# Patient Record
Sex: MALE | Race: WHITE | Employment: FULL TIME | URBAN - NONMETROPOLITAN AREA
[De-identification: names, ages, dates, MRNs, and addresses within clinical notes are randomized per-mention and may not be internally consistent; named-entity substitution may affect disease eponyms.]

---

## 2017-03-08 ENCOUNTER — COMMUNICATION - GICH (OUTPATIENT)
Dept: INTERNAL MEDICINE | Facility: OTHER | Age: 57
End: 2017-03-08

## 2017-03-08 DIAGNOSIS — I10 ESSENTIAL (PRIMARY) HYPERTENSION: ICD-10-CM

## 2017-03-10 ENCOUNTER — HISTORY (OUTPATIENT)
Dept: INTERNAL MEDICINE | Facility: OTHER | Age: 57
End: 2017-03-10

## 2017-03-10 ENCOUNTER — OFFICE VISIT - GICH (OUTPATIENT)
Dept: INTERNAL MEDICINE | Facility: OTHER | Age: 57
End: 2017-03-10

## 2017-03-10 DIAGNOSIS — N52.9 MALE ERECTILE DYSFUNCTION: ICD-10-CM

## 2017-03-10 DIAGNOSIS — I10 ESSENTIAL (PRIMARY) HYPERTENSION: ICD-10-CM

## 2017-03-10 DIAGNOSIS — M06.9 RHEUMATOID ARTHRITIS (H): ICD-10-CM

## 2017-03-10 LAB
A/G RATIO - HISTORICAL: 1.1 (ref 1–2)
ABSOLUTE BASOPHILS - HISTORICAL: 0.1 THOU/CU MM
ABSOLUTE EOSINOPHILS - HISTORICAL: 0.1 THOU/CU MM
ABSOLUTE LYMPHOCYTES - HISTORICAL: 1.5 THOU/CU MM (ref 0.9–2.9)
ABSOLUTE MONOCYTES - HISTORICAL: 0.7 THOU/CU MM
ABSOLUTE NEUTROPHILS - HISTORICAL: 5.1 THOU/CU MM (ref 1.7–7)
ALBUMIN SERPL-MCNC: 4 G/DL (ref 3.5–5.7)
ALP SERPL-CCNC: 70 IU/L (ref 34–104)
ALT (SGPT) - HISTORICAL: 14 IU/L (ref 7–52)
ANION GAP - HISTORICAL: 8 (ref 5–18)
AST SERPL-CCNC: 17 IU/L (ref 13–39)
BASOPHILS # BLD AUTO: 0.7 %
BILIRUB SERPL-MCNC: 0.8 MG/DL (ref 0.3–1)
BUN SERPL-MCNC: 11 MG/DL (ref 7–25)
BUN/CREAT RATIO - HISTORICAL: 10
CALCIUM SERPL-MCNC: 9.4 MG/DL (ref 8.6–10.3)
CHLORIDE SERPLBLD-SCNC: 98 MMOL/L (ref 98–107)
CHOL/HDL RATIO - HISTORICAL: 3.95
CHOLESTEROL TOTAL: 158 MG/DL
CO2 SERPL-SCNC: 23 MMOL/L (ref 21–31)
CREAT SERPL-MCNC: 1.15 MG/DL (ref 0.7–1.3)
EOSINOPHIL NFR BLD AUTO: 1.7 %
ERYTHROCYTE [DISTWIDTH] IN BLOOD BY AUTOMATED COUNT: 11.6 % (ref 11.5–15.5)
GFR IF NOT AFRICAN AMERICAN - HISTORICAL: >60 ML/MIN/1.73M2
GLOBULIN - HISTORICAL: 3.6 G/DL (ref 2–3.7)
GLUCOSE SERPL-MCNC: 150 MG/DL (ref 70–105)
HCT VFR BLD AUTO: 43.7 % (ref 37–53)
HDLC SERPL-MCNC: 40 MG/DL (ref 23–92)
HEMOGLOBIN: 15.2 G/DL (ref 13.5–17.5)
LDLC SERPL CALC-MCNC: 87 MG/DL
LYMPHOCYTES NFR BLD AUTO: 20.5 % (ref 20–44)
MCH RBC QN AUTO: 32.6 PG (ref 26–34)
MCHC RBC AUTO-ENTMCNC: 34.8 G/DL (ref 32–36)
MCV RBC AUTO: 94 FL (ref 80–100)
MONOCYTES NFR BLD AUTO: 8.8 %
NEUTROPHILS NFR BLD AUTO: 68.4 % (ref 42–72)
NON-HDL CHOLESTEROL - HISTORICAL: 118 MG/DL
PATIENT STATUS - HISTORICAL: ABNORMAL
PLATELET # BLD AUTO: 284 THOU/CU MM (ref 140–440)
PMV BLD: 6.8 FL (ref 6.5–11)
POTASSIUM SERPL-SCNC: 3.7 MMOL/L (ref 3.5–5.1)
PROT SERPL-MCNC: 7.6 G/DL (ref 6.4–8.9)
PSA TOTAL (DIAGNOSTIC) - HISTORICAL: 3.9 NG/ML
RED BLOOD COUNT - HISTORICAL: 4.67 MIL/CU MM (ref 4.3–5.9)
SODIUM SERPL-SCNC: 129 MMOL/L (ref 133–143)
TRIGL SERPL-MCNC: 155 MG/DL
WHITE BLOOD COUNT - HISTORICAL: 7.5 THOU/CU MM (ref 4.5–11)

## 2017-05-24 ENCOUNTER — AMBULATORY - GICH (OUTPATIENT)
Dept: SCHEDULING | Facility: OTHER | Age: 57
End: 2017-05-24

## 2017-06-09 ENCOUNTER — COMMUNICATION - GICH (OUTPATIENT)
Dept: INTERNAL MEDICINE | Facility: OTHER | Age: 57
End: 2017-06-09

## 2017-06-09 DIAGNOSIS — M06.9 RHEUMATOID ARTHRITIS (H): ICD-10-CM

## 2017-09-14 ENCOUNTER — COMMUNICATION - GICH (OUTPATIENT)
Dept: INTERNAL MEDICINE | Facility: OTHER | Age: 57
End: 2017-09-14

## 2017-09-14 DIAGNOSIS — M06.9 RHEUMATOID ARTHRITIS (H): ICD-10-CM

## 2017-09-14 DIAGNOSIS — K21.9 GASTRO-ESOPHAGEAL REFLUX DISEASE WITHOUT ESOPHAGITIS: ICD-10-CM

## 2017-12-13 ENCOUNTER — COMMUNICATION - GICH (OUTPATIENT)
Dept: INTERNAL MEDICINE | Facility: OTHER | Age: 57
End: 2017-12-13

## 2017-12-13 DIAGNOSIS — M06.9 RHEUMATOID ARTHRITIS (H): ICD-10-CM

## 2017-12-28 NOTE — TELEPHONE ENCOUNTER
Patient Information     Patient Name MRN Wicho Briones [id number] Male 1960      Telephone Encounter by Cheyenne Verdin RN at 2017  8:18 AM     Author:  Cheyenne Verdin RN Service:  (none) Author Type:  NURS- Registered Nurse     Filed:  2017  8:19 AM Encounter Date:  2017 Status:  Signed     :  Cheyenne Verdin RN (NURS- Registered Nurse)            Refilled 17.  Unable to complete prescription refill per RN Medication Refill Policy.................... CHEYENNE VERDIN RN ....................  2017   8:19 AM

## 2017-12-28 NOTE — TELEPHONE ENCOUNTER
Patient Information     Patient Name MRN Sex Wicho Gomez [id number] Male 1960      Telephone Encounter by Tamia Luong RN at 2017  1:25 PM     Author:  Tamia Luong RN Service:  (none) Author Type:  NURS- Registered Nurse     Filed:  2017  1:33 PM Encounter Date:  2017 Status:  Signed     :  Tamia Luong RN (NURS- Registered Nurse)            [facility] and pt request refill Rxs for methotrexate (Rheumatrex) and omeprazole (Prilsosec).      Unable to complete prescription refill for methotrexate per RN Medication Refill Policy. This RN will forward Rx refill request to PCP.  Tamia Luong RN  2017   1:30 PM.    Proton Pump Inhibitors    Office visit in the past 12 months or per provider note.    Last visit with LINDA IGLESIAS was on: 03/10/2017 in [facility]  Next visit with LINDA IGLESIAS is on: No future appointment listed with this provider  Next visit with Internal Medicine is on: No future appointment listed in this department    Max refill for 12 months from last office visit or per provider note.    Prescription refilled per RN Medication Refill Policy.................... Tamia Luong RN ....................  2017   1:31 PM

## 2018-01-03 NOTE — NURSING NOTE
Patient Information     Patient Name MRN Wicho Briones [id number] Male 1960      Nursing Note by Linda Norwood LPN at 3/10/2017 10:40 AM     Author:  Linda Norwood LPN Service:  (none) Author Type:  NURS- Licensed Practical Nurse     Filed:  3/10/2017 10:34 AM Encounter Date:  3/10/2017 Status:  Signed     :  Linda Norwood LPN (NURS- Licensed Practical Nurse)            The patient is here today to get his medications refilled.  Linda Norwood LPN......3/10/2017  10:30 AM

## 2018-01-03 NOTE — TELEPHONE ENCOUNTER
Patient Information     Patient Name MRN Sex Wicho Gomez [id number] Male 1960      Telephone Encounter by Tamia Bates RN at 3/9/2017  8:15 AM     Author:  Tamia Bates RN Service:  (none) Author Type:  NURS- Registered Nurse     Filed:  3/9/2017  8:23 AM Encounter Date:  3/8/2017 Status:  Signed     :  Tamia Bates RN (NURS- Registered Nurse)            Diuretic Combinations    Office visit in the past 12 months or per provider note.    Last visit with LINDA IGLESIAS was on: 2016 in [facility]  Next visit with LINDA IGLESIAS is on: 03/10/2017 in [facility]  Next visit with Internal Medicine is on: 03/10/2017 in [facility]    Lab test requirements:  Creatinine and Potassium annually, if ordering lab, order BMP.  CREATININE (mg/dL)    Date Value   2016 1.19     POTASSIUM (mmol/L)    Date Value   2016 3.9       Max refill for 12 months from last office visit or per provider note    Medication management appointment tomorrow 3/10/17 noted  Prescription refilled per RN Medication Refill Policy.................... Tamia Bates RN ....................  3/9/2017   8:15 AM

## 2018-01-03 NOTE — PROGRESS NOTES
Patient Information     Patient Name MRN Wicho Briones [id number] Male 1960      Progress Notes by Leonardo Gomes MD at 3/10/2017 10:40 AM     Author:  Leonardo Gomes MD Service:  (none) Author Type:  Physician     Filed:  3/10/2017 10:53 AM Encounter Date:  3/10/2017 Status:  Signed     :  Leonardo Gomes MD (Physician)            SUBJECTIVE:    Wicho Hawk is a 56 y.o. male who presents for comprehensive review of their multiple medical problems and review of medications, renewal of medications and update on necessary health maintenance issues.      HPI Comments: He comes in today for a checkup. He has rheumatoid arthritis. His methotrexate is at 7.5 mg weekly. He's having more problems of late with his arthritis including and especially in his right hand and fingers. We talked about various options for dealing with this. He takes some Aleve on occasion which helps as well.    The last time he had been in he was having significant troubles with dysphagia. He had an upper GI endoscopy that was negative and never did follow through with the presumed manometry that Dr. Garces was contemplating. I don't have any of those records. He is not having too much trouble with his swallowing at this time.    I spent time today reconciling his medications and reviewing his past medical history, past surgical history, family history and social history.      No Known Allergies,   Current Outpatient Prescriptions     Medication  Sig     folic acid 1 mg tablet TAKE ONE TABLET BY MOUTH EVERY DAY     lisinopril-hydrochlorothiazide, 20-25 mg, (PRINZIDE, ZESTORETIC) 20-25 mg per tablet TAKE ONE TABLET BY MOUTH EVERY DAY     methotrexate (RHEUMATREX) 2.5 mg tablet Take 3 tablets by mouth once weekly.     omeprazole (PRILOSEC) 20 mg Delayed-Release capsule Take 1 capsule by mouth once daily before a meal.     sildenafil citrate (VIAGRA) 100 mg tablet Take 1 tablet by mouth once daily if needed for Erectile  Dysfunction. Take 30min to 4 hours before sexual activity. Max 100mg/24hr.     No current facility-administered medications for this visit.      Medications have been reviewed by me and are current to the best of my knowledge and ability. ,   Past Medical History      Diagnosis   Date     ED (erectile dysfunction)  2014     GERD (gastroesophageal reflux disease)       H/O cardiovascular stress test       Cardiolite stress test with questionable septal anterior perfusion defect       Hyperlipidemia       Hypertension       RA (rheumatoid arthritis) (HC)     ,   Patient Active Problem List      Diagnosis Date Noted     ED (erectile dysfunction) 2014     Hyperlipidemia      Hypertension 2013     ARTHRITIS, RHEUMATOID 2011     GERD    ,   Past Surgical History       Procedure   Laterality Date     Vasectomy        Forearm/wrist surgery  Left      Wrist surgery with fusion.         Colonoscopy screening        Normal screening colonoscopy, follow up recommended in 10 years        Shoulder arthroscopy  Left 2011      Upper arm/elbow surgery        Right elbow surgery for bone spurs/chips       Knee arthroscopy  Left      Esophagogastroduodenoscopy   2015     [place]      and   Social History      Substance Use Topics        Smoking status:  Never Smoker     Smokeless tobacco:  Never Used     Alcohol use  3.0 oz/week     6 Standard drinks or equivalent per week      Family Status     Relation  Status     Father      Mother Alive     Brother Alive     Brother Alive     Sister Alive     Daughter     Leukemia      Daughter      Social History     Social History        Marital status:       Spouse name: N/A     Number of children:  N/A     Years of education:  N/A     Social History Main Topics        Smoking status:  Never Smoker     Smokeless tobacco:  Never Used     Alcohol use  3.0 oz/week     6 Standard drinks or equivalent per week      Drug use:  No     Sexual  activity:  Not Asked     Other Topics  Concern     None      Social History Narrative     He worked as a  for [employer].  , lives in [place].        Works at [employer].                                        REVIEW OF SYSTEMS:  Review of Systems   Constitutional: Negative for chills, diaphoresis, fever, malaise/fatigue and weight loss.   HENT: Negative for congestion, ear pain, nosebleeds, sore throat and tinnitus.    Eyes: Negative for blurred vision, double vision, photophobia, pain, discharge and redness.   Respiratory: Negative for cough, hemoptysis, sputum production, shortness of breath and wheezing.    Cardiovascular: Negative for chest pain, palpitations, orthopnea, claudication, leg swelling and PND.   Gastrointestinal: Negative for abdominal pain, blood in stool, constipation, diarrhea, heartburn, nausea and vomiting.   Genitourinary: Negative for dysuria, flank pain and hematuria.   Musculoskeletal: Negative for back pain, joint pain, myalgias and neck pain.   Skin: Negative for itching and rash.   Neurological: Negative for dizziness, tingling, tremors, speech change, loss of consciousness, weakness and headaches.   Psychiatric/Behavioral: Negative for depression, hallucinations, memory loss, substance abuse and suicidal ideas. The patient is not nervous/anxious.        OBJECTIVE:  /78  Pulse 96  Wt 96.1 kg (211 lb 12.8 oz)  BMI 29.13 kg/m2    EXAM:   Physical Exam   Constitutional: He is well-developed, well-nourished, and in no distress. No distress.   HENT:   Head: Normocephalic.   Neck: Normal range of motion. Neck supple.   Cardiovascular: Normal rate, regular rhythm and normal heart sounds.  Exam reveals no gallop and no friction rub.    No murmur heard.  Pulmonary/Chest: Effort normal and breath sounds normal. No respiratory distress. He has no wheezes. He has no rales.   Abdominal: Soft. Bowel sounds are normal. He exhibits no distension and no mass. There is  no tenderness. There is no rebound and no guarding.   Musculoskeletal:   Moderate synovitis right wrist, minimal synovitis left wrist. He has some significant synovitis in his second MCP joint.   Neurological: He is alert.   Skin: Skin is warm and dry. He is not diaphoretic.   Nursing note and vitals reviewed.      ASSESSMENT/PLAN:    ICD-10-CM    1. Rheumatoid arthritis, involving unspecified site, unspecified rheumatoid factor presence M06.9 methotrexate (RHEUMATREX) 2.5 mg tablet      folic acid 1 mg tablet      COMP METABOLIC PANEL      CBC AND DIFFERENTIAL      COMP METABOLIC PANEL      CBC AND DIFFERENTIAL      CBC WITH AUTO DIFFERENTIAL      DISCONTINUED: methotrexate (RHEUMATREX) 2.5 mg tablet   2. Hypertension I10 lisinopril-hydrochlorothiazide, 20-25 mg, (PRINZIDE, ZESTORETIC) 20-25 mg per tablet      LIPID PANEL      LIPID PANEL   3. Erectile dysfunction, unspecified erectile dysfunction type N52.9 PSA TOTAL (DIAGNOSTIC)      PSA TOTAL (DIAGNOSTIC)        Plan:  He will try increasing the methotrexate to 10 mg weekly. If not improved he may want to go to 12.5 mg weekly. Other medications will continue without change. If he has continued right hand and arm symptoms she will call and I will get him scheduled for an EMG. I did talk to him about other disease modifying agents for his rheumatoid arthritis, he is not necessarily interested in proceeding with that at the present time. Complete lab pending, I will send him a letter with the results.

## 2018-01-26 VITALS
DIASTOLIC BLOOD PRESSURE: 78 MMHG | SYSTOLIC BLOOD PRESSURE: 102 MMHG | WEIGHT: 211.8 LBS | BODY MASS INDEX: 29.13 KG/M2 | HEART RATE: 96 BPM

## 2018-01-30 ENCOUNTER — DOCUMENTATION ONLY (OUTPATIENT)
Dept: FAMILY MEDICINE | Facility: OTHER | Age: 58
End: 2018-01-30

## 2018-01-30 PROBLEM — K21.9 ESOPHAGEAL REFLUX: Status: ACTIVE | Noted: 2018-01-30

## 2018-01-30 PROBLEM — E78.5 HYPERLIPIDEMIA: Status: ACTIVE | Noted: 2018-01-30

## 2018-01-30 RX ORDER — LISINOPRIL AND HYDROCHLOROTHIAZIDE 20; 25 MG/1; MG/1
1 TABLET ORAL DAILY
COMMUNITY
Start: 2017-03-10 | End: 2018-03-28

## 2018-01-30 RX ORDER — SILDENAFIL 100 MG/1
1 TABLET, FILM COATED ORAL DAILY PRN
COMMUNITY
Start: 2014-07-02 | End: 2018-03-28

## 2018-01-30 RX ORDER — FOLIC ACID 1 MG/1
1 TABLET ORAL DAILY
COMMUNITY
Start: 2017-03-10 | End: 2018-04-30

## 2018-03-14 DIAGNOSIS — M06.9 ARTHRITIS, RHEUMATOID (H): ICD-10-CM

## 2018-03-14 DIAGNOSIS — M06.9 RHEUMATOID ARTHRITIS, INVOLVING UNSPECIFIED SITE, UNSPECIFIED RHEUMATOID FACTOR PRESENCE: Primary | ICD-10-CM

## 2018-03-14 NOTE — LETTER
March 19, 2018      [address]  [address]   [place] [address]        Dear Nils Carine,     Your pharmacy has requested a refill of Methotrexate.  This medication request has been forwarded to Dr. Gomes.     According to our records, you are overdue for annual medication management and labs. Dr. Gomes had also requested a 3 month follow up after your last lab results in March of 2017. Your health is very important to us. Please contact our scheduling line at [phone] to set up this appointment at your earliest convenience.     Thank you for choosing [facility] for your health care needs.     Sincerely,        The Refill Nurse  [facility]

## 2018-03-19 NOTE — TELEPHONE ENCOUNTER
"Routing refill request to provider for review/approval because:  Drug not on the [facility] refill protocol     Patient needs to be seen because:  Last labs in March, 2017 showed abnormal PSA and glucose elevated. Letter to patient states, \"You need to have a follow-up office visit in 3 months. At that visit we will recheck your blood work and we will also check your prostate.\"  Patient did not follow-up as requested. Attempted to call patient with no answer, message left on personal voicemail. Reminder Letter also sent.  Tamia Heller RN on 3/19/2018 at 9:58 AM              "

## 2018-03-28 ENCOUNTER — OFFICE VISIT (OUTPATIENT)
Dept: INTERNAL MEDICINE | Facility: OTHER | Age: 58
End: 2018-03-28
Attending: INTERNAL MEDICINE
Payer: COMMERCIAL

## 2018-03-28 VITALS
SYSTOLIC BLOOD PRESSURE: 118 MMHG | BODY MASS INDEX: 29.43 KG/M2 | DIASTOLIC BLOOD PRESSURE: 78 MMHG | HEART RATE: 88 BPM | WEIGHT: 214 LBS

## 2018-03-28 DIAGNOSIS — I10 ESSENTIAL HYPERTENSION: ICD-10-CM

## 2018-03-28 DIAGNOSIS — E78.5 HYPERLIPIDEMIA, UNSPECIFIED HYPERLIPIDEMIA TYPE: ICD-10-CM

## 2018-03-28 DIAGNOSIS — K21.9 GASTROESOPHAGEAL REFLUX DISEASE WITHOUT ESOPHAGITIS: ICD-10-CM

## 2018-03-28 DIAGNOSIS — M06.9 RHEUMATOID ARTHRITIS, INVOLVING UNSPECIFIED SITE, UNSPECIFIED RHEUMATOID FACTOR PRESENCE: Primary | ICD-10-CM

## 2018-03-28 DIAGNOSIS — N52.9 ERECTILE DYSFUNCTION, UNSPECIFIED ERECTILE DYSFUNCTION TYPE: ICD-10-CM

## 2018-03-28 LAB
ALBUMIN SERPL-MCNC: 4.3 G/DL (ref 3.5–5.7)
ALP SERPL-CCNC: 73 U/L (ref 34–104)
ALT SERPL W P-5'-P-CCNC: 18 U/L (ref 7–52)
ANION GAP SERPL CALCULATED.3IONS-SCNC: 6 MMOL/L (ref 3–14)
AST SERPL W P-5'-P-CCNC: 18 U/L (ref 13–39)
BILIRUB SERPL-MCNC: 0.9 MG/DL (ref 0.3–1)
BUN SERPL-MCNC: 11 MG/DL (ref 7–25)
CALCIUM SERPL-MCNC: 9.4 MG/DL (ref 8.6–10.3)
CHLORIDE SERPL-SCNC: 94 MMOL/L (ref 98–107)
CHOLEST SERPL-MCNC: 185 MG/DL
CO2 SERPL-SCNC: 29 MMOL/L (ref 21–31)
CREAT SERPL-MCNC: 1.23 MG/DL (ref 0.7–1.3)
ERYTHROCYTE [DISTWIDTH] IN BLOOD BY AUTOMATED COUNT: 13.2 % (ref 10–15)
GFR SERPL CREATININE-BSD FRML MDRD: 60 ML/MIN/1.7M2
GLUCOSE SERPL-MCNC: 106 MG/DL (ref 70–105)
HCT VFR BLD AUTO: 43.7 % (ref 40–53)
HDLC SERPL-MCNC: 45 MG/DL (ref 23–92)
HGB BLD-MCNC: 15.9 G/DL (ref 13.3–17.7)
LDLC SERPL CALC-MCNC: 112 MG/DL
MCH RBC QN AUTO: 33 PG (ref 26.5–33)
MCHC RBC AUTO-ENTMCNC: 36.4 G/DL (ref 31.5–36.5)
MCV RBC AUTO: 91 FL (ref 78–100)
NONHDLC SERPL-MCNC: 140 MG/DL
PLATELET # BLD AUTO: 234 10E9/L (ref 150–450)
POTASSIUM SERPL-SCNC: 4.3 MMOL/L (ref 3.5–5.1)
PROT SERPL-MCNC: 7.3 G/DL (ref 6.4–8.9)
RBC # BLD AUTO: 4.82 10E12/L (ref 4.4–5.9)
SODIUM SERPL-SCNC: 129 MMOL/L (ref 134–144)
TRIGL SERPL-MCNC: 142 MG/DL
WBC # BLD AUTO: 6.1 10E9/L (ref 4–11)

## 2018-03-28 PROCEDURE — 80061 LIPID PANEL: CPT | Performed by: INTERNAL MEDICINE

## 2018-03-28 PROCEDURE — 36415 COLL VENOUS BLD VENIPUNCTURE: CPT | Performed by: INTERNAL MEDICINE

## 2018-03-28 PROCEDURE — 80053 COMPREHEN METABOLIC PANEL: CPT | Performed by: INTERNAL MEDICINE

## 2018-03-28 PROCEDURE — 85027 COMPLETE CBC AUTOMATED: CPT | Performed by: INTERNAL MEDICINE

## 2018-03-28 PROCEDURE — 99215 OFFICE O/P EST HI 40 MIN: CPT | Performed by: INTERNAL MEDICINE

## 2018-03-28 RX ORDER — LISINOPRIL AND HYDROCHLOROTHIAZIDE 20; 25 MG/1; MG/1
1 TABLET ORAL DAILY
Qty: 90 TABLET | Refills: 3 | Status: SHIPPED | OUTPATIENT
Start: 2018-03-28 | End: 2019-04-30

## 2018-03-28 ASSESSMENT — ENCOUNTER SYMPTOMS
BRUISES/BLEEDS EASILY: 0
CONFUSION: 0
DYSURIA: 0
DIZZINESS: 0
AGITATION: 0
WHEEZING: 0
EYE PAIN: 0
SEIZURES: 0
NECK STIFFNESS: 0
SLEEP DISTURBANCE: 0
ABDOMINAL DISTENTION: 0
SORE THROAT: 0
EYE REDNESS: 0
NERVOUS/ANXIOUS: 0
CHILLS: 0
DIARRHEA: 0
VOICE CHANGE: 0
SINUS PAIN: 0
DIFFICULTY URINATING: 0
ABDOMINAL PAIN: 0
ARTHRALGIAS: 1
RHINORRHEA: 0
FEVER: 0
CONSTIPATION: 0
CHEST TIGHTNESS: 0
HEMATURIA: 0
FATIGUE: 0
WOUND: 0
WEAKNESS: 0
DIAPHORESIS: 0
FREQUENCY: 0
NUMBNESS: 0
TREMORS: 0
VOMITING: 0
JOINT SWELLING: 0
COUGH: 0
SPEECH DIFFICULTY: 0
PALPITATIONS: 0
SINUS PRESSURE: 0
ADENOPATHY: 0
NAUSEA: 0
HALLUCINATIONS: 0
APPETITE CHANGE: 0
FLANK PAIN: 0
BACK PAIN: 0
SHORTNESS OF BREATH: 0
NECK PAIN: 0
TROUBLE SWALLOWING: 0
BLOOD IN STOOL: 0
UNEXPECTED WEIGHT CHANGE: 0
HEADACHES: 0
LIGHT-HEADEDNESS: 0

## 2018-03-28 NOTE — LETTER
March 28, 2018      [address]  [address]   [place] [address]        Dear Wicho Hawk,    Below are the results of your recent labs:    Results for orders placed or performed in visit on 03/28/18   Comprehensive metabolic panel (BMP + Alb, Alk Phos, ALT, AST, Total. Bili, TP)   Result Value Ref Range    Sodium 129 (L) 134 - 144 mmol/L    Potassium 4.3 3.5 - 5.1 mmol/L    Chloride 94 (L) 98 - 107 mmol/L    Carbon Dioxide 29 21 - 31 mmol/L    Anion Gap 6 3 - 14 mmol/L    Glucose 106 (H) 70 - 105 mg/dL    Urea Nitrogen 11 7 - 25 mg/dL    Creatinine 1.23 0.70 - 1.30 mg/dL    GFR Estimate 60 (L) >60 mL/min/1.7m2    GFR Estimate If Black 73 >60 mL/min/1.7m2    Calcium 9.4 8.6 - 10.3 mg/dL    Bilirubin Total 0.9 0.3 - 1.0 mg/dL    Albumin 4.3 3.5 - 5.7 g/dL    Protein Total 7.3 6.4 - 8.9 g/dL    Alkaline Phosphatase 73 34 - 104 U/L    ALT 18 7 - 52 U/L    AST 18 13 - 39 U/L   Lipid Profile (Chol, Trig, HDL, LDL calc) - FASTING   Result Value Ref Range    Cholesterol 185 <200 mg/dL    Triglycerides 142 <150 mg/dL    HDL Cholesterol 45 23 - 92 mg/dL    LDL Cholesterol Calculated 112 (H) <100 mg/dL    Non HDL Cholesterol 140 (H) <130 mg/dL   CBC with platelets   Result Value Ref Range    WBC 6.1 4.0 - 11.0 10e9/L    RBC Count 4.82 4.4 - 5.9 10e12/L    Hemoglobin 15.9 13.3 - 17.7 g/dL    Hematocrit 43.7 40.0 - 53.0 %    MCV 91 78 - 100 fl    MCH 33.0 26.5 - 33.0 pg    MCHC 36.4 31.5 - 36.5 g/dL    RDW 13.2 10.0 - 15.0 %    Platelet Count 234 150 - 450 10e9/L        Overall, your blood tests look fine.  Your sodium level is low as it always is, this is likely from your blood pressure pill.  Everything else looks acceptable.  If you have any questions about your results, feel free to contact me.    Sincerely,        Leonardo Gomes MD  Internal Medicine  [facility]

## 2018-03-28 NOTE — MR AVS SNAPSHOT
"              After Visit Summary   3/28/2018    Wicho Hawk    MRN: [id number]           Patient Information     Date Of Birth          1960        Visit Information        Provider Department      3/28/2018 9:40 AM Leonardo Gomes MD [facility]        Today's Diagnoses     Rheumatoid arthritis, involving unspecified site, unspecified rheumatoid factor presence (H)    -  1    Essential hypertension        Erectile dysfunction, unspecified erectile dysfunction type        Hyperlipidemia, unspecified hyperlipidemia type        Gastroesophageal reflux disease without esophagitis           Follow-ups after your visit        Additional Services     RHEUMATOLOGY REFERRAL       [place]                  Who to contact     If you have questions or need follow up information about today's clinic visit or your schedule please contact [facility] directly at [phone].  Normal or non-critical lab and imaging results will be communicated to you by [employer], letter or phone within 4 business days after the clinic has received the results. If you do not hear from us within 7 days, please contact the clinic through [employer] or phone. If you have a critical or abnormal lab result, we will notify you by phone as soon as possible.  Submit refill requests through [employer] or call your pharmacy and they will forward the refill request to us. Please allow 3 business days for your refill to be completed.          Additional Information About Your Visit        [employer] Information     [employer] lets you send messages to your doctor, view your test results, renew your prescriptions, schedule appointments and more. To sign up, go to [email or web address] . Click on \"Log in\" on the left side of the screen, which will take you to the Welcome page. Then click on \"Sign up Now\" on the right side of the page.     You will be asked to enter the access code listed below, as well as some personal " information. Please follow the directions to create your username and password.     Your access code is: [email or web address]  Expires: 2018 10:12 AM     Your access code will  in 90 days. If you need help or a new code, please call your [facility] clinic or [phone].        Care EveryWhere ID     This is your Care EveryWhere ID. This could be used by other organizations to access your [facility] medical records  [id number]        Your Vitals Were     Pulse BMI (Body Mass Index)                88 29.43 kg/m2           Blood Pressure from Last 3 Encounters:   18 118/78   03/10/17 102/78   16 108/74    Weight from Last 3 Encounters:   18 214 lb (97.1 kg)   03/10/17 211 lb 12.8 oz (96.1 kg)   16 208 lb 6.4 oz (94.5 kg)              We Performed the Following     CBC with platelets     Comprehensive metabolic panel (BMP + Alb, Alk Phos, ALT, AST, Total. Bili, TP)     Lipid Profile (Chol, Trig, HDL, LDL calc) - FASTING     RHEUMATOLOGY REFERRAL          Today's Medication Changes          These changes are accurate as of 3/28/18 10:12 AM.  If you have any questions, ask your nurse or doctor.               These medicines have changed or have updated prescriptions.        Dose/Directions    nabumetone 750 MG tablet   Commonly known as:  RELAFEN   This may have changed:  Another medication with the same name was removed. Continue taking this medication, and follow the directions you see here.   Changed by:  Leonardo Gomes MD        Dose:  750 mg   Take 1 tablet (750 mg) by mouth 2 times daily as needed for moderate pain   Quantity:  90 tablet   Refills:  1         Stop taking these medicines if you haven't already. Please contact your care team if you have questions.     sildenafil 100 MG tablet   Commonly known as:  VIAGRA   Stopped by:  Leonardo Gomes MD                Where to get your medicines      These medications were sent to [facility] - [place], [place] - [address]  [address], [facility] [address]     Phone:  [phone]     lisinopril-hydrochlorothiazide 20-25 MG per tablet    omeprazole 20 MG CR capsule                Primary Care Provider Office Phone # Fax #    Leonardo Gomes [phone]       [address]  [facility] [address]        Equal Access to Services     [facility]OSSEIN : Hadii aad ku hadasho Soomaali, waaxda luqadaha, qaybta kaalmada adeegyada, waxay idiin hayaan adeeg kheliseo laabhishek . So [facility] [phone].    ATENCIÓN: Si ady ro, tiene a burt disposición servicios gratuitos de asistencia lingüística. Llame al [phone].    We comply with applicable federal civil rights laws and [place] laws. We do not discriminate on the basis of race, color, national origin, age, disability, sex, sexual orientation, or gender identity.            Thank you!     Thank you for choosing [facility]  for your care. Our goal is always to provide you with excellent care. Hearing back from our patients is one way we can continue to improve our services. Please take a few minutes to complete the written survey that you may receive in the mail after your visit with us. Thank you!             Your Updated Medication List - Protect others around you: Learn how to safely use, store and throw away your medicines at www.disposemymeds.org.          This list is accurate as of 3/28/18 10:12 AM.  Always use your most recent med list.                   Brand Name Dispense Instructions for use Diagnosis    folic acid 1 MG tablet    FOLVITE     Take 1 mg by mouth daily        lisinopril-hydrochlorothiazide 20-25 MG per tablet    PRINZIDE/ZESTORETIC    90 tablet    Take 1 tablet by mouth daily    Essential hypertension       methotrexate 2.5 MG tablet CHEMO     48 tablet    Take 4 tablets (10 mg) by mouth once a week    Rheumatoid arthritis, involving unspecified site, unspecified rheumatoid factor presence (H)        nabumetone 750 MG tablet    RELAFEN    90 tablet    Take 1 tablet (750 mg) by mouth 2 times daily as needed for moderate pain        omeprazole 20 MG CR capsule    priLOSEC    90 capsule    Take 1 capsule (20 mg) by mouth daily with food    Gastroesophageal reflux disease without esophagitis

## 2018-03-28 NOTE — PROGRESS NOTES
Chief Complaint:  This patient is here for a comprehensive review of their multiplemedical problems and review of medications, renewal of medications and update on necessary health maintenance issues.      HPI: This patient is here today for complete evaluation and a review of his chronic medical problems.  His main problem is his rheumatoid arthritis.  He is on methotrexate.  He does not get labs checked like he should.  This was discussed with him today.  I also again recommended rheumatology consultation for consideration of additional therapy for control of his rheumatoid arthritis.  He is currently seeing orthopedics and has been placed on Relafen twice daily to try to help with some of his arthritic complaints.  He does not have any difficulties taking the methotrexate and he does take folic acid with this.    He has a history of hypertension.  He is on single drug therapy for this and has good control of his blood pressure.  He has a history of reflux disease and is taking omeprazole on a daily basis without any difficulties and is asymptomatic when taking this.    He has a history of erectile dysfunction.  The Viagra was too expensive so he no longer uses this.  I told him the next year he would be due for a colonoscopy as well as immunizations including Prevnar, etc.    Medications are reconciled.  Past medical history, past surgical history, family history and social histories are all reviewed and updated today.    Past Medical History:   Diagnosis Date     Equivocal stress test     Cardiolyte     Essential (primary) hypertension     No Comments Provided     Gastro-esophageal reflux disease without esophagitis     No Comments Provided     Hyperlipidemia     No Comments Provided     Male erectile dysfunction     7/2/2014     Rheumatoid arthritis (H)     No Comments Provided       Past Surgical History:   Procedure Laterality Date     ARTHROSCOPY KNEE Left     No Comments Provided     ARTHROSCOPY SHOULDER  Left     2011     ARTHROTOMY WRIST Left     Wrist surgery with fusion.     COLONOSCOPY      ,Normal screening colonoscopy, follow up recommended in 10 years     ELBOW SURGERY Right     Right elbow surgery for bone spurs/chips     ESOPHAGOSCOPY, GASTROSCOPY, DUODENOSCOPY (EGD), COMBINED      2015,[place]     VASECTOMY      No Comments Provided       Current Outpatient Prescriptions   Medication Sig Dispense Refill     nabumetone (RELAFEN) 750 MG tablet Take 1 tablet (750 mg) by mouth 2 times daily as needed for moderate pain 90 tablet 1     lisinopril-hydrochlorothiazide (PRINZIDE/ZESTORETIC) 20-25 MG per tablet Take 1 tablet by mouth daily 90 tablet 3     omeprazole (PRILOSEC) 20 MG CR capsule Take 1 capsule (20 mg) by mouth daily with food 90 capsule 3     methotrexate 2.5 MG tablet CHEMO Take 4 tablets (10 mg) by mouth once a week 48 tablet 1     folic acid (FOLVITE) 1 MG tablet Take 1 mg by mouth daily       [DISCONTINUED] nabumetone (RELAFEN) 750 MG tablet TAKE ONE TABLET BY MOUTH TWICE DAILY (START AFTER FINISHING 500MG)  2     [DISCONTINUED] lisinopril-hydrochlorothiazide (PRINZIDE/ZESTORETIC) 20-25 MG per tablet Take 1 tablet by mouth daily         No Known Allergies    Family History   Problem Relation Age of Onset     Other - See Comments Father       of some type of aneurysm.     Hypertension Mother      CANCER Daughter      Cancer,Leukemia       Social History     Social History     Marital status:      Spouse name: N/A     Number of children: N/A     Years of education: N/A     Occupational History     Not on file.     Social History Main Topics     Smoking status: Never Smoker     Smokeless tobacco: Never Used     Alcohol use 3.0 oz/week      Comment: Occasional     Drug use: No      Comment: Drug use: No     Sexual activity: Not on file     Other Topics Concern     Not on file     Social History Narrative    He worked as a  for [employer].  , lives in [place].   Works at [employer].       Review of Systems   Constitutional: Negative for appetite change, chills, diaphoresis, fatigue, fever and unexpected weight change.   HENT: Negative for ear pain, rhinorrhea, sinus pain, sinus pressure, sore throat, trouble swallowing and voice change.    Eyes: Negative for pain, redness and visual disturbance.   Respiratory: Negative for cough, chest tightness, shortness of breath and wheezing.    Cardiovascular: Negative for chest pain, palpitations and leg swelling.   Gastrointestinal: Negative for abdominal distention, abdominal pain, blood in stool, constipation, diarrhea, nausea and vomiting.   Endocrine: Negative for cold intolerance and heat intolerance.   Genitourinary: Negative for difficulty urinating, dysuria, flank pain, frequency and hematuria.   Musculoskeletal: Positive for arthralgias. Negative for back pain, joint swelling, neck pain and neck stiffness.   Skin: Negative for rash and wound.   Allergic/Immunologic: Negative for immunocompromised state.   Neurological: Negative for dizziness, tremors, seizures, syncope, speech difficulty, weakness, light-headedness, numbness and headaches.   Hematological: Negative for adenopathy. Does not bruise/bleed easily.   Psychiatric/Behavioral: Negative for agitation, behavioral problems, confusion, hallucinations and sleep disturbance. The patient is not nervous/anxious.        Physical Exam   Constitutional: He is oriented to person, place, and time. He appears well-developed and well-nourished. No distress.   HENT:   Head: Normocephalic.   Right Ear: External ear normal.   Left Ear: External ear normal.   Nose: Nose normal.   Mouth/Throat: Oropharynx is clear and moist.   Eyes: Conjunctivae are normal. Pupils are equal, round, and reactive to light.   Neck: Normal range of motion. Neck supple. Normal carotid pulses and no JVD present. Carotid bruit is not present. No tracheal deviation present. No thyromegaly present.    Cardiovascular: Normal rate and regular rhythm.  Exam reveals no gallop and no friction rub.    No murmur heard.  Pulmonary/Chest: Effort normal and breath sounds normal. No respiratory distress. He has no wheezes. He has no rales.   Abdominal: Soft. Bowel sounds are normal. He exhibits no distension and no mass. There is no tenderness. There is no rebound and no guarding. No hernia.   Genitourinary: Rectum normal, prostate normal and penis normal.   Musculoskeletal: Normal range of motion. He exhibits no edema.   He has synovitis most prominent in his second MCP joints bilaterally but it is also present in his wrists to moderate degree.   Lymphadenopathy:     He has no cervical adenopathy.   Neurological: He is alert and oriented to person, place, and time. He has normal reflexes. No cranial nerve deficit. He exhibits normal muscle tone. Coordination normal.   Skin: Skin is warm and dry. No rash noted. He is not diaphoretic.   Psychiatric: He has a normal mood and affect. His behavior is normal.   Nursing note and vitals reviewed.      Assessment:      ICD-10-CM    1. Rheumatoid arthritis, involving unspecified site, unspecified rheumatoid factor presence (H) M06.9 RHEUMATOLOGY REFERRAL     Comprehensive metabolic panel (BMP + Alb, Alk Phos, ALT, AST, Total. Bili, TP)     CBC with platelets   2. Essential hypertension I10 lisinopril-hydrochlorothiazide (PRINZIDE/ZESTORETIC) 20-25 MG per tablet   3. Erectile dysfunction, unspecified erectile dysfunction type N52.9    4. Hyperlipidemia, unspecified hyperlipidemia type E78.5 Lipid Profile (Chol, Trig, HDL, LDL calc) - FASTING   5. Gastroesophageal reflux disease without esophagitis K21.9 omeprazole (PRILOSEC) 20 MG CR capsule        Plan: Overall he appears to be doing well but he does not have acceptable control of his rheumatoid arthritis.  I did encourage him to seek rheumatology consultation and he is in agreement.  Consultation request is replaced.  No  medication changes in the meantime, his blood pressure and lipids are presumably well controlled.  Complete lab drawn and pending and I will send him a letter with the results with any recommendations.  Follow-up will be depending on how things progress as well as dependent on what occurs with his rheumatology consultation and recommendation for further therapy.

## 2018-03-29 ASSESSMENT — PATIENT HEALTH QUESTIONNAIRE - PHQ9: SUM OF ALL RESPONSES TO PHQ QUESTIONS 1-9: 0

## 2018-04-20 ENCOUNTER — TRANSFERRED RECORDS (OUTPATIENT)
Dept: HEALTH INFORMATION MANAGEMENT | Facility: OTHER | Age: 58
End: 2018-04-20

## 2018-04-30 DIAGNOSIS — M05.9 RHEUMATOID ARTHRITIS WITH POSITIVE RHEUMATOID FACTOR, INVOLVING UNSPECIFIED SITE (H): Primary | ICD-10-CM

## 2018-04-30 DIAGNOSIS — M06.9 ARTHRITIS, RHEUMATOID (H): ICD-10-CM

## 2018-04-30 RX ORDER — FOLIC ACID 1 MG/1
1 TABLET ORAL DAILY
Qty: 90 TABLET | Refills: 3 | Status: SHIPPED | OUTPATIENT
Start: 2018-04-30 | End: 2019-05-29

## 2018-04-30 NOTE — TELEPHONE ENCOUNTER
Routing refill request to provider for review/approval because:  High drug interaction warning with Methotrexate. Originally ordered by Laron Kennedy.     Folic acid Rx approved per [facility] Refill Protocol.  Tamia Heller RN on 4/30/2018 at 3:45 PM

## 2018-05-24 DIAGNOSIS — M06.9 RHEUMATOID ARTHRITIS, INVOLVING UNSPECIFIED SITE, UNSPECIFIED RHEUMATOID FACTOR PRESENCE: ICD-10-CM

## 2018-05-30 NOTE — TELEPHONE ENCOUNTER
Methotrexate refilled on 3/19/18 #48 x 1 refill. [facility]    Lexi Nunez RN on 5/30/2018 at 9:07 AM

## 2018-06-01 DIAGNOSIS — M06.9 RHEUMATOID ARTHRITIS, INVOLVING UNSPECIFIED SITE, UNSPECIFIED RHEUMATOID FACTOR PRESENCE: Primary | ICD-10-CM

## 2018-06-01 LAB
ALBUMIN SERPL-MCNC: 4.2 G/DL (ref 3.5–5.7)
ALT SERPL W P-5'-P-CCNC: 14 U/L (ref 7–52)
AST SERPL W P-5'-P-CCNC: 16 U/L (ref 13–39)
CREAT SERPL-MCNC: 1.23 MG/DL (ref 0.7–1.3)
ERYTHROCYTE [DISTWIDTH] IN BLOOD BY AUTOMATED COUNT: 13.2 % (ref 10–15)
ERYTHROCYTE [SEDIMENTATION RATE] IN BLOOD BY WESTERGREN METHOD: 6 MM/H (ref 1–10)
GFR SERPL CREATININE-BSD FRML MDRD: 60 ML/MIN/1.7M2
HCT VFR BLD AUTO: 41.1 % (ref 40–53)
HGB BLD-MCNC: 14.6 G/DL (ref 13.3–17.7)
MCH RBC QN AUTO: 33.2 PG (ref 26.5–33)
MCHC RBC AUTO-ENTMCNC: 35.5 G/DL (ref 31.5–36.5)
MCV RBC AUTO: 93 FL (ref 78–100)
PLATELET # BLD AUTO: 219 10E9/L (ref 150–450)
RBC # BLD AUTO: 4.4 10E12/L (ref 4.4–5.9)
RHEUMATOID FACT SER NEPH-ACNC: <14 IU/ML (ref 0–20)
WBC # BLD AUTO: 6 10E9/L (ref 4–11)

## 2018-06-01 PROCEDURE — 82040 ASSAY OF SERUM ALBUMIN: CPT

## 2018-06-01 PROCEDURE — 86706 HEP B SURFACE ANTIBODY: CPT

## 2018-06-01 PROCEDURE — 85027 COMPLETE CBC AUTOMATED: CPT

## 2018-06-01 PROCEDURE — 86803 HEPATITIS C AB TEST: CPT

## 2018-06-01 PROCEDURE — 82565 ASSAY OF CREATININE: CPT

## 2018-06-01 PROCEDURE — 36415 COLL VENOUS BLD VENIPUNCTURE: CPT

## 2018-06-01 PROCEDURE — 85652 RBC SED RATE AUTOMATED: CPT

## 2018-06-01 PROCEDURE — 84450 TRANSFERASE (AST) (SGOT): CPT

## 2018-06-01 PROCEDURE — 86431 RHEUMATOID FACTOR QUANT: CPT

## 2018-06-01 PROCEDURE — 87340 HEPATITIS B SURFACE AG IA: CPT

## 2018-06-01 PROCEDURE — 86705 HEP B CORE ANTIBODY IGM: CPT

## 2018-06-01 PROCEDURE — 84460 ALANINE AMINO (ALT) (SGPT): CPT

## 2018-06-04 LAB
HBV CORE IGM SERPL QL IA: NONREACTIVE
HBV SURFACE AB SERPL IA-ACNC: 0.15 M[IU]/ML
HBV SURFACE AG SERPL QL IA: NONREACTIVE
HCV AB SERPL QL IA: NONREACTIVE

## 2018-06-06 DIAGNOSIS — M06.9 RHEUMATOID ARTHRITIS, INVOLVING UNSPECIFIED SITE, UNSPECIFIED RHEUMATOID FACTOR PRESENCE: ICD-10-CM

## 2018-06-06 NOTE — TELEPHONE ENCOUNTER
methotrexate 2.5 MG tablet CHEMO  Take 4 tablets (10 mg) by mouth once a week      Last Written Prescription Date:  3/19/18  Last Fill Quantity: 48,   # refills: 1  Last Office Visit: 3/28/18  Future Office visit:       Routing refill request to provider for review/approval because:  Drug not on the [facility], [facility] or [facility] refill protocol or controlled substance

## 2018-07-24 NOTE — PROGRESS NOTES
Patient Information     Patient Name  Wicho Hawk MRN  [id number] Sex  Male   1960      Letter by Leonardo Gomes MD at      Author:  Leonardo Gomes MD Service:  (none) Author Type:  (none)    Filed:   Encounter Date:  3/10/2017 Status:  (Other)           Wicho Hawk  [address]  [facility] [address]          March 10, 2017    Dear Wicho,    Following are the tests completed during your last clinic visit:    Results for orders placed or performed in visit on 03/10/17      COMP METABOLIC PANEL      Result  Value Ref Range    SODIUM 129 (L) 133 - 143 mmol/L    POTASSIUM 3.7 3.5 - 5.1 mmol/L    CHLORIDE 98 98 - 107 mmol/L    CO2,TOTAL 23 21 - 31 mmol/L    ANION GAP 8 5 - 18                    GLUCOSE 150 (H) 70 - 105 mg/dL    CALCIUM 9.4 8.6 - 10.3 mg/dL    BUN 11 7 - 25 mg/dL    CREATININE 1.15 0.70 - 1.30 mg/dL    BUN/CREAT RATIO           10                    GFR if African American >60 >60 ml/min/1.73m2    GFR if not African American >60 >60 ml/min/1.73m2    ALBUMIN 4.0 3.5 - 5.7 g/dL    PROTEIN,TOTAL 7.6 6.4 - 8.9 g/dL    GLOBULIN                  3.6 2.0 - 3.7 g/dL    A/G RATIO 1.1 1.0 - 2.0                    BILIRUBIN,TOTAL 0.8 0.3 - 1.0 mg/dL    ALK PHOSPHATASE 70 34 - 104 IU/L    ALT (SGPT) 14 7 - 52 IU/L    AST (SGOT) 17 13 - 39 IU/L   LIPID PANEL      Result  Value Ref Range    CHOLESTEROL,TOTAL 158 <200 mg/dL    TRIGLYCERIDES 155 (H) <150 mg/dL    HDL CHOLESTEROL 40 23 - 92 mg/dL    NON-HDL CHOLESTEROL 118 <145 mg/dl    CHOL/HDL RATIO            3.95 <4.50                    LDL CHOLESTEROL 87 <100 mg/dL    PATIENT STATUS            NON-FASTING                   PSA TOTAL (DIAGNOSTIC)      Result  Value Ref Range    PSA TOTAL (DIAGNOSTIC) 3.898 (H) <=3.100 ng/mL   CBC WITH AUTO DIFFERENTIAL      Result  Value Ref Range    WHITE BLOOD COUNT         7.5 4.5 - 11.0 thou/cu mm    RED BLOOD COUNT           4.67 4.30 - 5.90 mil/cu mm    HEMOGLOBIN                15.2 13.5 - 17.5 g/dL     HEMATOCRIT                43.7 37.0 - 53.0 %    MCV                       94 80 - 100 fL    MCH                       32.6 26.0 - 34.0 pg    MCHC                      34.8 32.0 - 36.0 g/dL    RDW                       11.6 11.5 - 15.5 %    PLATELET COUNT            284 140 - 440 thou/cu mm    MPV                       6.8 6.5 - 11.0 fL    NEUTROPHILS               68.4 42.0 - 72.0 %    LYMPHOCYTES               20.5 20.0 - 44.0 %    MONOCYTES                 8.8 <12.0 %    EOSINOPHILS               1.7 <8.0 %    BASOPHILS                 0.7 <3.0 %    ABSOLUTE NEUTROPHILS      5.1 1.7 - 7.0 thou/cu mm    ABSOLUTE LYMPHOCYTES      1.5 0.9 - 2.9 thou/cu mm    ABSOLUTE MONOCYTES        0.7 <0.9 thou/cu mm    ABSOLUTE EOSINOPHILS      0.1 <0.5 thou/cu mm    ABSOLUTE BASOPHILS        0.1 <0.3 thou/cu mm         Your blood tests reveal that your PSA, which is a prostate test, is somewhat elevated. In addition, your glucose, which is a diabetes test, is somewhat elevated. Everything else looks fine. You need to have a follow-up office visit in 3 months. At that visit we will recheck your blood work and we will also check your prostate. We will proceed from there depending on the results. If you have any questions prior to then, feel free to contact me.    Sincerely,      Leonardo Gomes MD  Internal Medicine  [facility]

## 2018-09-11 DIAGNOSIS — M06.9 RHEUMATOID ARTHRITIS, INVOLVING UNSPECIFIED SITE, UNSPECIFIED RHEUMATOID FACTOR PRESENCE: ICD-10-CM

## 2018-09-11 LAB
ALBUMIN SERPL-MCNC: 4.3 G/DL (ref 3.5–5.7)
ALT SERPL W P-5'-P-CCNC: 18 U/L (ref 7–52)
AST SERPL W P-5'-P-CCNC: 21 U/L (ref 13–39)
CREAT SERPL-MCNC: 1.18 MG/DL (ref 0.7–1.3)
ERYTHROCYTE [DISTWIDTH] IN BLOOD BY AUTOMATED COUNT: 13 % (ref 10–15)
ERYTHROCYTE [SEDIMENTATION RATE] IN BLOOD BY WESTERGREN METHOD: 7 MM/H (ref 1–10)
GFR SERPL CREATININE-BSD FRML MDRD: 63 ML/MIN/1.7M2
HCT VFR BLD AUTO: 42.8 % (ref 40–53)
HGB BLD-MCNC: 15.1 G/DL (ref 13.3–17.7)
MCH RBC QN AUTO: 32.7 PG (ref 26.5–33)
MCHC RBC AUTO-ENTMCNC: 35.3 G/DL (ref 31.5–36.5)
MCV RBC AUTO: 93 FL (ref 78–100)
PLATELET # BLD AUTO: 236 10E9/L (ref 150–450)
RBC # BLD AUTO: 4.62 10E12/L (ref 4.4–5.9)
RHEUMATOID FACT SER NEPH-ACNC: <14 IU/ML (ref 0–20)
WBC # BLD AUTO: 6.1 10E9/L (ref 4–11)

## 2018-09-11 PROCEDURE — 84460 ALANINE AMINO (ALT) (SGPT): CPT

## 2018-09-11 PROCEDURE — 82565 ASSAY OF CREATININE: CPT

## 2018-09-11 PROCEDURE — 86803 HEPATITIS C AB TEST: CPT

## 2018-09-11 PROCEDURE — 87340 HEPATITIS B SURFACE AG IA: CPT

## 2018-09-11 PROCEDURE — 86706 HEP B SURFACE ANTIBODY: CPT

## 2018-09-11 PROCEDURE — 86705 HEP B CORE ANTIBODY IGM: CPT

## 2018-09-11 PROCEDURE — 82040 ASSAY OF SERUM ALBUMIN: CPT

## 2018-09-11 PROCEDURE — 36415 COLL VENOUS BLD VENIPUNCTURE: CPT

## 2018-09-11 PROCEDURE — 86431 RHEUMATOID FACTOR QUANT: CPT

## 2018-09-11 PROCEDURE — 85652 RBC SED RATE AUTOMATED: CPT

## 2018-09-11 PROCEDURE — 84450 TRANSFERASE (AST) (SGOT): CPT

## 2018-09-11 PROCEDURE — 85027 COMPLETE CBC AUTOMATED: CPT

## 2018-09-12 LAB
HBV CORE IGM SERPL QL IA: NONREACTIVE
HBV SURFACE AB SERPL IA-ACNC: 0.89 M[IU]/ML
HBV SURFACE AG SERPL QL IA: NONREACTIVE
HCV AB SERPL QL IA: NONREACTIVE

## 2018-11-05 DIAGNOSIS — M06.9 RHEUMATOID ARTHRITIS, INVOLVING UNSPECIFIED SITE, UNSPECIFIED RHEUMATOID FACTOR PRESENCE: ICD-10-CM

## 2018-11-05 NOTE — TELEPHONE ENCOUNTER
Called patient and patient states that his Methotrexate was increased from four tablets by mouth once weekly to six tablets by mouth weekly and is out needs next dose for Friday and believes it is the 2.5 mg tablet    Methotrexate updated as per patient.  Please review and sign if appropriate.    LOV: 3/28/18    Lexi Nunez RN on 11/5/2018 at 10:47 AM

## 2018-12-10 DIAGNOSIS — M06.9 RHEUMATOID ARTHRITIS, INVOLVING UNSPECIFIED SITE, UNSPECIFIED RHEUMATOID FACTOR PRESENCE: ICD-10-CM

## 2018-12-10 LAB
ALBUMIN SERPL-MCNC: 4.2 G/DL (ref 3.5–5.7)
ALT SERPL W P-5'-P-CCNC: 18 U/L (ref 7–52)
AST SERPL W P-5'-P-CCNC: 19 U/L (ref 13–39)
CREAT SERPL-MCNC: 1.17 MG/DL (ref 0.7–1.3)
ERYTHROCYTE [DISTWIDTH] IN BLOOD BY AUTOMATED COUNT: 13.2 % (ref 10–15)
ERYTHROCYTE [SEDIMENTATION RATE] IN BLOOD BY WESTERGREN METHOD: 7 MM/H (ref 1–10)
GFR SERPL CREATININE-BSD FRML MDRD: 64 ML/MIN/1.7M2
HCT VFR BLD AUTO: 42.8 % (ref 40–53)
HGB BLD-MCNC: 15 G/DL (ref 13.3–17.7)
MCH RBC QN AUTO: 32.3 PG (ref 26.5–33)
MCHC RBC AUTO-ENTMCNC: 35 G/DL (ref 31.5–36.5)
MCV RBC AUTO: 92 FL (ref 78–100)
PLATELET # BLD AUTO: 203 10E9/L (ref 150–450)
RBC # BLD AUTO: 4.64 10E12/L (ref 4.4–5.9)
RHEUMATOID FACT SER NEPH-ACNC: <14 IU/ML (ref 0–20)
WBC # BLD AUTO: 4.9 10E9/L (ref 4–11)

## 2018-12-10 PROCEDURE — 87340 HEPATITIS B SURFACE AG IA: CPT

## 2018-12-10 PROCEDURE — 82040 ASSAY OF SERUM ALBUMIN: CPT

## 2018-12-10 PROCEDURE — 84450 TRANSFERASE (AST) (SGOT): CPT

## 2018-12-10 PROCEDURE — 85027 COMPLETE CBC AUTOMATED: CPT

## 2018-12-10 PROCEDURE — 85652 RBC SED RATE AUTOMATED: CPT

## 2018-12-10 PROCEDURE — 84460 ALANINE AMINO (ALT) (SGPT): CPT

## 2018-12-10 PROCEDURE — 36415 COLL VENOUS BLD VENIPUNCTURE: CPT

## 2018-12-10 PROCEDURE — 86706 HEP B SURFACE ANTIBODY: CPT

## 2018-12-10 PROCEDURE — 86431 RHEUMATOID FACTOR QUANT: CPT

## 2018-12-10 PROCEDURE — 86803 HEPATITIS C AB TEST: CPT

## 2018-12-10 PROCEDURE — 82565 ASSAY OF CREATININE: CPT

## 2018-12-10 PROCEDURE — 86705 HEP B CORE ANTIBODY IGM: CPT

## 2018-12-12 LAB
HBV CORE IGM SERPL QL IA: NONREACTIVE
HBV SURFACE AB SERPL IA-ACNC: 0.4 M[IU]/ML
HBV SURFACE AG SERPL QL IA: NONREACTIVE
HCV AB SERPL QL IA: NONREACTIVE

## 2019-03-14 DIAGNOSIS — M06.9 RHEUMATOID ARTHRITIS, INVOLVING UNSPECIFIED SITE, UNSPECIFIED RHEUMATOID FACTOR PRESENCE: ICD-10-CM

## 2019-03-14 LAB
ALBUMIN SERPL-MCNC: 4.1 G/DL (ref 3.5–5.7)
ALT SERPL W P-5'-P-CCNC: 17 U/L (ref 7–52)
AST SERPL W P-5'-P-CCNC: 16 U/L (ref 13–39)
CREAT SERPL-MCNC: 1.19 MG/DL (ref 0.7–1.3)
ERYTHROCYTE [DISTWIDTH] IN BLOOD BY AUTOMATED COUNT: 13 % (ref 10–15)
ERYTHROCYTE [SEDIMENTATION RATE] IN BLOOD BY WESTERGREN METHOD: 3 MM/H (ref 1–10)
GFR SERPL CREATININE-BSD FRML MDRD: 62 ML/MIN/{1.73_M2}
HCT VFR BLD AUTO: 41.3 % (ref 40–53)
HGB BLD-MCNC: 15.4 G/DL (ref 13.3–17.7)
MCH RBC QN AUTO: 32.6 PG (ref 26.5–33)
MCHC RBC AUTO-ENTMCNC: 37.3 G/DL (ref 31.5–36.5)
MCV RBC AUTO: 88 FL (ref 78–100)
PLATELET # BLD AUTO: 219 10E9/L (ref 150–450)
RBC # BLD AUTO: 4.72 10E12/L (ref 4.4–5.9)
RHEUMATOID FACT SER NEPH-ACNC: <14 IU/ML (ref 0–20)
WBC # BLD AUTO: 4.1 10E9/L (ref 4–11)

## 2019-03-14 PROCEDURE — 86706 HEP B SURFACE ANTIBODY: CPT

## 2019-03-14 PROCEDURE — 36415 COLL VENOUS BLD VENIPUNCTURE: CPT

## 2019-03-14 PROCEDURE — 84450 TRANSFERASE (AST) (SGOT): CPT

## 2019-03-14 PROCEDURE — 85652 RBC SED RATE AUTOMATED: CPT

## 2019-03-14 PROCEDURE — 86705 HEP B CORE ANTIBODY IGM: CPT

## 2019-03-14 PROCEDURE — 82565 ASSAY OF CREATININE: CPT

## 2019-03-14 PROCEDURE — 85027 COMPLETE CBC AUTOMATED: CPT

## 2019-03-14 PROCEDURE — 84460 ALANINE AMINO (ALT) (SGPT): CPT

## 2019-03-14 PROCEDURE — 82040 ASSAY OF SERUM ALBUMIN: CPT

## 2019-03-14 PROCEDURE — 86431 RHEUMATOID FACTOR QUANT: CPT

## 2019-03-14 PROCEDURE — 86803 HEPATITIS C AB TEST: CPT

## 2019-03-14 PROCEDURE — 87340 HEPATITIS B SURFACE AG IA: CPT

## 2019-03-15 LAB
HBV CORE IGM SERPL QL IA: NONREACTIVE
HBV SURFACE AB SERPL IA-ACNC: 0.92 M[IU]/ML
HBV SURFACE AG SERPL QL IA: NONREACTIVE
HCV AB SERPL QL IA: NONREACTIVE

## 2019-04-30 DIAGNOSIS — K21.9 GASTROESOPHAGEAL REFLUX DISEASE WITHOUT ESOPHAGITIS: ICD-10-CM

## 2019-04-30 DIAGNOSIS — M06.9 RHEUMATOID ARTHRITIS, INVOLVING UNSPECIFIED SITE, UNSPECIFIED RHEUMATOID FACTOR PRESENCE: ICD-10-CM

## 2019-04-30 DIAGNOSIS — I10 ESSENTIAL HYPERTENSION: ICD-10-CM

## 2019-04-30 NOTE — LETTER
May 6, 2019      Wicho Hawk  [address]   [facility] [address]        A refill request was received from your pharmacy for Lisinopril and Methotrexate.    Additional refills require an office visit with  for annual review and labs.    Please call [phone] to schedule appointment.      Sincerely,        Refill Nurse

## 2019-05-02 NOTE — TELEPHONE ENCOUNTER
Routing refill request to provider for review/approval because:  Drug not on the [facility] refill protocol   Labs out of range:  sodium  Labs not current:  Potassium    LOV and labs 3/28/18    Letter sent  Left message to return call to inform he is due for labs and appointment and if patient has enough meds to get through until Monday with Dr. Gomes return.  Lexi Nunez RN on 5/2/2019 at 4:20 PM

## 2019-05-06 RX ORDER — LISINOPRIL AND HYDROCHLOROTHIAZIDE 20; 25 MG/1; MG/1
TABLET ORAL
Qty: 90 TABLET | Refills: 0 | Status: SHIPPED | OUTPATIENT
Start: 2019-05-06 | End: 2019-06-04

## 2019-05-06 NOTE — TELEPHONE ENCOUNTER
The patient was contacted and given the information below. He stated he will call and set up a appointment for a physical.  Linda Norwood LPN on 5/6/2019 at 8:25 AM

## 2019-05-29 DIAGNOSIS — M06.9 ARTHRITIS, RHEUMATOID (H): ICD-10-CM

## 2019-05-29 NOTE — LETTER
[facility]  [address]  [place] [address]  Phone: [phone]       Dear Wicho,     A request from Steve for folic acid

## 2019-05-31 RX ORDER — FOLIC ACID 1 MG/1
TABLET ORAL
Qty: 90 TABLET | Refills: 0 | Status: SHIPPED | OUTPATIENT
Start: 2019-05-31 | End: 2019-06-04

## 2019-05-31 NOTE — TELEPHONE ENCOUNTER
Steve in [place] is Requesting a refill for the following medication:      FOLIC ACID 1MG TABLETS    Will file in chart as: folic acid (FOLVITE) 1 MG tablet   Sig: TAKE 1 TABLET BY MOUTH EVERY DAY   Disp:  90 tablet    Refills:  0   Start: 5/29/2019   Class: E-Prescribe   For: Arthritis, rheumatoid (H)   Vitamin Supplements (Adult) Protocol Passed5/31 3:12 PM   High dose Vitamin D not ordered    Recent (12 mo) or future (30 days) visit within the authorizing provider's specialty    Medication is active on med list        Last Written Prescription Date:4/30/18    Last Fill Quantity: 90,  # refills: 3  Last office visit: 3/28/2018 with prescribing provider:  Dr. Gomes    Future Office Visit:   Next 5 appointments (look out 90 days)    Jun 04, 2019 10:40 AM CDT  SHORT with Leonardo Gomes MD  [facility] [address]  [place] [address]  [phone]         Noted patient will be seen 6/4/19. Limited fill provided at this time.    Laurel [place], RN on 5/31/2019 at 3:41 PM

## 2019-05-31 NOTE — TELEPHONE ENCOUNTER
"Requested Prescriptions   Pending Prescriptions Disp Refills     folic acid (FOLVITE) 1 MG tablet [Pharmacy Med Name: FOLIC ACID 1MG TABLETS] 90 tablet 0     Sig: TAKE 1 TABLET BY MOUTH EVERY DAY       Vitamin Supplements (Adult) Protocol Passed - 5/30/2019  3:39 PM        Passed - High dose Vitamin D not ordered        Passed - Recent (12 mo) or future (30 days) visit within the authorizing provider's specialty     Patient had office visit in the last 12 months or has a visit in the next 30 days with authorizing provider or within the authorizing provider's specialty.  See \"Patient Info\" tab in inbasket, or \"Choose Columns\" in Meds & Orders section of the refill encounter.              Passed - Medication is active on med list        LOV 3/28/18    Prescription was not approved per [facility] Refill Protocol.    Letter sent to patient reminding him to set up appointment with Dr. Gomes.    "

## 2019-06-04 ENCOUNTER — OFFICE VISIT (OUTPATIENT)
Dept: INTERNAL MEDICINE | Facility: OTHER | Age: 59
End: 2019-06-04
Attending: INTERNAL MEDICINE
Payer: COMMERCIAL

## 2019-06-04 VITALS
RESPIRATION RATE: 18 BRPM | TEMPERATURE: 97.3 F | WEIGHT: 213.3 LBS | DIASTOLIC BLOOD PRESSURE: 78 MMHG | HEART RATE: 72 BPM | BODY MASS INDEX: 29.33 KG/M2 | SYSTOLIC BLOOD PRESSURE: 122 MMHG

## 2019-06-04 DIAGNOSIS — Z12.11 SPECIAL SCREENING FOR MALIGNANT NEOPLASMS, COLON: ICD-10-CM

## 2019-06-04 DIAGNOSIS — I10 ESSENTIAL HYPERTENSION: ICD-10-CM

## 2019-06-04 DIAGNOSIS — K21.9 GASTROESOPHAGEAL REFLUX DISEASE WITHOUT ESOPHAGITIS: ICD-10-CM

## 2019-06-04 DIAGNOSIS — K62.1 RECTAL POLYP: ICD-10-CM

## 2019-06-04 DIAGNOSIS — M06.9 RHEUMATOID ARTHRITIS, INVOLVING UNSPECIFIED SITE, UNSPECIFIED RHEUMATOID FACTOR PRESENCE: ICD-10-CM

## 2019-06-04 DIAGNOSIS — N52.9 ERECTILE DYSFUNCTION, UNSPECIFIED ERECTILE DYSFUNCTION TYPE: ICD-10-CM

## 2019-06-04 DIAGNOSIS — M05.9 RHEUMATOID ARTHRITIS WITH POSITIVE RHEUMATOID FACTOR, INVOLVING UNSPECIFIED SITE (H): ICD-10-CM

## 2019-06-04 DIAGNOSIS — E78.5 HYPERLIPIDEMIA, UNSPECIFIED HYPERLIPIDEMIA TYPE: Primary | ICD-10-CM

## 2019-06-04 LAB
ALBUMIN SERPL-MCNC: 4.2 G/DL (ref 3.5–5.7)
ALP SERPL-CCNC: 67 U/L (ref 34–104)
ALT SERPL W P-5'-P-CCNC: 26 U/L (ref 7–52)
ANION GAP SERPL CALCULATED.3IONS-SCNC: 6 MMOL/L (ref 3–14)
AST SERPL W P-5'-P-CCNC: 23 U/L (ref 13–39)
BILIRUB SERPL-MCNC: 0.8 MG/DL (ref 0.3–1)
BUN SERPL-MCNC: 10 MG/DL (ref 7–25)
CALCIUM SERPL-MCNC: 8.9 MG/DL (ref 8.6–10.3)
CHLORIDE SERPL-SCNC: 96 MMOL/L (ref 98–107)
CHOLEST SERPL-MCNC: 155 MG/DL
CO2 SERPL-SCNC: 29 MMOL/L (ref 21–31)
CREAT SERPL-MCNC: 1.2 MG/DL (ref 0.7–1.3)
ERYTHROCYTE [DISTWIDTH] IN BLOOD BY AUTOMATED COUNT: 13.2 % (ref 10–15)
GFR SERPL CREATININE-BSD FRML MDRD: 62 ML/MIN/{1.73_M2}
GLUCOSE SERPL-MCNC: 108 MG/DL (ref 70–105)
HCT VFR BLD AUTO: 41.7 % (ref 40–53)
HDLC SERPL-MCNC: 40 MG/DL (ref 23–92)
HGB BLD-MCNC: 14.6 G/DL (ref 13.3–17.7)
LDLC SERPL CALC-MCNC: 87 MG/DL
MCH RBC QN AUTO: 32.7 PG (ref 26.5–33)
MCHC RBC AUTO-ENTMCNC: 35 G/DL (ref 31.5–36.5)
MCV RBC AUTO: 93 FL (ref 78–100)
NONHDLC SERPL-MCNC: 115 MG/DL
PLATELET # BLD AUTO: 232 10E9/L (ref 150–450)
POTASSIUM SERPL-SCNC: 4.1 MMOL/L (ref 3.5–5.1)
PROT SERPL-MCNC: 6.8 G/DL (ref 6.4–8.9)
PSA SERPL-MCNC: 3.77 NG/ML
RBC # BLD AUTO: 4.47 10E12/L (ref 4.4–5.9)
SODIUM SERPL-SCNC: 131 MMOL/L (ref 134–144)
TRIGL SERPL-MCNC: 142 MG/DL
WBC # BLD AUTO: 6.2 10E9/L (ref 4–11)

## 2019-06-04 PROCEDURE — 80053 COMPREHEN METABOLIC PANEL: CPT | Mod: ZL | Performed by: INTERNAL MEDICINE

## 2019-06-04 PROCEDURE — G0463 HOSPITAL OUTPT CLINIC VISIT: HCPCS | Mod: 25

## 2019-06-04 PROCEDURE — G0463 HOSPITAL OUTPT CLINIC VISIT: HCPCS

## 2019-06-04 PROCEDURE — 99215 OFFICE O/P EST HI 40 MIN: CPT | Performed by: INTERNAL MEDICINE

## 2019-06-04 PROCEDURE — 80061 LIPID PANEL: CPT | Mod: ZL | Performed by: INTERNAL MEDICINE

## 2019-06-04 PROCEDURE — 84153 ASSAY OF PSA TOTAL: CPT | Mod: ZL | Performed by: INTERNAL MEDICINE

## 2019-06-04 PROCEDURE — G0009 ADMIN PNEUMOCOCCAL VACCINE: HCPCS

## 2019-06-04 PROCEDURE — 36415 COLL VENOUS BLD VENIPUNCTURE: CPT | Mod: ZL | Performed by: INTERNAL MEDICINE

## 2019-06-04 PROCEDURE — 85027 COMPLETE CBC AUTOMATED: CPT | Mod: ZL | Performed by: INTERNAL MEDICINE

## 2019-06-04 PROCEDURE — 90670 PCV13 VACCINE IM: CPT

## 2019-06-04 RX ORDER — SILDENAFIL 100 MG/1
100 TABLET, FILM COATED ORAL DAILY PRN
COMMUNITY
Start: 2019-06-04 | End: 2019-06-04

## 2019-06-04 RX ORDER — FOLIC ACID 1 MG/1
1000 TABLET ORAL DAILY
Qty: 90 TABLET | Refills: 3 | Status: SHIPPED | OUTPATIENT
Start: 2019-06-04 | End: 2020-08-14

## 2019-06-04 RX ORDER — LISINOPRIL AND HYDROCHLOROTHIAZIDE 20; 25 MG/1; MG/1
1 TABLET ORAL DAILY
Qty: 90 TABLET | Refills: 3 | Status: SHIPPED | OUTPATIENT
Start: 2019-06-04 | End: 2020-08-14

## 2019-06-04 RX ORDER — SILDENAFIL 100 MG/1
100 TABLET, FILM COATED ORAL DAILY PRN
Qty: 10 TABLET | Refills: 5 | Status: SHIPPED | OUTPATIENT
Start: 2019-06-04 | End: 2020-08-14

## 2019-06-04 SDOH — HEALTH STABILITY: MENTAL HEALTH: HOW OFTEN DO YOU HAVE A DRINK CONTAINING ALCOHOL?: 2-3 TIMES A WEEK

## 2019-06-04 SDOH — HEALTH STABILITY: MENTAL HEALTH: HOW MANY STANDARD DRINKS CONTAINING ALCOHOL DO YOU HAVE ON A TYPICAL DAY?: 1 OR 2

## 2019-06-04 ASSESSMENT — ENCOUNTER SYMPTOMS
PALPITATIONS: 0
TREMORS: 0
ADENOPATHY: 0
NAUSEA: 0
HALLUCINATIONS: 0
EYE PAIN: 0
DIARRHEA: 0
ABDOMINAL PAIN: 0
CONFUSION: 0
CHEST TIGHTNESS: 0
BACK PAIN: 0
ABDOMINAL DISTENTION: 0
CHILLS: 0
SINUS PRESSURE: 0
DIFFICULTY URINATING: 0
LIGHT-HEADEDNESS: 0
NERVOUS/ANXIOUS: 0
NUMBNESS: 0
FREQUENCY: 0
SINUS PAIN: 0
VOMITING: 0
FEVER: 0
HEMATURIA: 0
DYSURIA: 0
WEAKNESS: 0
EYE REDNESS: 0
SHORTNESS OF BREATH: 0
SORE THROAT: 0
BLOOD IN STOOL: 0
WOUND: 0
JOINT SWELLING: 0
WHEEZING: 0
ARTHRALGIAS: 1
SLEEP DISTURBANCE: 0
DIAPHORESIS: 0
DIZZINESS: 0
NECK PAIN: 0
TROUBLE SWALLOWING: 0
RHINORRHEA: 0
BRUISES/BLEEDS EASILY: 0
FLANK PAIN: 0
AGITATION: 0
SPEECH DIFFICULTY: 0
VOICE CHANGE: 0
CONSTIPATION: 0
FATIGUE: 0
HEADACHES: 0
UNEXPECTED WEIGHT CHANGE: 0
APPETITE CHANGE: 0
NECK STIFFNESS: 0
SEIZURES: 0
COUGH: 0

## 2019-06-04 ASSESSMENT — PATIENT HEALTH QUESTIONNAIRE - PHQ9: SUM OF ALL RESPONSES TO PHQ QUESTIONS 1-9: 0

## 2019-06-04 NOTE — LETTER
June 4, 2019      Wicho Hawk  [address]  [place] [address]        Dear Wicho,    Below are the results of your recent labs:    Results for orders placed or performed in visit on 06/04/19   Prostate Specific Antigen   Result Value Ref Range    Prostate Specific Antigen 3.766 (H) <3.100 ng/mL   CBC W PLT No Diff   Result Value Ref Range    WBC 6.2 4.0 - 11.0 10e9/L    RBC Count 4.47 4.4 - 5.9 10e12/L    Hemoglobin 14.6 13.3 - 17.7 g/dL    Hematocrit 41.7 40.0 - 53.0 %    MCV 93 78 - 100 fl    MCH 32.7 26.5 - 33.0 pg    MCHC 35.0 31.5 - 36.5 g/dL    RDW 13.2 10.0 - 15.0 %    Platelet Count 232 150 - 450 10e9/L   Lipid Panel   Result Value Ref Range    Cholesterol 155 <200 mg/dL    Triglycerides 142 <150 mg/dL    HDL Cholesterol 40 23 - 92 mg/dL    LDL Cholesterol Calculated 87 <100 mg/dL    Non HDL Cholesterol 115 <130 mg/dL   Comprehensive Metabolic Panel   Result Value Ref Range    Sodium 131 (L) 134 - 144 mmol/L    Potassium 4.1 3.5 - 5.1 mmol/L    Chloride 96 (L) 98 - 107 mmol/L    Carbon Dioxide 29 21 - 31 mmol/L    Anion Gap 6 3 - 14 mmol/L    Glucose 108 (H) 70 - 105 mg/dL    Urea Nitrogen 10 7 - 25 mg/dL    Creatinine 1.20 0.70 - 1.30 mg/dL    GFR Estimate 62 >60 mL/min/[1.73_m2]    GFR Estimate If Black 75 >60 mL/min/[1.73_m2]    Calcium 8.9 8.6 - 10.3 mg/dL    Bilirubin Total 0.8 0.3 - 1.0 mg/dL    Albumin 4.2 3.5 - 5.7 g/dL    Protein Total 6.8 6.4 - 8.9 g/dL    Alkaline Phosphatase 67 34 - 104 U/L    ALT 26 7 - 52 U/L    AST 23 13 - 39 U/L        In general, your blood tests look fine aside from the fact that your PSA or prostate test is slightly elevated.  This is not terribly concerning as this can be slightly elevated as you get older and your prostate enlarges.  I will want you to have this rechecked again in 6 months so that we can monitor for change.  All the rest of your blood tests look fine.  If you have any questions about your results or other concerns, feel free to contact  me.    Sincerely,        Leonardo Goems MD  Internal Medicine  [facility]

## 2019-06-04 NOTE — NURSING NOTE
"The patient is here today to be seen for a refill on his medications.  Linda Norwood LPN on 6/4/2019 at 10:37 AM  Chief Complaint   Patient presents with     Recheck Medication       Initial /78 (BP Location: Right arm, Patient Position: Sitting, Cuff Size: Adult Regular)   Pulse 72   Temp 97.3  F (36.3  C) (Tympanic)   Resp 18   Wt 96.8 kg (213 lb 4.8 oz)   BMI 29.33 kg/m   Estimated body mass index is 29.33 kg/m  as calculated from the following:    Height as of 3/10/15: 1.816 m (5' 11.5\").    Weight as of this encounter: 96.8 kg (213 lb 4.8 oz).  Medication Reconciliation: complete    Linda Norwood LPN    "

## 2019-06-07 DIAGNOSIS — Z12.11 SPECIAL SCREENING FOR MALIGNANT NEOPLASMS, COLON: Primary | ICD-10-CM

## 2019-06-07 NOTE — TELEPHONE ENCOUNTER
Screening Questions for the Scheduling of Screening Colonoscopies   (If Colonoscopy is diagnostic, Provider should review the chart before scheduling.)  Are you younger than 50 or older than 80?  NO   Do you take aspirin or fish oil?  YES - ASPIRIN    (if yes, tell patient to stop 1 week prior to Colonoscopy)  Do you take warfarin (Coumadin), clopidogrel (Plavix), apixaban (Eliquis), dabigatram (Pradaxa), rivaroxaban (Xarelto) or any blood thinner? NO   Do you use oxygen at home?  NO   Do you have kidney disease? NO   Are you on dialysis? NO   Have you had a stroke or heart attack in the last year? NO   Have you had a stent in your heart or any blood vessel in the last year? NO   Have you had a transplant of any organ? NO   Have you had a colonoscopy or upper endoscopy (EGD) before? YES          When?  2009  Date of scheduled Colonoscopy.  07/26/2019  Provider  [facility]   Pharmacy [facility]

## 2019-06-10 RX ORDER — POLYETHYLENE GLYCOL 3350, SODIUM CHLORIDE, SODIUM BICARBONATE, POTASSIUM CHLORIDE 420; 11.2; 5.72; 1.48 G/4L; G/4L; G/4L; G/4L
4000 POWDER, FOR SOLUTION ORAL ONCE
Qty: 4000 ML | Refills: 0 | Status: ON HOLD | OUTPATIENT
Start: 2019-06-10 | End: 2019-07-26

## 2019-06-10 RX ORDER — BISACODYL 5 MG
TABLET, DELAYED RELEASE (ENTERIC COATED) ORAL
Qty: 2 TABLET | Refills: 0 | Status: ON HOLD | OUTPATIENT
Start: 2019-06-10 | End: 2019-07-26

## 2019-07-26 ENCOUNTER — ANESTHESIA EVENT (OUTPATIENT)
Dept: SURGERY | Facility: OTHER | Age: 59
End: 2019-07-26
Payer: COMMERCIAL

## 2019-07-26 ENCOUNTER — ANESTHESIA (OUTPATIENT)
Dept: SURGERY | Facility: OTHER | Age: 59
End: 2019-07-26
Payer: COMMERCIAL

## 2019-07-26 ENCOUNTER — HOSPITAL ENCOUNTER (OUTPATIENT)
Facility: OTHER | Age: 59
Discharge: HOME | End: 2019-07-26
Attending: SURGERY | Admitting: SURGERY
Payer: COMMERCIAL

## 2019-07-26 VITALS
WEIGHT: 210 LBS | SYSTOLIC BLOOD PRESSURE: 127 MMHG | TEMPERATURE: 97.2 F | RESPIRATION RATE: 16 BRPM | OXYGEN SATURATION: 99 % | BODY MASS INDEX: 28.88 KG/M2 | HEART RATE: 59 BPM | DIASTOLIC BLOOD PRESSURE: 82 MMHG

## 2019-07-26 DIAGNOSIS — K63.5 POLYP OF SIGMOID COLON, UNSPECIFIED TYPE: Primary | ICD-10-CM

## 2019-07-26 DIAGNOSIS — K57.30 DIVERTICULOSIS OF COLON WITHOUT DIVERTICULITIS: ICD-10-CM

## 2019-07-26 PROCEDURE — 25800030 ZZH RX IP 258 OP 636: Performed by: SURGERY

## 2019-07-26 PROCEDURE — 45384 COLONOSCOPY W/LESION REMOVAL: CPT | Performed by: NURSE ANESTHETIST, CERTIFIED REGISTERED

## 2019-07-26 PROCEDURE — 25000128 H RX IP 250 OP 636: Performed by: NURSE ANESTHETIST, CERTIFIED REGISTERED

## 2019-07-26 PROCEDURE — 45380 COLONOSCOPY AND BIOPSY: CPT | Performed by: SURGERY

## 2019-07-26 PROCEDURE — 25000125 ZZHC RX 250: Performed by: NURSE ANESTHETIST, CERTIFIED REGISTERED

## 2019-07-26 PROCEDURE — 45384 COLONOSCOPY W/LESION REMOVAL: CPT | Mod: PT | Performed by: SURGERY

## 2019-07-26 PROCEDURE — 88305 TISSUE EXAM BY PATHOLOGIST: CPT

## 2019-07-26 PROCEDURE — 40000010 ZZH STATISTIC ANES STAT CODE-CRNA PER MINUTE: Performed by: SURGERY

## 2019-07-26 RX ORDER — PROPOFOL 10 MG/ML
INJECTION, EMULSION INTRAVENOUS PRN
Status: DISCONTINUED | OUTPATIENT
Start: 2019-07-26 | End: 2019-07-26

## 2019-07-26 RX ORDER — SODIUM CHLORIDE, SODIUM LACTATE, POTASSIUM CHLORIDE, CALCIUM CHLORIDE 600; 310; 30; 20 MG/100ML; MG/100ML; MG/100ML; MG/100ML
INJECTION, SOLUTION INTRAVENOUS CONTINUOUS
Status: DISCONTINUED | OUTPATIENT
Start: 2019-07-26 | End: 2019-07-26 | Stop reason: HOSPADM

## 2019-07-26 RX ORDER — ONDANSETRON 2 MG/ML
4 INJECTION INTRAMUSCULAR; INTRAVENOUS EVERY 6 HOURS PRN
Status: DISCONTINUED | OUTPATIENT
Start: 2019-07-26 | End: 2019-07-26 | Stop reason: HOSPADM

## 2019-07-26 RX ORDER — NALOXONE HYDROCHLORIDE 0.4 MG/ML
.1-.4 INJECTION, SOLUTION INTRAMUSCULAR; INTRAVENOUS; SUBCUTANEOUS
Status: DISCONTINUED | OUTPATIENT
Start: 2019-07-26 | End: 2019-07-26 | Stop reason: HOSPADM

## 2019-07-26 RX ORDER — FLUMAZENIL 0.1 MG/ML
0.2 INJECTION, SOLUTION INTRAVENOUS
Status: DISCONTINUED | OUTPATIENT
Start: 2019-07-26 | End: 2019-07-26 | Stop reason: HOSPADM

## 2019-07-26 RX ORDER — ONDANSETRON 2 MG/ML
4 INJECTION INTRAMUSCULAR; INTRAVENOUS
Status: DISCONTINUED | OUTPATIENT
Start: 2019-07-26 | End: 2019-07-26 | Stop reason: HOSPADM

## 2019-07-26 RX ORDER — LIDOCAINE HYDROCHLORIDE 20 MG/ML
INJECTION, SOLUTION INFILTRATION; PERINEURAL PRN
Status: DISCONTINUED | OUTPATIENT
Start: 2019-07-26 | End: 2019-07-26

## 2019-07-26 RX ORDER — PROPOFOL 10 MG/ML
INJECTION, EMULSION INTRAVENOUS CONTINUOUS PRN
Status: DISCONTINUED | OUTPATIENT
Start: 2019-07-26 | End: 2019-07-26

## 2019-07-26 RX ORDER — ONDANSETRON 4 MG/1
4 TABLET, ORALLY DISINTEGRATING ORAL EVERY 6 HOURS PRN
Status: DISCONTINUED | OUTPATIENT
Start: 2019-07-26 | End: 2019-07-26 | Stop reason: HOSPADM

## 2019-07-26 RX ORDER — LIDOCAINE 40 MG/G
CREAM TOPICAL
Status: DISCONTINUED | OUTPATIENT
Start: 2019-07-26 | End: 2019-07-26 | Stop reason: HOSPADM

## 2019-07-26 RX ADMIN — PROPOFOL 100 MG: 10 INJECTION, EMULSION INTRAVENOUS at 09:09

## 2019-07-26 RX ADMIN — PROPOFOL 140 MCG/KG/MIN: 10 INJECTION, EMULSION INTRAVENOUS at 09:09

## 2019-07-26 RX ADMIN — SODIUM CHLORIDE, POTASSIUM CHLORIDE, SODIUM LACTATE AND CALCIUM CHLORIDE: 600; 310; 30; 20 INJECTION, SOLUTION INTRAVENOUS at 08:50

## 2019-07-26 RX ADMIN — LIDOCAINE HYDROCHLORIDE 60 MG: 20 INJECTION, SOLUTION INFILTRATION; PERINEURAL at 09:09

## 2019-07-26 NOTE — ANESTHESIA PREPROCEDURE EVALUATION
Anesthesia Pre-Procedure Evaluation    Patient: Wicho Hawk   MRN: [id number] : 1960          Preoperative Diagnosis: screening    Procedure(s):  COLONOSCOPY    Past Medical History:   Diagnosis Date     Equivocal stress test     Cardiolyte     Essential (primary) hypertension     No Comments Provided     Gastro-esophageal reflux disease without esophagitis     No Comments Provided     Hyperlipidemia     No Comments Provided     Male erectile dysfunction     2014     Rheumatoid arthritis (H)     No Comments Provided     Past Surgical History:   Procedure Laterality Date     ARTHROSCOPY KNEE Left     No Comments Provided     ARTHROSCOPY SHOULDER Left     2011     ARTHROTOMY WRIST Left     Wrist surgery with fusion.     COLONOSCOPY  2009,Normal screening colonoscopy, follow up recommended in 10 years     ELBOW SURGERY Right     Right elbow surgery for bone spurs/chips     ESOPHAGOSCOPY, GASTROSCOPY, DUODENOSCOPY (EGD), COMBINED      2015,[place]     VASECTOMY      No Comments Provided       Anesthesia Evaluation     . Pt has had prior anesthetic. Type: MAC and General           ROS/MED HX    ENT/Pulmonary:     (+)JUSTYNA risk factors snores loudly, hypertension, , . .    Neurologic:  - neg neurologic ROS     Cardiovascular:     (+) hypertension----. : . . . :. .       METS/Exercise Tolerance:  4 - Raking leaves, gardening   Hematologic:  - neg hematologic  ROS       Musculoskeletal: Comment: RA  (+) arthritis,  -       GI/Hepatic:  - neg GI/hepatic ROS       Renal/Genitourinary:  - ROS Renal section negative       Endo:  - neg endo ROS       Psychiatric:  - neg psychiatric ROS       Infectious Disease:  - neg infectious disease ROS       Malignancy:      - no malignancy   Other:                          Physical Exam  Normal systems: pulmonary and dental    Airway   Mallampati: II  TM distance: >3 FB  Neck ROM: full    Dental     Cardiovascular   Rhythm and rate: regular and  "normal      Pulmonary             Lab Results   Component Value Date    WBC 6.2 06/04/2019    HGB 14.6 06/04/2019    HCT 41.7 06/04/2019     06/04/2019    SED 3 03/14/2019     (L) 06/04/2019    POTASSIUM 4.1 06/04/2019    CHLORIDE 96 (L) 06/04/2019    CO2 29 06/04/2019    BUN 10 06/04/2019    CR 1.20 06/04/2019     (H) 06/04/2019    ARIEL 8.9 06/04/2019    ALBUMIN 4.2 06/04/2019    PROTTOTAL 6.8 06/04/2019    ALT 26 06/04/2019    AST 23 06/04/2019    ALKPHOS 67 06/04/2019    BILITOTAL 0.8 06/04/2019       Preop Vitals  BP Readings from Last 3 Encounters:   07/26/19 (!) 120/91   06/04/19 122/78   03/28/18 118/78    Pulse Readings from Last 3 Encounters:   07/26/19 71   06/04/19 72   03/28/18 88      Resp Readings from Last 3 Encounters:   07/26/19 16   06/04/19 18    SpO2 Readings from Last 3 Encounters:   07/26/19 97%      Temp Readings from Last 1 Encounters:   07/26/19 96.7  F (35.9  C) (Tympanic)    Ht Readings from Last 1 Encounters:   03/10/15 1.816 m (5' 11.5\")      Wt Readings from Last 1 Encounters:   07/26/19 95.3 kg (210 lb)    Estimated body mass index is 28.88 kg/m  as calculated from the following:    Height as of 3/10/15: 1.816 m (5' 11.5\").    Weight as of this encounter: 95.3 kg (210 lb).       Anesthesia Plan      History & Physical Review      ASA Status:  2 .    NPO Status:  > 6 hours    Plan for MAC with Propofol induction.          Postoperative Care      Consents  Anesthetic plan, risks, benefits and alternatives discussed with:  Patient and Spouse..                 NIELS YOUNG CRNA  "

## 2019-07-26 NOTE — H&P
PRE-PROCEDURE NOTE    CHIEF COMPLAINT / REASON FORPROCEDURE:  Need for screening colonoscopy.    PERTINENT HISTORY   Patient with no complaints. Previous colonoscopy 2009-no polyps. No diarrhea, constipation, abdominal pain or rectal bleeding. No family history of colon polyps or colon cancer.  Past Medical History:   Diagnosis Date     Equivocal stress test     Cardiolyte     Essential (primary) hypertension     No Comments Provided     Gastro-esophageal reflux disease without esophagitis     No Comments Provided     Hyperlipidemia     No Comments Provided     Male erectile dysfunction     7/2/2014     Rheumatoid arthritis (H)     No Comments Provided     Past Surgical History:   Procedure Laterality Date     ARTHROSCOPY KNEE Left     No Comments Provided     ARTHROSCOPY SHOULDER Left     08/2011     ARTHROTOMY WRIST Left     Wrist surgery with fusion.     COLONOSCOPY  05/01/2009 5/01/2009,Normal screening colonoscopy, follow up recommended in 10 years     ELBOW SURGERY Right     Right elbow surgery for bone spurs/chips     ESOPHAGOSCOPY, GASTROSCOPY, DUODENOSCOPY (EGD), COMBINED      12/2015,[place]     VASECTOMY      No Comments Provided     Other:  None  Bleeding tendencies:  No    Relevant Family History:  none    Relevant Social History:  none    A relevant review of systems was performed and was Negative.    ALLERGIES/SENSITIVITIES: No Known Allergies     CURRENTMEDICATIONS:      No current facility-administered medications on file prior to encounter.   Current Outpatient Medications on File Prior to Encounter:  aspirin (ASA) 81 MG tablet Take 1 tablet (81 mg) by mouth daily   folic acid (FOLVITE) 1 MG tablet Take 1 tablet (1,000 mcg) by mouth daily   lisinopril-hydrochlorothiazide (PRINZIDE/ZESTORETIC) 20-25 MG tablet Take 1 tablet by mouth daily   methotrexate 2.5 MG tablet TAKE 6 TABLETS BY MOUTH 1 TIME WEEKLY   nabumetone (RELAFEN) 750 MG tablet Take 1 tablet (750 mg) by mouth 2 times daily as needed  for moderate pain   omeprazole (PRILOSEC) 20 MG DR capsule Take 1 capsule (20 mg) by mouth daily   vitamin D3 (CHOLECALCIFEROL) 1000 units (25 mcg) tablet Take by mouth daily   sildenafil (VIAGRA) 100 MG tablet Take 1 tablet (100 mg) by mouth daily as needed     Current Facility-Administered Medications   Medication     lactated ringers infusion     lidocaine (LMX4) cream     lidocaine 1 % 0.1-1 mL     ondansetron (ZOFRAN) injection 4 mg     sodium chloride (PF) 0.9% PF flush 3 mL     sodium chloride (PF) 0.9% PF flush 3 mL       PRE-SEDATION ASSESSMENT:    BP (!) 120/91   Pulse 71   Temp 96.7  F (35.9  C) (Tympanic)   Resp 16   Wt 95.3 kg (210 lb)   SpO2 97%   BMI 28.88 kg/m    Lung Exam:  Normal  Heart Exam:  Normal    Comment(s):      IMPRESSION:  Need for screening colonoscopy.    PLAN:  I discussed screening colonoscopy with the patient.

## 2019-07-26 NOTE — ANESTHESIA POSTPROCEDURE EVALUATION
Patient: Wicho Hawk    Procedure(s):  COLONOSCOPY, WITH POLYPECTOMY AND BIOPSY    Diagnosis:screening  Diagnosis Additional Information: No value filed.    Anesthesia Type:  MAC    Note:  Anesthesia Post Evaluation    Patient location during evaluation: Phase 2  Patient participation: Able to fully participate in evaluation  Level of consciousness: awake and alert  Pain management: adequate  Airway patency: patent  Cardiovascular status: acceptable  Respiratory status: acceptable  Hydration status: acceptable  PONV: none             Last vitals:  Vitals:    07/26/19 0835   BP: (!) 120/91   Pulse: 71   Resp: 16   Temp: 96.7  F (35.9  C)   SpO2: 97%         Electronically Signed By: NIELS YOUNG CRNA  July 26, 2019  9:59 AM

## 2019-07-26 NOTE — DISCHARGE INSTRUCTIONS
Procedure you had done: colonoscopy with removal of polyp  Your health care provider is:  Leonardo Gomes  Your surgeon is Dr. Courtney Huang.   Please call your health care provider or surgeon at [phone] if:    - you feel you are getting worse or having an increase in problems    - fever greater than 101 degrees  - increasing shortness of breath or chest pain  - any signs of infection (increasing redness, swelling, tenderness, warmth, change in appearance, or  increased drainage)  - blood in your urine or stool  - coughing or vomiting blood  - nausea (upset stomach) and vomiting and/or diarrhea that will not stop  - severe pain that is not relieved by medicine, rest or ice  You have had medications for sedation. Please be aware that this can cause drowsiness and impaired judgment for up to 24 hours after your procedure. Do not drive, operate power tools or drink alcohol for 24 hours.  If samples were taken-you will get a phone call and a letter with your results in the next 7-10 days. If you don't get results, please call and let us know!       [facility]  Adult Discharge Orders & Instructions          For 12 hours after surgery  1. Get plenty of rest.  A responsible adult must stay with you for at least 12 hours after you leave the hospital.   2. You may feel lightheaded.  IF so, sit for a few minutes before standing.  Have someone help you get up.   3. You may have a slight fever. Call the doctor if your fever is over 101 F (38.3 C) (taken under the tongue) or lasts longer than 24 hours.  4. You may have a dry mouth, a sore throat, muscle aches or trouble sleeping.  These should go away after 24 hours.  5. Do not make important or legal decisions.  6.   Do not drive or use heavy equipment.  If you have weakness or tingling, don't drive or use heavy equipment until this feeling goes away.    To contact a doctor, call   [phone]

## 2019-07-26 NOTE — ANESTHESIA CARE TRANSFER NOTE
Patient: Wicho Hawk    Procedure(s):  COLONOSCOPY, WITH POLYPECTOMY AND BIOPSY    Diagnosis: screening  Diagnosis Additional Information: No value filed.    Anesthesia Type:   MAC     Note:  Airway :Room Air  Patient transferred to:Phase II  Handoff Report: Identifed the Patient, Identified the Reponsible Provider, Reviewed the pertinent medical history, Discussed the surgical course, Reviewed Intra-OP anesthesia mangement and issues during anesthesia, Set expectations for post-procedure period and Allowed opportunity for questions and acknowledgement of understanding      Vitals: (Last set prior to Anesthesia Care Transfer)    CRNA VITALS  7/26/2019 0856 - 7/26/2019 0926      7/26/2019             Pulse:  72    Ht Rate:  72    SpO2:  97 %    Resp Rate (set):  10                Electronically Signed By: NIELS Luna CRNA  July 26, 2019  9:26 AM

## 2019-07-26 NOTE — OP NOTE
PROCEDURE NOTE    SURGEON: Courtney Duff MD.    PRE-OP DIAGNOSIS:  Screening Colonoscopy      POST-OP DIAGNOSIS: colon polyp diverticula sigmoid colon     Location: sigmoid Size: 0.2 cm  Removed:  y    PROCEDURE:  Colonoscopy with polypectomy hot forceps    ESTIMATEDBLOOD LOSS: none    COMPLICATIONS:  None    SPECIMEN:  Sigmoid polyp    ANESTHESIA:  See anesthesia note    INDICATION FOR THE PROCEDURE: The patient is a 58 year old male. The patient has no complaints. I explained to the patient the risks, benefits and alternatives to screening colonoscopy for evaluating the colon for colon polyps and colon cancer. We specifically discussed the risks of bleeding, infection, perforation, potential inability to reach the cecum and the risks of sedation. The patient's questions were answered and the patient wished to proceed. Informed consent paperwork was completed.    PROCEDURE: The patient was taken to the endoscopy suite. Appropriate monitors were attached. The patient was placed in the left lateral decubitus position.Timeout was performed confirming the patient's identity and procedure to be performed. After appropriate sedation was confirmed, digital rectal exam was performed. There was normal tone and no gross abnormality was noted. The lubricated colonoscope was introduced into the anus the colon was insufflated with air. The prep quality was adequate. Under direct visualization the scope was advanced to the cecum. The ileocecal valve was intubated and the terminal ileum inspected. No gross abnormality was noted. The scope was withdrawn back into the cecum. The mucosa of colon was inspected while withdrawing the scope. A tiny polyp was noted in the sigmoid colon and removed with hot forceps. The scope was retroflexed in the rectum and the anorectal junction was inspected. No abnormalities were noted. The scope was returned to a neutral position and the colon was decompressed. The scope was removed. The patient  tolerated the procedure with no immediately apparent complication. The patient was taken to recovery in stable condition.  FOLLOW UP:  RECOMMEND high fiber diet, follow up: will call with pathology results.

## 2019-07-30 DIAGNOSIS — K21.9 GASTROESOPHAGEAL REFLUX DISEASE WITHOUT ESOPHAGITIS: ICD-10-CM

## 2019-07-30 DIAGNOSIS — I10 ESSENTIAL HYPERTENSION: ICD-10-CM

## 2019-08-01 NOTE — RESULT ENCOUNTER NOTE
Shanda/Erica/Shaye-Please call patient and let them know the colon polyp that was removed was a tubular adenoma. This type of polyp has a low risk of being associated with a colon cancer. Follow up colonoscopy is recommended in 5 years to check for new polyps. High fiber diet is recommended for colon health. Patient should call with questions or concerns. Thanks!

## 2019-08-02 RX ORDER — LISINOPRIL AND HYDROCHLOROTHIAZIDE 20; 25 MG/1; MG/1
TABLET ORAL
Qty: 90 TABLET | Refills: 0 | OUTPATIENT
Start: 2019-08-02

## 2019-09-05 DIAGNOSIS — M06.9 RHEUMATOID ARTHRITIS, INVOLVING UNSPECIFIED SITE, UNSPECIFIED RHEUMATOID FACTOR PRESENCE: ICD-10-CM

## 2019-09-05 PROCEDURE — 87340 HEPATITIS B SURFACE AG IA: CPT | Mod: ZL

## 2019-09-05 PROCEDURE — 85652 RBC SED RATE AUTOMATED: CPT | Mod: ZL

## 2019-09-05 PROCEDURE — 86803 HEPATITIS C AB TEST: CPT | Mod: ZL

## 2019-09-05 PROCEDURE — 84460 ALANINE AMINO (ALT) (SGPT): CPT | Mod: ZL

## 2019-09-05 PROCEDURE — 86431 RHEUMATOID FACTOR QUANT: CPT | Mod: ZL

## 2019-09-05 PROCEDURE — 86706 HEP B SURFACE ANTIBODY: CPT | Mod: ZL

## 2019-09-05 PROCEDURE — 84450 TRANSFERASE (AST) (SGOT): CPT | Mod: ZL

## 2019-09-05 PROCEDURE — 86705 HEP B CORE ANTIBODY IGM: CPT | Mod: ZL

## 2019-09-05 PROCEDURE — 36415 COLL VENOUS BLD VENIPUNCTURE: CPT | Mod: ZL

## 2019-09-05 PROCEDURE — 82565 ASSAY OF CREATININE: CPT | Mod: ZL

## 2019-09-05 PROCEDURE — 85027 COMPLETE CBC AUTOMATED: CPT | Mod: ZL

## 2019-09-05 PROCEDURE — 82040 ASSAY OF SERUM ALBUMIN: CPT | Mod: ZL

## 2019-09-06 LAB
ALBUMIN SERPL-MCNC: NORMAL G/DL (ref 3.4–5)
ALT SERPL W P-5'-P-CCNC: NORMAL U/L (ref 0–70)
AST SERPL W P-5'-P-CCNC: NORMAL U/L (ref 0–45)
CREAT SERPL-MCNC: NORMAL MG/DL (ref 0.66–1.25)
ERYTHROCYTE [DISTWIDTH] IN BLOOD BY AUTOMATED COUNT: NORMAL % (ref 10–15)
ERYTHROCYTE [SEDIMENTATION RATE] IN BLOOD BY WESTERGREN METHOD: NORMAL MM/H (ref 0–20)
GFR SERPL CREATININE-BSD FRML MDRD: NORMAL ML/MIN/{1.73_M2}
HBV CORE IGM SERPL QL IA: NORMAL
HBV SURFACE AB SERPL IA-ACNC: NORMAL M[IU]/ML
HBV SURFACE AG SERPL QL IA: NORMAL
HCT VFR BLD AUTO: NORMAL % (ref 40–53)
HCV AB SERPL QL IA: NORMAL
HGB BLD-MCNC: NORMAL G/DL (ref 13.3–17.7)
MCH RBC QN AUTO: NORMAL PG (ref 26.5–33)
MCHC RBC AUTO-ENTMCNC: NORMAL G/DL (ref 31.5–36.5)
MCV RBC AUTO: NORMAL FL (ref 78–100)
PLATELET # BLD AUTO: NORMAL 10E9/L (ref 150–450)
RBC # BLD AUTO: NORMAL 10E12/L (ref 4.4–5.9)
RHEUMATOID FACT SER NEPH-ACNC: NORMAL IU/ML (ref 0–20)
WBC # BLD AUTO: NORMAL 10E9/L (ref 4–11)

## 2019-10-28 DIAGNOSIS — M06.9 RHEUMATOID ARTHRITIS, INVOLVING UNSPECIFIED SITE, UNSPECIFIED RHEUMATOID FACTOR PRESENCE: ICD-10-CM

## 2019-10-28 DIAGNOSIS — M06.9 ARTHRITIS, RHEUMATOID (H): ICD-10-CM

## 2019-10-28 RX ORDER — FOLIC ACID 1 MG/1
TABLET ORAL
Qty: 90 TABLET | Refills: 2 | Status: SHIPPED | OUTPATIENT
Start: 2019-10-28 | End: 2020-06-24

## 2019-10-28 NOTE — TELEPHONE ENCOUNTER
"Requested Prescriptions   Pending Prescriptions Disp Refills     folic acid (FOLVITE) 1 MG tablet [Pharmacy Med Name: FOLIC ACID 1MG TABLETS] 90 tablet 0     Sig: TAKE 1 TABLET BY MOUTH EVERY DAY       Vitamin Supplements (Adult) Protocol Passed - 10/28/2019  5:06 AM        Passed - High dose Vitamin D not ordered        Passed - Recent (12 mo) or future (30 days) visit within the authorizing provider's specialty     Patient has had an office visit with the authorizing provider or a provider within the authorizing providers department within the previous 12 mos or has a future within next 30 days. See \"Patient Info\" tab in inbasket, or \"Choose Columns\" in Meds & Orders section of the refill encounter.              Passed - Medication is active on med list        lov 06/04/19  Prescription approved per [facility] Refill Protocol.    "

## 2019-11-01 RX ORDER — METHOTREXATE 2.5 MG/1
TABLET ORAL
Qty: 72 TABLET | Refills: 0 | Status: SHIPPED | OUTPATIENT
Start: 2019-11-01 | End: 2020-01-24

## 2019-11-01 NOTE — TELEPHONE ENCOUNTER
Routing refill request to provider for review/approval because:  Drug not on the [facility] refill protocol     LOV: 6/4/19  Dr. Gomes out of office  Called and left a message for patient to return to call to see if he has enough to get through until monday  Dr. Gomes out of office unable to contact patient will route to covering team let for review and consideration  Lexi Nunze RN on 11/1/2019 at 1:01 PM

## 2020-01-24 DIAGNOSIS — M06.9 RHEUMATOID ARTHRITIS, INVOLVING UNSPECIFIED SITE, UNSPECIFIED RHEUMATOID FACTOR PRESENCE: ICD-10-CM

## 2020-01-24 RX ORDER — METHOTREXATE 2.5 MG/1
TABLET ORAL
Qty: 72 TABLET | Refills: 0 | Status: SHIPPED | OUTPATIENT
Start: 2020-01-24 | End: 2020-04-15

## 2020-01-24 NOTE — TELEPHONE ENCOUNTER
Requested Prescriptions   Pending Prescriptions Disp Refills     methotrexate sodium 2.5 MG TABS [Pharmacy Med Name: Methotrexate Sodium 2.5 MG Oral Tablet]  0     Sig: TAKE 6 TABLETS BY MOUTH ONCE A WEEK       There is no refill protocol information for this order        Last Written Prescription Date:  11/01/2019  Last Fill Quantity: 72,   # refills: 0  Last Office Visit: 06/04/2019 with Leonardo Gomes MD  Future Office visit:   None noted Unable to complete prescription refill per RN medication refill policy. Will route to provider for review and consideration.  Kira Ramos RN on 1/24/2020 at 11:59 AM

## 2020-03-11 ENCOUNTER — HEALTH MAINTENANCE LETTER (OUTPATIENT)
Age: 60
End: 2020-03-11

## 2020-04-03 ENCOUNTER — TELEPHONE (OUTPATIENT)
Dept: INTERNAL MEDICINE | Facility: OTHER | Age: 60
End: 2020-04-03

## 2020-04-03 NOTE — TELEPHONE ENCOUNTER
Called and verified patient full name and . Notified patient that PCP is out until Monday. He would like providers opinion about whether he should wear a mask or not or not work due to him having vulnerable [employer] System. He is currently working as a  for [employer]. He has a mask but has not been wearing it. Does not work again until Tuesday.     Ivy Berg LPN on 4/3/2020 at 12:57 PM

## 2020-04-15 DIAGNOSIS — M06.9 RHEUMATOID ARTHRITIS, INVOLVING UNSPECIFIED SITE, UNSPECIFIED RHEUMATOID FACTOR PRESENCE: ICD-10-CM

## 2020-04-15 RX ORDER — METHOTREXATE 2.5 MG/1
TABLET ORAL
Qty: 72 TABLET | Refills: 0 | Status: SHIPPED | OUTPATIENT
Start: 2020-04-15 | End: 2020-07-14

## 2020-04-15 NOTE — TELEPHONE ENCOUNTER
Routing refill request to provider for review/approval because:  Drug not on the [facility] refill protocol   LOV: 6/4/19  Lexi Nunez RN on 4/15/2020 at 4:29 PM

## 2020-06-24 DIAGNOSIS — M06.9 ARTHRITIS, RHEUMATOID (H): ICD-10-CM

## 2020-06-24 RX ORDER — FOLIC ACID 1 MG/1
TABLET ORAL
Qty: 90 TABLET | Refills: 0 | Status: SHIPPED | OUTPATIENT
Start: 2020-06-24 | End: 2020-08-14

## 2020-06-24 NOTE — TELEPHONE ENCOUNTER
A LIMITED 90 day refill will be provided to patient; patient contacted via letter to set up appointment with PCP. Prescription refilled per RN Medication Refill Policy.................... Jyoti Machado RN ....................  6/24/2020   4:32 PM    Last refill  Folic acid 1mg tablet  10/28/2019  90# 2 Refills    LOV:6/4/2019  No future appointments Jyoti Machado RN on 6/24/2020 at 4:31 PM

## 2020-06-24 NOTE — LETTER
June 24, 2020      [place] [place]     [facility] [id number]        Dear Wicho,     A refill of Folic acid has been requested by your pharmacy.  We noticed that it has been greater than 12 months since your last comprehensive visit and labs with Dr. Gomes.  A limited 90 day supply has been sent to your pharmacy at this time.    Additional refills require a medication management appointment.  Your health is very important to us.  Please call the clinic at [phone] to schedule your appointment.    Thank you,    The Refill Nurse  [facility]

## 2020-07-09 DIAGNOSIS — M06.9 RHEUMATOID ARTHRITIS, INVOLVING UNSPECIFIED SITE, UNSPECIFIED RHEUMATOID FACTOR PRESENCE: ICD-10-CM

## 2020-07-14 RX ORDER — METHOTREXATE 2.5 MG/1
TABLET ORAL
Qty: 72 TABLET | Refills: 0 | Status: SHIPPED | OUTPATIENT
Start: 2020-07-14 | End: 2020-08-14

## 2020-07-14 NOTE — TELEPHONE ENCOUNTER
After the patient's full name and date of birth was verified, the patient was notified of the below information.    Patient wanted to schedule on his own.  Marizol Win LPN on 7/14/2020 at 1:51 PM

## 2020-07-14 NOTE — TELEPHONE ENCOUNTER
[facility] sent Rx request for the following:   methotrexate sodium 2.5 MG TABS   Sig: TAKE 6 TABLETS BY MOUTH ONCE A WEEK     Last Prescription Date:   04/15/2020  Last Fill Qty/Refills:         72, R-0    Last Office Visit:              06/04/2019   Future Office visit:           none    Routing refill request to provider for review/approval because:    -Drug not on the [facility], [facility] or [facility] refill protocol or controlled substance      Brandee Machado RN  ....................  7/14/2020   12:50 PM

## 2020-08-14 ENCOUNTER — OFFICE VISIT (OUTPATIENT)
Dept: INTERNAL MEDICINE | Facility: OTHER | Age: 60
End: 2020-08-14
Attending: INTERNAL MEDICINE
Payer: COMMERCIAL

## 2020-08-14 ENCOUNTER — HOSPITAL ENCOUNTER (OUTPATIENT)
Dept: GENERAL RADIOLOGY | Facility: OTHER | Age: 60
End: 2020-08-14
Attending: INTERNAL MEDICINE
Payer: COMMERCIAL

## 2020-08-14 VITALS
HEART RATE: 80 BPM | BODY MASS INDEX: 29.53 KG/M2 | SYSTOLIC BLOOD PRESSURE: 120 MMHG | HEIGHT: 72 IN | WEIGHT: 218 LBS | TEMPERATURE: 98.5 F | DIASTOLIC BLOOD PRESSURE: 82 MMHG | RESPIRATION RATE: 16 BRPM

## 2020-08-14 DIAGNOSIS — R05.9 COUGH: ICD-10-CM

## 2020-08-14 DIAGNOSIS — R97.20 ELEVATED PROSTATE SPECIFIC ANTIGEN (PSA): ICD-10-CM

## 2020-08-14 DIAGNOSIS — N52.9 ERECTILE DYSFUNCTION, UNSPECIFIED ERECTILE DYSFUNCTION TYPE: ICD-10-CM

## 2020-08-14 DIAGNOSIS — E78.5 HYPERLIPIDEMIA, UNSPECIFIED HYPERLIPIDEMIA TYPE: Primary | ICD-10-CM

## 2020-08-14 DIAGNOSIS — I10 ESSENTIAL HYPERTENSION: ICD-10-CM

## 2020-08-14 DIAGNOSIS — K21.9 GASTROESOPHAGEAL REFLUX DISEASE WITHOUT ESOPHAGITIS: ICD-10-CM

## 2020-08-14 DIAGNOSIS — M06.9 RHEUMATOID ARTHRITIS, INVOLVING UNSPECIFIED SITE, UNSPECIFIED RHEUMATOID FACTOR PRESENCE: ICD-10-CM

## 2020-08-14 LAB
ALBUMIN SERPL-MCNC: 4.2 G/DL (ref 3.5–5.7)
ALP SERPL-CCNC: 68 U/L (ref 34–104)
ALT SERPL W P-5'-P-CCNC: 21 U/L (ref 7–52)
ANION GAP SERPL CALCULATED.3IONS-SCNC: 4 MMOL/L (ref 3–14)
AST SERPL W P-5'-P-CCNC: 21 U/L (ref 13–39)
BILIRUB SERPL-MCNC: 0.6 MG/DL (ref 0.3–1)
BUN SERPL-MCNC: 10 MG/DL (ref 7–25)
CALCIUM SERPL-MCNC: 9.1 MG/DL (ref 8.6–10.3)
CHLORIDE SERPL-SCNC: 92 MMOL/L (ref 98–107)
CHOLEST SERPL-MCNC: 149 MG/DL
CO2 SERPL-SCNC: 29 MMOL/L (ref 21–31)
CREAT SERPL-MCNC: 1.24 MG/DL (ref 0.7–1.3)
ERYTHROCYTE [DISTWIDTH] IN BLOOD BY AUTOMATED COUNT: 13.3 % (ref 10–15)
GFR SERPL CREATININE-BSD FRML MDRD: 59 ML/MIN/{1.73_M2}
GLUCOSE SERPL-MCNC: 116 MG/DL (ref 70–105)
HCT VFR BLD AUTO: 41.7 % (ref 40–53)
HDLC SERPL-MCNC: 39 MG/DL (ref 23–92)
HGB BLD-MCNC: 14.5 G/DL (ref 13.3–17.7)
LDLC SERPL CALC-MCNC: 73 MG/DL
MCH RBC QN AUTO: 33 PG (ref 26.5–33)
MCHC RBC AUTO-ENTMCNC: 34.8 G/DL (ref 31.5–36.5)
MCV RBC AUTO: 95 FL (ref 78–100)
NONHDLC SERPL-MCNC: 110 MG/DL
PLATELET # BLD AUTO: 210 10E9/L (ref 150–450)
POTASSIUM SERPL-SCNC: 4.3 MMOL/L (ref 3.5–5.1)
PROT SERPL-MCNC: 7.2 G/DL (ref 6.4–8.9)
PSA SERPL-MCNC: 5.43 NG/ML
RBC # BLD AUTO: 4.4 10E12/L (ref 4.4–5.9)
SODIUM SERPL-SCNC: 125 MMOL/L (ref 134–144)
TRIGL SERPL-MCNC: 184 MG/DL
WBC # BLD AUTO: 7.8 10E9/L (ref 4–11)

## 2020-08-14 PROCEDURE — 99215 OFFICE O/P EST HI 40 MIN: CPT | Performed by: INTERNAL MEDICINE

## 2020-08-14 PROCEDURE — G0463 HOSPITAL OUTPT CLINIC VISIT: HCPCS

## 2020-08-14 PROCEDURE — 84153 ASSAY OF PSA TOTAL: CPT | Mod: ZL | Performed by: INTERNAL MEDICINE

## 2020-08-14 PROCEDURE — 36415 COLL VENOUS BLD VENIPUNCTURE: CPT | Mod: ZL | Performed by: INTERNAL MEDICINE

## 2020-08-14 PROCEDURE — 80053 COMPREHEN METABOLIC PANEL: CPT | Mod: ZL | Performed by: INTERNAL MEDICINE

## 2020-08-14 PROCEDURE — G0463 HOSPITAL OUTPT CLINIC VISIT: HCPCS | Mod: 25

## 2020-08-14 PROCEDURE — 90732 PPSV23 VACC 2 YRS+ SUBQ/IM: CPT

## 2020-08-14 PROCEDURE — 71046 X-RAY EXAM CHEST 2 VIEWS: CPT

## 2020-08-14 PROCEDURE — G0009 ADMIN PNEUMOCOCCAL VACCINE: HCPCS

## 2020-08-14 PROCEDURE — 80061 LIPID PANEL: CPT | Mod: ZL | Performed by: INTERNAL MEDICINE

## 2020-08-14 PROCEDURE — 85027 COMPLETE CBC AUTOMATED: CPT | Mod: ZL | Performed by: INTERNAL MEDICINE

## 2020-08-14 RX ORDER — FOLIC ACID 1 MG/1
1000 TABLET ORAL DAILY
Qty: 90 TABLET | Refills: 3 | Status: SHIPPED | OUTPATIENT
Start: 2020-08-14 | End: 2021-09-27

## 2020-08-14 RX ORDER — METHOTREXATE 2.5 MG/1
TABLET ORAL
Qty: 72 TABLET | Refills: 3 | Status: SHIPPED | OUTPATIENT
Start: 2020-08-14 | End: 2021-09-07

## 2020-08-14 RX ORDER — LISINOPRIL AND HYDROCHLOROTHIAZIDE 20; 25 MG/1; MG/1
1 TABLET ORAL DAILY
Qty: 90 TABLET | Refills: 3 | Status: SHIPPED | OUTPATIENT
Start: 2020-08-14 | End: 2021-09-22

## 2020-08-14 RX ORDER — SILDENAFIL 100 MG/1
100 TABLET, FILM COATED ORAL DAILY PRN
Qty: 10 TABLET | Refills: 5 | Status: SHIPPED | OUTPATIENT
Start: 2020-08-14 | End: 2021-09-22

## 2020-08-14 ASSESSMENT — ENCOUNTER SYMPTOMS
AGITATION: 0
BRUISES/BLEEDS EASILY: 0
FREQUENCY: 0
CHEST TIGHTNESS: 0
ADENOPATHY: 0
HEADACHES: 0
HALLUCINATIONS: 0
UNEXPECTED WEIGHT CHANGE: 0
PALPITATIONS: 0
SORE THROAT: 0
EYE REDNESS: 0
FLANK PAIN: 0
APPETITE CHANGE: 0
DIZZINESS: 0
SHORTNESS OF BREATH: 0
DIFFICULTY URINATING: 0
SINUS PAIN: 0
WHEEZING: 0
DYSURIA: 0
FATIGUE: 0
TREMORS: 0
NAUSEA: 0
ABDOMINAL PAIN: 0
COUGH: 1
DIAPHORESIS: 0
BACK PAIN: 0
ABDOMINAL DISTENTION: 0
CHILLS: 0
NECK PAIN: 0
VOMITING: 0
FEVER: 0
DIARRHEA: 0
BLOOD IN STOOL: 0
SPEECH DIFFICULTY: 0
ARTHRALGIAS: 1
NERVOUS/ANXIOUS: 0
WEAKNESS: 0
NUMBNESS: 0
HEMATURIA: 0
LIGHT-HEADEDNESS: 0
WOUND: 0
VOICE CHANGE: 0
TROUBLE SWALLOWING: 0
SINUS PRESSURE: 0
CONFUSION: 0
NECK STIFFNESS: 0
SLEEP DISTURBANCE: 0
CONSTIPATION: 0
SEIZURES: 0
EYE PAIN: 0
RHINORRHEA: 0
JOINT SWELLING: 0

## 2020-08-14 ASSESSMENT — PAIN SCALES - GENERAL: PAINLEVEL: SEVERE PAIN (7)

## 2020-08-14 ASSESSMENT — MIFFLIN-ST. JEOR: SCORE: 1831.84

## 2020-08-14 NOTE — PROGRESS NOTES
Chief Complaint:  This patient is here for a comprehensive review of their multiple medical problems, renewal of medications and update on necessary health maintenance issues.      HPI: This patient is here today for complete evaluation and review of his chronic medical problems.  He has a history of rheumatoid arthritis.  He is on therapy with 15 mg of methotrexate weekly as well as folic acid daily.  This seems to be doing quite well for his rheumatoid arthritis and is quite happy with where he is at.  He does not feel that he needs anything further as far as treatment.    He has a history of hypertension.  He is on single drug therapy for control of his blood pressure.  He has a history of hyperlipidemia currently not treated.  He does not have any known vascular disease.  He has a history of some reflux disease and takes omeprazole on a daily basis.  He also has a history of erectile dysfunction, we started him on Viagra last year which is effective for him.  He does have a history of an elevated PSA and needs a recheck on this.    Just over the last week or so he has had somewhat of a sharp right upper chest pain.  He has been battling a little bit of a cough over the last month or so.  No fever with this.  No shortness of breath.    Medications are reconciled.  Past medical history, past surgical history, family history and social histories are reviewed and updated.  He is due for a Pneumovax which we will give him today.  I encouraged him to look into getting the Shingrix vaccine.  Colonoscopy was done last year, he had a tubular adenoma.  He will need a follow-up in 4 years.    Past Medical History:   Diagnosis Date     Equivocal stress test     Cardiolyte     Essential (primary) hypertension     No Comments Provided     Gastro-esophageal reflux disease without esophagitis     No Comments Provided     Hyperlipidemia     No Comments Provided     Male erectile dysfunction     7/2/2014     Rheumatoid arthritis  (H)     No Comments Provided     Tubular adenoma        Past Surgical History:   Procedure Laterality Date     ARTHROSCOPY KNEE Left     No Comments Provided     ARTHROSCOPY SHOULDER Left     2011     ARTHROTOMY WRIST Left     Wrist surgery with fusion.     COLONOSCOPY  2009,Normal screening colonoscopy, follow up recommended in 10 years     COLONOSCOPY N/A 2019    tubular adenoma, 5 year follow up     ELBOW SURGERY Right     Right elbow surgery for bone spurs/chips     ESOPHAGOSCOPY, GASTROSCOPY, DUODENOSCOPY (EGD), COMBINED      2015,[place]     VASECTOMY      No Comments Provided       Current Outpatient Medications   Medication Sig Dispense Refill     aspirin (ASA) 81 MG tablet Take 1 tablet (81 mg) by mouth daily       folic acid (FOLVITE) 1 MG tablet Take 1 tablet by mouth once daily 90 tablet 0     lisinopril-hydrochlorothiazide (PRINZIDE/ZESTORETIC) 20-25 MG tablet Take 1 tablet by mouth daily 90 tablet 3     methotrexate sodium 2.5 MG TABS TAKE 6 TABLETS BY MOUTH ONCE A WEEK 72 tablet 0     nabumetone (RELAFEN) 750 MG tablet Take 1 tablet (750 mg) by mouth 2 times daily as needed for moderate pain 120 tablet 3     omeprazole (PRILOSEC) 20 MG DR capsule Take 1 capsule (20 mg) by mouth daily 90 capsule 3     sildenafil (VIAGRA) 100 MG tablet Take 1 tablet (100 mg) by mouth daily as needed 10 tablet 5       No Known Allergies    Family History   Problem Relation Age of Onset     Other - See Comments Father          of some type of aneurysm.     Hypertension Mother          from old age     Cancer Daughter         Cancer,Leukemia       Social History     Socioeconomic History     Marital status:      Spouse name: Not on file     Number of children: Not on file     Years of education: Not on file     Highest education level: Not on file   Occupational History     Not on file   Social Needs     Financial resource strain: Not on file     Food insecurity     Worry: Not on  file     Inability: Not on file     Transportation needs     Medical: Not on file     Non-medical: Not on file   Tobacco Use     Smoking status: Never Smoker     Smokeless tobacco: Never Used   Substance and Sexual Activity     Alcohol use: Yes     Alcohol/week: 5.0 standard drinks     Frequency: 2-3 times a week     Drinks per session: 1 or 2     Comment: Occasional     Drug use: No     Comment: Drug use: No     Sexual activity: Not Currently   Lifestyle     Physical activity     Days per week: Not on file     Minutes per session: Not on file     Stress: Not on file   Relationships     Social connections     Talks on phone: Not on file     Gets together: Not on file     Attends Anglican service: Not on file     Active member of club or organization: Not on file     Attends meetings of clubs or organizations: Not on file     Relationship status: Not on file     Intimate partner violence     Fear of current or ex partner: Not on file     Emotionally abused: Not on file     Physically abused: Not on file     Forced sexual activity: Not on file   Other Topics Concern     Parent/sibling w/ CABG, MI or angioplasty before 65F 55M? Not Asked   Social History Narrative    He worked as a  for [employer].  , lives in [place].  Works at [employer].       Review of Systems   Constitutional: Negative for appetite change, chills, diaphoresis, fatigue, fever and unexpected weight change.   HENT: Negative for ear pain, rhinorrhea, sinus pressure, sinus pain, sore throat, trouble swallowing and voice change.    Eyes: Negative for pain, redness and visual disturbance.   Respiratory: Positive for cough. Negative for chest tightness, shortness of breath and wheezing.    Cardiovascular: Positive for chest pain. Negative for palpitations and leg swelling.   Gastrointestinal: Negative for abdominal distention, abdominal pain, blood in stool, constipation, diarrhea, nausea and vomiting.   Endocrine: Negative  for cold intolerance and heat intolerance.   Genitourinary: Negative for difficulty urinating, dysuria, flank pain, frequency and hematuria.   Musculoskeletal: Positive for arthralgias. Negative for back pain, joint swelling, neck pain and neck stiffness.   Skin: Negative for rash and wound.   Allergic/Immunologic: Negative for immunocompromised state.   Neurological: Negative for dizziness, tremors, seizures, syncope, speech difficulty, weakness, light-headedness, numbness and headaches.   Hematological: Negative for adenopathy. Does not bruise/bleed easily.   Psychiatric/Behavioral: Negative for agitation, behavioral problems, confusion, hallucinations and sleep disturbance. The patient is not nervous/anxious.        Physical Exam  Vitals signs and nursing note reviewed.   Constitutional:       General: He is not in acute distress.     Appearance: He is well-developed. He is not diaphoretic.   HENT:      Head: Normocephalic.      Right Ear: Tympanic membrane, ear canal and external ear normal.      Left Ear: Tympanic membrane, ear canal and external ear normal.      Nose: Nose normal.      Mouth/Throat:      Pharynx: No oropharyngeal exudate.   Eyes:      Conjunctiva/sclera: Conjunctivae normal.      Pupils: Pupils are equal, round, and reactive to light.   Neck:      Musculoskeletal: Normal range of motion and neck supple.      Thyroid: No thyroid mass or thyromegaly.      Vascular: Normal carotid pulses. No carotid bruit or JVD.      Trachea: No tracheal deviation.   Cardiovascular:      Rate and Rhythm: Normal rate and regular rhythm.      Heart sounds: Normal heart sounds. No murmur. No friction rub. No gallop.    Pulmonary:      Effort: Pulmonary effort is normal. No respiratory distress.      Breath sounds: Normal breath sounds. No stridor. No decreased breath sounds, wheezing, rhonchi or rales.   Abdominal:      General: Bowel sounds are normal. There is no distension.      Palpations: Abdomen is soft.  There is no mass.      Tenderness: There is no abdominal tenderness. There is no guarding or rebound.      Hernia: No hernia is present.   Genitourinary:     Penis: Normal.       Scrotum/Testes: Normal.      Rectum: Normal.      Comments: Prostate was somewhat high riding and not completely palpated  Musculoskeletal: Normal range of motion.      Right lower leg: No edema.      Left lower leg: No edema.   Lymphadenopathy:      Cervical: No cervical adenopathy.   Skin:     General: Skin is warm and dry.      Findings: No rash.   Neurological:      Mental Status: He is alert and oriented to person, place, and time.      Cranial Nerves: No cranial nerve deficit.      Sensory: No sensory deficit.      Motor: No abnormal muscle tone.      Coordination: Coordination normal.      Deep Tendon Reflexes: Reflexes normal.         Assessment:      ICD-10-CM    1. Hyperlipidemia, unspecified hyperlipidemia type  E78.5 Comprehensive metabolic panel     Lipid Profile   2. Essential hypertension  I10    3. Rheumatoid arthritis, involving unspecified site, unspecified rheumatoid factor presence (H)  M06.9 CBC with platelets   4. Erectile dysfunction, unspecified erectile dysfunction type  N52.9    5. Cough  R05 XR Chest 2 Views   6. Elevated prostate specific antigen (PSA)  R97.20 Prostate Specific Antigen GH        Plan: Overall the patient appears to be doing well.  His exam today is unremarkable.  His chest x-ray is normal, he is reassured regarding the cough it should resolve without incident.  Notify if that is not the case.  Medications are all continued without change.  Complete lab drawn and pending, I will send him a letter with the results.  Pneumovax given today and I encouraged him to get the Shingrix vaccine.  Follow-up with me will be on an annual basis.

## 2020-08-14 NOTE — NURSING NOTE
Chief Complaint   Patient presents with     Physical       Initial /82 (BP Location: Right arm, Patient Position: Sitting, Cuff Size: Adult Large)   Pulse 80   Temp 98.5  F (36.9  C) (Tympanic)   Resp 16   Ht 1.829 m (6')   Wt 98.9 kg (218 lb)   BMI 29.57 kg/m   Estimated body mass index is 29.57 kg/m  as calculated from the following:    Height as of this encounter: 1.829 m (6').    Weight as of this encounter: 98.9 kg (218 lb).  Medication Reconciliation: complete    Kerri Moulton LPN

## 2020-08-14 NOTE — LETTER
August 17, 2020      Wicho Hawk   [address]  [facility] [address]        Dear Wicho,    Below are the results of your recent labs:    Results for orders placed or performed during the hospital encounter of 08/14/20   XR Chest 2 Views     Status: None    Narrative    PROCEDURE:  XR CHEST 2 VW    HISTORY:  Cough.     COMPARISON:  None.    FINDINGS:   The cardiac silhouette is normal in size. The pulmonary vasculature is  normal.  The lungs are clear. No pleural effusion or pneumothorax.      Impression    IMPRESSION:  No acute cardiopulmonary disease.      SANA SHI MD   Results for orders placed or performed in visit on 08/14/20   Prostate Specific Antigen GH     Status: Abnormal   Result Value Ref Range    Prostate Specific Antigen 5.431 (H) <3.100 ng/mL   CBC with platelets     Status: None   Result Value Ref Range    WBC 7.8 4.0 - 11.0 10e9/L    RBC Count 4.40 4.4 - 5.9 10e12/L    Hemoglobin 14.5 13.3 - 17.7 g/dL    Hematocrit 41.7 40.0 - 53.0 %    MCV 95 78 - 100 fl    MCH 33.0 26.5 - 33.0 pg    MCHC 34.8 31.5 - 36.5 g/dL    RDW 13.3 10.0 - 15.0 %    Platelet Count 210 150 - 450 10e9/L   Lipid Profile     Status: Abnormal   Result Value Ref Range    Cholesterol 149 <200 mg/dL    Triglycerides 184 (H) <150 mg/dL    HDL Cholesterol 39 23 - 92 mg/dL    LDL Cholesterol Calculated 73 <100 mg/dL    Non HDL Cholesterol 110 <130 mg/dL   Comprehensive metabolic panel     Status: Abnormal   Result Value Ref Range    Sodium 125 (L) 134 - 144 mmol/L    Potassium 4.3 3.5 - 5.1 mmol/L    Chloride 92 (L) 98 - 107 mmol/L    Carbon Dioxide 29 21 - 31 mmol/L    Anion Gap 4 3 - 14 mmol/L    Glucose 116 (H) 70 - 105 mg/dL    Urea Nitrogen 10 7 - 25 mg/dL    Creatinine 1.24 0.70 - 1.30 mg/dL    GFR Estimate 59 (L) >60 mL/min/[1.73_m2]    GFR Estimate If Black 72 >60 mL/min/[1.73_m2]    Calcium 9.1 8.6 - 10.3 mg/dL    Bilirubin Total 0.6 0.3 - 1.0 mg/dL    Albumin 4.2 3.5 - 5.7 g/dL    Protein Total 7.2 6.4 - 8.9 g/dL     Alkaline Phosphatase 68 34 - 104 U/L    ALT 21 7 - 52 U/L    AST 21 13 - 39 U/L        Your blood tests show a few abnormalities.  First of all, your sodium level is low.  This is probably in part due to your blood pressure medication.  In addition, your glucose or diabetes test is high.  Finally, your PSA or prostate cancer screening test is higher than it was last year.  I want you to plan to return in 3 months so that we can recheck all of these abnormalities.  If you have any questions in the interim, feel free to contact me.    Sincerely,        Leonardo Gomes MD  Internal Medicine  [facility]

## 2020-11-27 ENCOUNTER — ALLIED HEALTH/NURSE VISIT (OUTPATIENT)
Dept: FAMILY MEDICINE | Facility: OTHER | Age: 60
End: 2020-11-27
Attending: FAMILY MEDICINE
Payer: COMMERCIAL

## 2020-11-27 DIAGNOSIS — G44.209 TENSION-TYPE HEADACHE, NOT INTRACTABLE, UNSPECIFIED CHRONICITY PATTERN: ICD-10-CM

## 2020-11-27 DIAGNOSIS — R05.9 COUGH: Primary | ICD-10-CM

## 2020-11-27 DIAGNOSIS — Z20.822 EXPOSURE TO COVID-19 VIRUS: ICD-10-CM

## 2020-11-27 PROCEDURE — U0003 INFECTIOUS AGENT DETECTION BY NUCLEIC ACID (DNA OR RNA); SEVERE ACUTE RESPIRATORY SYNDROME CORONAVIRUS 2 (SARS-COV-2) (CORONAVIRUS DISEASE [COVID-19]), AMPLIFIED PROBE TECHNIQUE, MAKING USE OF HIGH THROUGHPUT TECHNOLOGIES AS DESCRIBED BY CMS-2020-01-R: HCPCS | Mod: ZL | Performed by: FAMILY MEDICINE

## 2020-11-27 PROCEDURE — C9803 HOPD COVID-19 SPEC COLLECT: HCPCS

## 2020-11-27 PROCEDURE — 99207 PR NO CHARGE NURSE ONLY: CPT

## 2020-11-27 NOTE — NURSING NOTE
Patient swabbed for COVID-19 testing cough, headache, exposure.  Cheli Melendez LPN on 11/27/2020 at 4:47 PM

## 2020-11-30 ENCOUNTER — TELEPHONE (OUTPATIENT)
Dept: INTERNAL MEDICINE | Facility: OTHER | Age: 60
End: 2020-11-30

## 2020-11-30 LAB
SARS-COV-2 RNA SPEC QL NAA+PROBE: NOT DETECTED
SPECIMEN SOURCE: NORMAL

## 2020-11-30 NOTE — TELEPHONE ENCOUNTER
Reason for call: Request for results.    Name of test or procedure: covid-19    Date of test or procedure: 11/27    Location of test or procedure: [facility]    Preferred method for responding to this message: Telephone Call    Phone number patient can be reached at: Home number on file [phone] (home)    If we can't reach you directly, may we leave a detailed response at the number you provided?Yes

## 2021-01-03 ENCOUNTER — HEALTH MAINTENANCE LETTER (OUTPATIENT)
Age: 61
End: 2021-01-03

## 2021-08-12 DIAGNOSIS — K21.9 GASTROESOPHAGEAL REFLUX DISEASE WITHOUT ESOPHAGITIS: ICD-10-CM

## 2021-08-16 NOTE — TELEPHONE ENCOUNTER
Prescription approved per [facility] Refill Protocol.  LOV: 8/14/2020  Patient is due for annual review  my chart message sent.    Lexi Nunez RN on 8/16/2021 at 2:49 PM

## 2021-08-31 DIAGNOSIS — M06.9 RHEUMATOID ARTHRITIS INVOLVING MULTIPLE SITES, UNSPECIFIED WHETHER RHEUMATOID FACTOR PRESENT (H): Primary | ICD-10-CM

## 2021-08-31 DIAGNOSIS — M06.9 RHEUMATOID ARTHRITIS, INVOLVING UNSPECIFIED SITE, UNSPECIFIED WHETHER RHEUMATOID FACTOR PRESENT (H): ICD-10-CM

## 2021-08-31 DIAGNOSIS — M06.9 RHEUMATOID ARTHRITIS (H): ICD-10-CM

## 2021-08-31 DIAGNOSIS — M06.9 RHEUMATOID ARTHRITIS, RHEUMATOID FACTOR STATUS UNKNOWN (H): ICD-10-CM

## 2021-08-31 NOTE — LETTER
[facility]  [address]  [place] [address] [phone]      September 3, 2021    Wicho Hawk  [address]   [place] [address]    Dear Wicho Hawk,     We recently received a refill request for methotrexate sodium 2.5 mg tabs prescribed by Leonardo Gomes.      We have refilled your medication for one time only.    In order to get any additional refills, call our scheduling center at [phone] to request a visit with your primary care provider for an annual physical.        Thank you,     Refill Nurse

## 2021-09-03 RX ORDER — METHOTREXATE 2.5 MG/1
TABLET ORAL
OUTPATIENT
Start: 2021-09-03

## 2021-09-03 NOTE — TELEPHONE ENCOUNTER
Requested Prescriptions   Pending Prescriptions Disp Refills     methotrexate sodium 2.5 MG TABS [Pharmacy Med Name: Methotrexate Sodium 2.5 MG Oral Tablet] 72 tablet 0     Sig: TAKE 6 TABLETS BY MOUTH ONCE A WEEK     Last Written Prescription Date:  8/14/21  Last Fill Quantity: 72,   # refills: 3  Last Office Visit: 8/14/20 Mireya  Future Office visit: none      Routing refill request to provider for review/approval because:  Unable to fill prescription refills per RN Med Refill Policy.  Letter sent to patient re: need to schedule annual exam.  Brenda J. Goodell, RN on 9/3/2021 at 9:35 AM

## 2021-09-07 ENCOUNTER — TELEPHONE (OUTPATIENT)
Dept: INTERNAL MEDICINE | Facility: OTHER | Age: 61
End: 2021-09-07

## 2021-09-07 DIAGNOSIS — M06.9 RHEUMATOID ARTHRITIS, INVOLVING UNSPECIFIED SITE, UNSPECIFIED WHETHER RHEUMATOID FACTOR PRESENT (H): Primary | ICD-10-CM

## 2021-09-07 RX ORDER — METHOTREXATE 2.5 MG/1
TABLET ORAL
Qty: 72 TABLET | Refills: 3 | Status: SHIPPED | OUTPATIENT
Start: 2021-09-07 | End: 2022-08-01

## 2021-09-07 RX ORDER — METHOTREXATE 2.5 MG/1
TABLET ORAL
Qty: 72 TABLET | Refills: 0 | Status: CANCELLED | OUTPATIENT
Start: 2021-09-07

## 2021-09-07 NOTE — TELEPHONE ENCOUNTER
Pt needs to be seen to get his medication refilled, he will be out by Friday. Pt needs to be worked in.

## 2021-09-07 NOTE — TELEPHONE ENCOUNTER
Called and spoke with patient, patient has an appointment on 9/27 but needs a refill sooner (by Friday) of methotrexate. He is wondering if you will either see him sooner so he doesn't run out or if you would be willing to send a refill in.    Gonzalo Reese, LPN on 9/7/2021 at 4:07 PM

## 2021-09-07 NOTE — TELEPHONE ENCOUNTER
Patient is working today,  Wondering if someone can call him after 4pm.   He did schedule an appointment for 09/27/2021 but he will be out of med before then.    Please call patient back thank you   Halle Sanchez on 9/7/2021 at 3:02 PM

## 2021-09-20 DIAGNOSIS — I10 ESSENTIAL HYPERTENSION: ICD-10-CM

## 2021-09-20 DIAGNOSIS — N52.9 ERECTILE DYSFUNCTION, UNSPECIFIED ERECTILE DYSFUNCTION TYPE: ICD-10-CM

## 2021-09-22 RX ORDER — SILDENAFIL 100 MG/1
TABLET, FILM COATED ORAL
Qty: 6 TABLET | Refills: 11 | Status: SHIPPED | OUTPATIENT
Start: 2021-09-22 | End: 2023-06-09

## 2021-09-22 RX ORDER — LISINOPRIL AND HYDROCHLOROTHIAZIDE 20; 25 MG/1; MG/1
TABLET ORAL
Qty: 90 TABLET | Refills: 3 | Status: SHIPPED | OUTPATIENT
Start: 2021-09-22 | End: 2022-09-19

## 2021-09-22 NOTE — TELEPHONE ENCOUNTER
lisinopril-hydrochlorothiazide (ZESTORETIC) 20-25 MG tablet     Sig: Take 1 tablet by mouth once daily           Last Written Prescription Date:  8/14/20  Last Fill Quantity: 90,   # refills: 3  Last Office Visit: 8/14/20  Future Office visit:    Next 5 appointments (look out 90 days)    Sep 27, 2021  4:00 PM  PHYSICAL with Leonardo Gomes MD  [facility] [address] [address]  [phone]           Routing refill request to provider for review/approval because:  Drug not on the [facility], [facility] or [facility] refill protocol or controlled substance      sildenafil (VIAGRA) 100 MG tablet     Sig: TAKE 1 TABLET BY MOUTH ONCE DAILY AS NEEDED      Last Written Prescription Date:  8/14/20  Last Fill Quantity: 10,   # refills: 5  Last Office Visit: 8/14/20  Future Office visit:    Next 5 appointments (look out 90 days)    Sep 27, 2021  4:00 PM  PHYSICAL with Leonardo Gomes MD  [facility] ([facility] ) [address] [facility] [address] [place] [address]  [phone]         Routing refill request to provider for review/approval because:  Drug not on the [facility], [facility] or  Health refill protocol or controlled substance  Georgina Chavira RN on 9/22/2021 at 8:08 AM

## 2021-09-27 ENCOUNTER — OFFICE VISIT (OUTPATIENT)
Dept: INTERNAL MEDICINE | Facility: OTHER | Age: 61
End: 2021-09-27
Attending: INTERNAL MEDICINE
Payer: COMMERCIAL

## 2021-09-27 VITALS
WEIGHT: 211 LBS | SYSTOLIC BLOOD PRESSURE: 98 MMHG | TEMPERATURE: 98.4 F | OXYGEN SATURATION: 97 % | DIASTOLIC BLOOD PRESSURE: 74 MMHG | HEART RATE: 86 BPM | HEIGHT: 72 IN | RESPIRATION RATE: 18 BRPM | BODY MASS INDEX: 28.58 KG/M2

## 2021-09-27 DIAGNOSIS — R97.20 ELEVATED PROSTATE SPECIFIC ANTIGEN (PSA): ICD-10-CM

## 2021-09-27 DIAGNOSIS — N52.9 ERECTILE DYSFUNCTION, UNSPECIFIED ERECTILE DYSFUNCTION TYPE: ICD-10-CM

## 2021-09-27 DIAGNOSIS — K21.9 GASTROESOPHAGEAL REFLUX DISEASE WITHOUT ESOPHAGITIS: ICD-10-CM

## 2021-09-27 DIAGNOSIS — R73.9 HYPERGLYCEMIA: ICD-10-CM

## 2021-09-27 DIAGNOSIS — L29.0 RECTAL ITCHING: ICD-10-CM

## 2021-09-27 DIAGNOSIS — I10 ESSENTIAL HYPERTENSION: ICD-10-CM

## 2021-09-27 DIAGNOSIS — E78.5 HYPERLIPIDEMIA, UNSPECIFIED HYPERLIPIDEMIA TYPE: ICD-10-CM

## 2021-09-27 DIAGNOSIS — M06.9 RHEUMATOID ARTHRITIS, INVOLVING UNSPECIFIED SITE, UNSPECIFIED WHETHER RHEUMATOID FACTOR PRESENT (H): Primary | ICD-10-CM

## 2021-09-27 LAB
ALBUMIN SERPL-MCNC: 4.2 G/DL (ref 3.5–5.7)
ALP SERPL-CCNC: 63 U/L (ref 34–104)
ALT SERPL W P-5'-P-CCNC: 27 U/L (ref 7–52)
ANION GAP SERPL CALCULATED.3IONS-SCNC: 4 MMOL/L (ref 3–14)
AST SERPL W P-5'-P-CCNC: 26 U/L (ref 13–39)
BILIRUB SERPL-MCNC: 1.1 MG/DL (ref 0.3–1)
BUN SERPL-MCNC: 16 MG/DL (ref 7–25)
CALCIUM SERPL-MCNC: 9.2 MG/DL (ref 8.6–10.3)
CHLORIDE BLD-SCNC: 95 MMOL/L (ref 98–107)
CHOLEST SERPL-MCNC: 152 MG/DL
CO2 SERPL-SCNC: 30 MMOL/L (ref 21–31)
CREAT SERPL-MCNC: 1.33 MG/DL (ref 0.7–1.3)
ERYTHROCYTE [DISTWIDTH] IN BLOOD BY AUTOMATED COUNT: 13.1 % (ref 10–15)
FASTING STATUS PATIENT QL REPORTED: YES
GFR SERPL CREATININE-BSD FRML MDRD: 56 ML/MIN/1.73M2
GLUCOSE BLD-MCNC: 101 MG/DL (ref 70–105)
HBA1C MFR BLD: 5.4 % (ref 4–6.2)
HCT VFR BLD AUTO: 42 % (ref 40–53)
HDLC SERPL-MCNC: 45 MG/DL (ref 23–92)
HGB BLD-MCNC: 15.1 G/DL (ref 13.3–17.7)
LDLC SERPL CALC-MCNC: 75 MG/DL
MCH RBC QN AUTO: 34 PG (ref 26.5–33)
MCHC RBC AUTO-ENTMCNC: 36 G/DL (ref 31.5–36.5)
MCV RBC AUTO: 95 FL (ref 78–100)
NONHDLC SERPL-MCNC: 107 MG/DL
PLATELET # BLD AUTO: 197 10E3/UL (ref 150–450)
POTASSIUM BLD-SCNC: 4 MMOL/L (ref 3.5–5.1)
PROT SERPL-MCNC: 6.8 G/DL (ref 6.4–8.9)
PSA SERPL-MCNC: 4.84 UG/L (ref 0–4)
RBC # BLD AUTO: 4.44 10E6/UL (ref 4.4–5.9)
SODIUM SERPL-SCNC: 129 MMOL/L (ref 134–144)
TRIGL SERPL-MCNC: 158 MG/DL
WBC # BLD AUTO: 4.9 10E3/UL (ref 4–11)

## 2021-09-27 PROCEDURE — G0463 HOSPITAL OUTPT CLINIC VISIT: HCPCS

## 2021-09-27 PROCEDURE — 83036 HEMOGLOBIN GLYCOSYLATED A1C: CPT | Mod: ZL | Performed by: INTERNAL MEDICINE

## 2021-09-27 PROCEDURE — 82040 ASSAY OF SERUM ALBUMIN: CPT | Mod: ZL | Performed by: INTERNAL MEDICINE

## 2021-09-27 PROCEDURE — 85027 COMPLETE CBC AUTOMATED: CPT | Mod: ZL | Performed by: INTERNAL MEDICINE

## 2021-09-27 PROCEDURE — 99396 PREV VISIT EST AGE 40-64: CPT | Performed by: INTERNAL MEDICINE

## 2021-09-27 PROCEDURE — 36415 COLL VENOUS BLD VENIPUNCTURE: CPT | Mod: ZL | Performed by: INTERNAL MEDICINE

## 2021-09-27 PROCEDURE — 84153 ASSAY OF PSA TOTAL: CPT | Mod: ZL | Performed by: INTERNAL MEDICINE

## 2021-09-27 PROCEDURE — 80061 LIPID PANEL: CPT | Mod: ZL | Performed by: INTERNAL MEDICINE

## 2021-09-27 RX ORDER — FOLIC ACID 1 MG/1
1000 TABLET ORAL DAILY
Qty: 90 TABLET | Refills: 3 | Status: SHIPPED | OUTPATIENT
Start: 2021-09-27 | End: 2022-10-06

## 2021-09-27 RX ORDER — HYDROCORTISONE 25 MG/G
CREAM TOPICAL 2 TIMES DAILY PRN
Qty: 30 G | Refills: 3 | Status: SHIPPED | OUTPATIENT
Start: 2021-09-27 | End: 2022-10-06

## 2021-09-27 ASSESSMENT — ENCOUNTER SYMPTOMS
DIFFICULTY URINATING: 0
FEVER: 0
ADENOPATHY: 0
BRUISES/BLEEDS EASILY: 0
WHEEZING: 0
FLANK PAIN: 0
EYE PAIN: 0
NECK STIFFNESS: 0
RHINORRHEA: 0
VOICE CHANGE: 0
ARTHRALGIAS: 1
CHILLS: 0
SLEEP DISTURBANCE: 0
CHEST TIGHTNESS: 0
NUMBNESS: 0
EYE REDNESS: 0
RECTAL PAIN: 1
FREQUENCY: 0
DIAPHORESIS: 0
WOUND: 0
CONFUSION: 0
TREMORS: 0
HEMATURIA: 0
SINUS PAIN: 0
SINUS PRESSURE: 0
ABDOMINAL DISTENTION: 0
WEAKNESS: 0
BACK PAIN: 0
SORE THROAT: 0
TROUBLE SWALLOWING: 0
CONSTIPATION: 0
LIGHT-HEADEDNESS: 0
ABDOMINAL PAIN: 0
DIZZINESS: 0
UNEXPECTED WEIGHT CHANGE: 0
HALLUCINATIONS: 0
SPEECH DIFFICULTY: 0
HEADACHES: 0
NERVOUS/ANXIOUS: 0
AGITATION: 0
DYSURIA: 0
JOINT SWELLING: 0
SHORTNESS OF BREATH: 0
APPETITE CHANGE: 0
DIARRHEA: 0
NAUSEA: 0
VOMITING: 0
BLOOD IN STOOL: 0
PALPITATIONS: 0
SEIZURES: 0
NECK PAIN: 0
COUGH: 0
FATIGUE: 0

## 2021-09-27 ASSESSMENT — PAIN SCALES - GENERAL: PAINLEVEL: NO PAIN (0)

## 2021-09-27 ASSESSMENT — MIFFLIN-ST. JEOR: SCORE: 1790.09

## 2021-09-27 NOTE — PROGRESS NOTES
Chief Complaint:  This patient is here for a comprehensive review of their multiple medical problems, renewal of medications and update on necessary health maintenance issues.      HPI: He is in today for a physical.  He has rheumatoid arthritis and is maintained on methotrexate with folic acid.  He is doing very well with that.  He has Relafen to take as needed but he does not even think that he needs to take that.  When he does take it regularly, it does cause a fair amount of reflux symptoms.  He does take omeprazole on a regular basis for his reflux.    A year ago the patient was in for a physical and was found to have hyponatremia, hyperglycemia and an elevated PSA.  I had asked him to come back in 3 months that he did not do that.  We obviously need to follow-up on those things today.  His hyponatremia was likely related to his antihypertensive therapy.    His only complaint today is that he has some hemorrhoids and they are quite itchy.    Medications are reconciled.  Past medical history, past surgical history, family history and social histories are reviewed and updated.  He will be getting his third Covid booster as he is immunosuppressed.  I also encouraged him to look into getting the Shingrix vaccine.  Colonoscopy will be due in 3 years.        Past Medical History:   Diagnosis Date     Equivocal stress test     Cardiolyte     Essential (primary) hypertension     No Comments Provided     Gastro-esophageal reflux disease without esophagitis     No Comments Provided     Hyperlipidemia     No Comments Provided     Male erectile dysfunction     7/2/2014     Rheumatoid arthritis (H)     No Comments Provided     Tubular adenoma        Past Surgical History:   Procedure Laterality Date     ARTHROSCOPY KNEE Left     No Comments Provided     ARTHROSCOPY SHOULDER Left     08/2011     ARTHROTOMY WRIST Left     Wrist surgery with fusion.     COLONOSCOPY  05/01/2009 5/01/2009,Normal screening colonoscopy, follow  up recommended in 10 years     COLONOSCOPY N/A 2019    tubular adenoma, 5 year follow up     ELBOW SURGERY Right     Right elbow surgery for bone spurs/chips     ESOPHAGOSCOPY, GASTROSCOPY, DUODENOSCOPY (EGD), COMBINED      2015,[facility]     VASECTOMY      No Comments Provided       Current Outpatient Medications   Medication Sig Dispense Refill     aspirin (ASA) 81 MG tablet Take 1 tablet (81 mg) by mouth daily       folic acid (FOLVITE) 1 MG tablet Take 1 tablet (1,000 mcg) by mouth daily 90 tablet 3     lisinopril-hydrochlorothiazide (ZESTORETIC) 20-25 MG tablet Take 1 tablet by mouth once daily 90 tablet 3     methotrexate sodium 2.5 MG TABS TAKE 6 TABLETS BY MOUTH ONCE A WEEK 72 tablet 3     nabumetone (RELAFEN) 750 MG tablet Take 1 tablet (750 mg) by mouth 2 times daily as needed for moderate pain 120 tablet 3     omeprazole (PRILOSEC) 20 MG DR capsule Take 1 capsule by mouth once daily 90 capsule 0     sildenafil (VIAGRA) 100 MG tablet TAKE 1 TABLET BY MOUTH ONCE DAILY AS NEEDED 6 tablet 11       No Known Allergies    Family History   Problem Relation Age of Onset     Other - See Comments Father          of some type of aneurysm.     Hypertension Mother          from old age     Cancer Daughter         Cancer,Leukemia       Social History     Socioeconomic History     Marital status:      Spouse name: Not on file     Number of children: Not on file     Years of education: Not on file     Highest education level: Not on file   Occupational History     Not on file   Tobacco Use     Smoking status: Never Smoker     Smokeless tobacco: Never Used   Substance and Sexual Activity     Alcohol use: Yes     Alcohol/week: 5.0 standard drinks     Comment: 1-2 times a week     Drug use: No     Comment: Drug use: No     Sexual activity: Not Currently   Other Topics Concern     Parent/sibling w/ CABG, MI or angioplasty before 65F 55M? Not Asked   Social History Narrative    He worked as a  for  [employer].  , lives in [place].  Works at [employer].     Social Determinants of Health     Financial Resource Strain:      Difficulty of Paying Living Expenses:    Food Insecurity:      Worried About Running Out of Food in the Last Year:      Ran Out of Food in the Last Year:    Transportation Needs:      Lack of Transportation (Medical):      Lack of Transportation (Non-Medical):    Physical Activity:      Days of Exercise per Week:      Minutes of Exercise per Session:    Stress:      Feeling of Stress :    Social Connections:      Frequency of Communication with Friends and Family:      Frequency of Social Gatherings with Friends and Family:      Attends Mandaen Services:      Active Member of Clubs or Organizations:      Attends Club or Organization Meetings:      Marital Status:    Intimate Partner Violence:      Fear of Current or Ex-Partner:      Emotionally Abused:      Physically Abused:      Sexually Abused:        Review of Systems   Constitutional: Negative for appetite change, chills, diaphoresis, fatigue, fever and unexpected weight change.   HENT: Negative for ear pain, rhinorrhea, sinus pressure, sinus pain, sore throat, trouble swallowing and voice change.    Eyes: Negative for pain, redness and visual disturbance.   Respiratory: Negative for cough, chest tightness, shortness of breath and wheezing.    Cardiovascular: Negative for chest pain, palpitations and leg swelling.   Gastrointestinal: Positive for rectal pain. Negative for abdominal distention, abdominal pain, blood in stool, constipation, diarrhea, nausea and vomiting.   Endocrine: Negative for cold intolerance and heat intolerance.   Genitourinary: Negative for difficulty urinating, dysuria, flank pain, frequency and hematuria.   Musculoskeletal: Positive for arthralgias. Negative for back pain, joint swelling, neck pain and neck stiffness.   Skin: Negative for rash and wound.   Allergic/Immunologic: Negative for  immunocompromised state.   Neurological: Negative for dizziness, tremors, seizures, syncope, speech difficulty, weakness, light-headedness, numbness and headaches.   Hematological: Negative for adenopathy. Does not bruise/bleed easily.   Psychiatric/Behavioral: Negative for agitation, behavioral problems, confusion, hallucinations and sleep disturbance. The patient is not nervous/anxious.        Physical Exam  Vitals and nursing note reviewed.   Constitutional:       General: He is not in acute distress.     Appearance: He is well-developed. He is not diaphoretic.   HENT:      Head: Normocephalic.      Right Ear: Tympanic membrane, ear canal and external ear normal.      Left Ear: Tympanic membrane, ear canal and external ear normal.      Nose: Nose normal.      Mouth/Throat:      Pharynx: No oropharyngeal exudate.   Eyes:      Conjunctiva/sclera: Conjunctivae normal.      Pupils: Pupils are equal, round, and reactive to light.   Neck:      Thyroid: No thyroid mass or thyromegaly.      Vascular: Normal carotid pulses. No carotid bruit or JVD.      Trachea: No tracheal deviation.   Cardiovascular:      Rate and Rhythm: Normal rate and regular rhythm.      Heart sounds: Normal heart sounds. No murmur heard.   No friction rub. No gallop.    Pulmonary:      Effort: Pulmonary effort is normal. No respiratory distress.      Breath sounds: Normal breath sounds. No stridor. No decreased breath sounds, wheezing, rhonchi or rales.   Abdominal:      General: Bowel sounds are normal. There is no distension.      Palpations: Abdomen is soft. There is no mass.      Tenderness: There is no abdominal tenderness. There is no guarding or rebound.      Hernia: No hernia is present.   Genitourinary:     Penis: Normal.       Testes: Normal.      Prostate: Enlarged.      Rectum: Normal. No tenderness, anal fissure, external hemorrhoid or internal hemorrhoid.      Comments: 3+ prostate, no nodularity  Musculoskeletal:         General:  Normal range of motion.      Cervical back: Normal range of motion and neck supple.      Right lower leg: No edema.      Left lower leg: No edema.   Lymphadenopathy:      Cervical: No cervical adenopathy.   Skin:     General: Skin is warm and dry.      Findings: No rash.   Neurological:      Mental Status: He is alert and oriented to person, place, and time.      Cranial Nerves: No cranial nerve deficit.      Sensory: No sensory deficit.      Motor: No abnormal muscle tone.      Coordination: Coordination normal.      Deep Tendon Reflexes: Reflexes normal.         Assessment:      ICD-10-CM    1. Rheumatoid arthritis, involving unspecified site, unspecified whether rheumatoid factor present (H)  M06.9 CBC W PLT No Diff     folic acid (FOLVITE) 1 MG tablet   2. Essential hypertension  I10    3. Hyperlipidemia, unspecified hyperlipidemia type  E78.5 Comprehensive metabolic panel     Lipid Panel   4. Gastroesophageal reflux disease without esophagitis  K21.9    5. Elevated prostate specific antigen (PSA)  R97.20 Prostate Specific Antigen   6. Erectile dysfunction, unspecified erectile dysfunction type  N52.9    7. Hyperglycemia  R73.9 Hemoglobin A1c   8. Rectal itching  L29.0         Plan: His exam today is unremarkable, he does not have hemorrhoids.  I think he just has some rectal itch.  I suggested we try some Anusol HC as well as medicated wipes.  He will let me know if not improving.  No change in medications for now.  Complete lab drawn and pending, I will send him a [employer] message with the results and any recommendations.  He will look into getting the Covid booster to be followed by the Shingrix vaccine later this year.  Assuming all goes well, follow-up annually.

## 2021-09-27 NOTE — LETTER
September 28, 2021      Wicho Hawk  [address]  [place] [address]        Dear Wicho,    Below are the results of your recent labs:    Results for orders placed or performed in visit on 09/27/21   Prostate Specific Antigen     Status: Abnormal   Result Value Ref Range    PSA Tumor Marker 4.84 (H) 0.00 - 4.00 ug/L   Hemoglobin A1c     Status: Normal   Result Value Ref Range    Hemoglobin A1C 5.4 4.0 - 6.2 %   CBC W PLT No Diff     Status: Abnormal   Result Value Ref Range    WBC Count 4.9 4.0 - 11.0 10e3/uL    RBC Count 4.44 4.40 - 5.90 10e6/uL    Hemoglobin 15.1 13.3 - 17.7 g/dL    Hematocrit 42.0 40.0 - 53.0 %    MCV 95 78 - 100 fL    MCH 34.0 (H) 26.5 - 33.0 pg    MCHC 36.0 31.5 - 36.5 g/dL    RDW 13.1 10.0 - 15.0 %    Platelet Count 197 150 - 450 10e3/uL   Lipid Panel     Status: Abnormal   Result Value Ref Range    Cholesterol 152 <200 mg/dL    Triglycerides 158 (H) <150 mg/dL    Direct Measure HDL 45 23 - 92 mg/dL    LDL Cholesterol Calculated 75 <=100 mg/dL    Non HDL Cholesterol 107 <130 mg/dL    Patient Fasting > 8hrs? Yes     Narrative    Cholesterol  Desirable:  <200 mg/dL    Triglycerides  Normal:  Less than 150 mg/dL  Borderline High:  150-199 mg/dL  High:  200-499 mg/dL  Very High:  Greater than or equal to 500 mg/dL    Direct Measure HDL  Female:  Greater than or equal to 50 mg/dL   Male:  Greater than or equal to 40 mg/dL    LDL Cholesterol  Desirable:  <100mg/dL  Above Desirable:  100-129 mg/dL   Borderline High:  130-159 mg/dL   High:  160-189 mg/dL   Very High:  >= 190 mg/dL    Non HDL Cholesterol  Desirable:  130 mg/dL  Above Desirable:  130-159 mg/dL  Borderline High:  160-189 mg/dL  High:  190-219 mg/dL  Very High:  Greater than or equal to 220 mg/dL   Comprehensive metabolic panel     Status: Abnormal   Result Value Ref Range    Sodium 129 (L) 134 - 144 mmol/L    Potassium 4.0 3.5 - 5.1 mmol/L    Chloride 95 (L) 98 - 107 mmol/L    Carbon Dioxide (CO2) 30 21 - 31 mmol/L    Anion Gap 4  3 - 14 mmol/L    Urea Nitrogen 16 7 - 25 mg/dL    Creatinine 1.33 (H) 0.70 - 1.30 mg/dL    Calcium 9.2 8.6 - 10.3 mg/dL    Glucose 101 70 - 105 mg/dL    Alkaline Phosphatase 63 34 - 104 U/L    AST 26 13 - 39 U/L    ALT 27 7 - 52 U/L    Protein Total 6.8 6.4 - 8.9 g/dL    Albumin 4.2 3.5 - 5.7 g/dL    Bilirubin Total 1.1 (H) 0.3 - 1.0 mg/dL    GFR Estimate 56 (L) >60 mL/min/1.73m2        Overall, your blood tests look acceptable.  Your PSA or prostate test is once again a little bit on the high side but remains stable.  Your sodium is once again somewhat low but it is improved from the last time we checked this.  I am satisfied with your results.  If you have any questions about any of your results, feel free to contact me.    Sincerely,        Leonardo Gomes MD  Internal Medicine  [facility]

## 2021-09-27 NOTE — NURSING NOTE
Patient comes I for yearly physical.  Edie Truong LPN ....................9/27/2021   4:08 PM  Chief Complaint   Patient presents with     Physical       Initial BP 98/74 (BP Location: Right arm, Patient Position: Sitting, Cuff Size: Adult Large)   Pulse 86   Temp 98.4  F (36.9  C) (Tympanic)   Resp 18   Ht 1.829 m (6')   Wt 95.7 kg (211 lb)   SpO2 97%   BMI 28.62 kg/m   Estimated body mass index is 28.62 kg/m  as calculated from the following:    Height as of this encounter: 1.829 m (6').    Weight as of this encounter: 95.7 kg (211 lb).  Medication Reconciliation: complete    Edie Truong LPN  FOOD SECURITY SCREENING QUESTIONS  Hunger Vital Signs:  Within the past 12 months we worried whether our food would run out before we got money to buy more. Never  Within the past 12 months the food we bought just didn't last and we didn't have money to get more. Never  Edie Truong LPN 9/27/2021 4:08 PM

## 2021-10-09 ENCOUNTER — HEALTH MAINTENANCE LETTER (OUTPATIENT)
Age: 61
End: 2021-10-09

## 2021-11-07 DIAGNOSIS — K21.9 GASTROESOPHAGEAL REFLUX DISEASE WITHOUT ESOPHAGITIS: ICD-10-CM

## 2021-11-08 NOTE — TELEPHONE ENCOUNTER
"[id number]  sent Rx request for the following:      Requested Prescriptions   Pending Prescriptions Disp Refills     omeprazole (PRILOSEC) 20 MG DR capsule [Pharmacy Med Name: Omeprazole 20 MG Oral Capsule Delayed Release] 90 capsule 0     Sig: Take 1 capsule by mouth once daily       PPI Protocol Passed - 11/7/2021  4:52 PM        Passed - Not on Clopidogrel (unless Pantoprazole ordered)        Passed - No diagnosis of osteoporosis on record        Passed - Recent (12 mo) or future (30 days) visit within the authorizing provider's specialty     Patient has had an office visit with the authorizing provider or a provider within the authorizing providers department within the previous 12 mos or has a future within next 30 days. See \"Patient Info\" tab in inbasket, or \"Choose Columns\" in Meds & Orders section of the refill encounter.          Passed - Medication is active on med list        Passed - Patient is age 18 or older     Last Prescription Date:   8/16/21  Last Fill Qty/Refills:         90, R-0  Last Office Visit:              9/27/21  Future Office visit:           None   Routing refill request to provider for review/approval because:  Unable to complete prescription refill per RN Medication Refill Policy. Melodie Baez RN .............. 11/8/2021  3:44 PM  "

## 2021-12-04 ENCOUNTER — APPOINTMENT (OUTPATIENT)
Dept: URGENT CARE | Facility: CLINIC | Age: 61
End: 2021-12-04
Payer: COMMERCIAL

## 2022-01-07 DIAGNOSIS — M06.9 RHEUMATOID ARTHRITIS (H): ICD-10-CM

## 2022-01-10 NOTE — TELEPHONE ENCOUNTER
Nabumetone (RELAFEN) 750 MG tablet  Last Written Prescription Date: 08/14/2020  Last Fill Quantity: 120,   # refills: 3  Last Office Visit: 09/27/2021  Future Office visit:       Routing refill request to provider for review/approval because:  Failed - No normal serum creatinine on file in past 12 months   Recent Labs   Lab Test 09/27/21  1635   CR 1.33*        Unable to complete prescription refill per RNMedication Refill Policy.................... Silke Stockton RN ....................  1/10/2022   3:25 PM

## 2022-02-15 DIAGNOSIS — K21.9 GASTROESOPHAGEAL REFLUX DISEASE WITHOUT ESOPHAGITIS: ICD-10-CM

## 2022-02-15 NOTE — TELEPHONE ENCOUNTER
" Disp Refills Start End LATASHA   omeprazole (PRILOSEC) 20 MG DR capsule 90 capsule 0 11/8/2021  No   Sig: Take 1 capsule by mouth once daily       LOV: 9/27/2021  Future Office visit: No future appointment scheduled at this time.      Routing refill request to provider for review/approval because:  Received limited script with no refills.    Requested Prescriptions   Pending Prescriptions Disp Refills     omeprazole (PRILOSEC) 20 MG DR capsule [Pharmacy Med Name: Omeprazole 20 MG Oral Capsule Delayed Release] 90 capsule 0     Sig: Take 1 capsule by mouth once daily       PPI Protocol Passed - 2/15/2022  9:31 AM        Passed - Not on Clopidogrel (unless Pantoprazole ordered)        Passed - No diagnosis of osteoporosis on record        Passed - Recent (12 mo) or future (30 days) visit within the authorizing provider's specialty     Patient has had an office visit with the authorizing provider or a provider within the authorizing providers department within the previous 12 mos or has a future within next 30 days. See \"Patient Info\" tab in inbasket, or \"Choose Columns\" in Meds & Orders section of the refill encounter.              Passed - Medication is active on med list        Passed - Patient is age 18 or older           Unable to complete prescription refill per RN Medication Refill Policy.................... Nilam Srivastava RN ....................  2/15/2022   9:42 AM        "

## 2022-02-15 NOTE — TELEPHONE ENCOUNTER
Disp Refills Start End LATASHA   omeprazole (PRILOSEC) 20 MG DR capsule 90 capsule 3 2/15/2022  No   Sig: Take 1 capsule by mouth once daily     Prescription refused.  This is a duplicate request.  Nilam Srivastava RN.......2/15/2022 10:46 AM

## 2022-08-01 DIAGNOSIS — M06.9 RHEUMATOID ARTHRITIS, INVOLVING UNSPECIFIED SITE, UNSPECIFIED WHETHER RHEUMATOID FACTOR PRESENT (H): ICD-10-CM

## 2022-08-01 RX ORDER — METHOTREXATE 2.5 MG/1
TABLET ORAL
Qty: 72 TABLET | Refills: 0 | Status: SHIPPED | OUTPATIENT
Start: 2022-08-01 | End: 2022-10-06

## 2022-08-01 NOTE — TELEPHONE ENCOUNTER
[facility] sent Rx request for the following:      Methotrexate Sodium 2.5 MG Oral Tablet      Last Prescription Date:   9/7/2021  Last Fill Qty/Refills:         72, R-3    Last Office Visit:              9/27/2021   Future Office visit:           none    Pj Babcock RN, BSN  ....................  8/1/2022   3:10 PM

## 2022-09-17 ENCOUNTER — HEALTH MAINTENANCE LETTER (OUTPATIENT)
Age: 62
End: 2022-09-17

## 2022-09-19 DIAGNOSIS — I10 ESSENTIAL HYPERTENSION: ICD-10-CM

## 2022-09-19 NOTE — TELEPHONE ENCOUNTER
"Encounter also routed to [facility] to schedule annual visit Corinne R Thayer, RN on 9/19/2022 at 1:39 PM    Last Prescription Date: 9/22/21  Last Qty/Refills: 90 / 3  Last Office Visit: 9/27/21  Future Office Visit: none     Requested Prescriptions   Pending Prescriptions Disp Refills     lisinopril-hydrochlorothiazide (ZESTORETIC) 20-25 MG tablet [Pharmacy Med Name: Lisinopril-hydroCHLOROthiazide 20-25 MG Oral Tablet] 90 tablet 0     Sig: Take 1 tablet by mouth once daily       Diuretics (Including Combos) Protocol Failed - 9/19/2022  9:31 AM        Failed - Normal serum creatinine on file in past 12 months     Recent Labs   Lab Test 09/27/21  1635   CR 1.33*              Failed - Normal serum sodium on file in past 12 months     Recent Labs   Lab Test 09/27/21  1635   *              Passed - Blood pressure under 140/90 in past 12 months     BP Readings from Last 3 Encounters:   09/27/21 98/74   08/14/20 120/82   07/26/19 127/82                 Passed - Recent (12 mo) or future (30 days) visit within the authorizing provider's specialty     Patient has had an office visit with the authorizing provider or a provider within the authorizing providers department within the previous 12 mos or has a future within next 30 days. See \"Patient Info\" tab in inbasket, or \"Choose Columns\" in Meds & Orders section of the refill encounter.              Passed - Medication is active on med list        Passed - Patient is age 18 or older        Passed - Normal serum potassium on file in past 12 months     Recent Labs   Lab Test 09/27/21  1635   POTASSIUM 4.0                   ACE Inhibitors (Including Combos) Protocol Failed - 9/19/2022  9:31 AM        Failed - Normal serum creatinine on file in past 12 months     Recent Labs   Lab Test 09/27/21  1635   CR 1.33*       Ok to refill medication if creatinine is low          Passed - Blood pressure under 140/90 in past 12 months     BP Readings from Last 3 Encounters: " "  09/27/21 98/74   08/14/20 120/82   07/26/19 127/82                 Passed - Recent (12 mo) or future (30 days) visit within the authorizing provider's specialty     Patient has had an office visit with the authorizing provider or a provider within the authorizing providers department within the previous 12 mos or has a future within next 30 days. See \"Patient Info\" tab in inbasket, or \"Choose Columns\" in Meds & Orders section of the refill encounter.              Passed - Medication is active on med list        Passed - Patient is age 18 or older        Passed - Normal serum potassium on file in past 12 months     Recent Labs   Lab Test 09/27/21  1635   POTASSIUM 4.0                  "

## 2022-09-20 RX ORDER — LISINOPRIL AND HYDROCHLOROTHIAZIDE 20; 25 MG/1; MG/1
1 TABLET ORAL DAILY
Qty: 90 TABLET | Refills: 0 | Status: SHIPPED | OUTPATIENT
Start: 2022-09-20 | End: 2022-10-06

## 2022-10-06 ENCOUNTER — OFFICE VISIT (OUTPATIENT)
Dept: INTERNAL MEDICINE | Facility: OTHER | Age: 62
End: 2022-10-06
Attending: INTERNAL MEDICINE
Payer: COMMERCIAL

## 2022-10-06 VITALS
SYSTOLIC BLOOD PRESSURE: 128 MMHG | TEMPERATURE: 97 F | HEART RATE: 72 BPM | WEIGHT: 214.4 LBS | OXYGEN SATURATION: 98 % | DIASTOLIC BLOOD PRESSURE: 86 MMHG | HEIGHT: 72 IN | BODY MASS INDEX: 29.04 KG/M2 | RESPIRATION RATE: 16 BRPM

## 2022-10-06 DIAGNOSIS — K21.9 GASTROESOPHAGEAL REFLUX DISEASE WITHOUT ESOPHAGITIS: ICD-10-CM

## 2022-10-06 DIAGNOSIS — R97.20 ELEVATED PROSTATE SPECIFIC ANTIGEN (PSA): ICD-10-CM

## 2022-10-06 DIAGNOSIS — E78.5 HYPERLIPIDEMIA, UNSPECIFIED HYPERLIPIDEMIA TYPE: ICD-10-CM

## 2022-10-06 DIAGNOSIS — I10 ESSENTIAL HYPERTENSION: ICD-10-CM

## 2022-10-06 DIAGNOSIS — I10 PRIMARY HYPERTENSION: ICD-10-CM

## 2022-10-06 DIAGNOSIS — M06.9 RHEUMATOID ARTHRITIS, INVOLVING UNSPECIFIED SITE, UNSPECIFIED WHETHER RHEUMATOID FACTOR PRESENT (H): Primary | ICD-10-CM

## 2022-10-06 DIAGNOSIS — R73.9 HYPERGLYCEMIA: ICD-10-CM

## 2022-10-06 LAB
ALBUMIN SERPL-MCNC: 4.1 G/DL (ref 3.5–5.7)
ALP SERPL-CCNC: 55 U/L (ref 34–104)
ALT SERPL W P-5'-P-CCNC: 20 U/L (ref 7–52)
ANION GAP SERPL CALCULATED.3IONS-SCNC: 4 MMOL/L (ref 3–14)
AST SERPL W P-5'-P-CCNC: 19 U/L (ref 13–39)
BILIRUB SERPL-MCNC: 1 MG/DL (ref 0.3–1)
BUN SERPL-MCNC: 15 MG/DL (ref 7–25)
CALCIUM SERPL-MCNC: 9.5 MG/DL (ref 8.6–10.3)
CHLORIDE BLD-SCNC: 94 MMOL/L (ref 98–107)
CHOLEST SERPL-MCNC: 150 MG/DL
CO2 SERPL-SCNC: 31 MMOL/L (ref 21–31)
CREAT SERPL-MCNC: 1.15 MG/DL (ref 0.7–1.3)
ERYTHROCYTE [DISTWIDTH] IN BLOOD BY AUTOMATED COUNT: 13.2 % (ref 10–15)
FASTING STATUS PATIENT QL REPORTED: ABNORMAL
GFR SERPL CREATININE-BSD FRML MDRD: 71 ML/MIN/1.73M2
GLUCOSE BLD-MCNC: 105 MG/DL (ref 70–105)
HCT VFR BLD AUTO: 42.2 % (ref 40–53)
HDLC SERPL-MCNC: 45 MG/DL (ref 23–92)
HGB BLD-MCNC: 15.6 G/DL (ref 13.3–17.7)
LDLC SERPL CALC-MCNC: 75 MG/DL
MCH RBC QN AUTO: 35 PG (ref 26.5–33)
MCHC RBC AUTO-ENTMCNC: 37 G/DL (ref 31.5–36.5)
MCV RBC AUTO: 95 FL (ref 78–100)
NONHDLC SERPL-MCNC: 105 MG/DL
PLATELET # BLD AUTO: 180 10E3/UL (ref 150–450)
POTASSIUM BLD-SCNC: 4 MMOL/L (ref 3.5–5.1)
PROT SERPL-MCNC: 7 G/DL (ref 6.4–8.9)
PSA SERPL-MCNC: 5.26 UG/L (ref 0–4)
RBC # BLD AUTO: 4.46 10E6/UL (ref 4.4–5.9)
SODIUM SERPL-SCNC: 129 MMOL/L (ref 134–144)
TRIGL SERPL-MCNC: 151 MG/DL
WBC # BLD AUTO: 4.5 10E3/UL (ref 4–11)

## 2022-10-06 PROCEDURE — 80053 COMPREHEN METABOLIC PANEL: CPT | Mod: ZL | Performed by: INTERNAL MEDICINE

## 2022-10-06 PROCEDURE — 85027 COMPLETE CBC AUTOMATED: CPT | Mod: ZL | Performed by: INTERNAL MEDICINE

## 2022-10-06 PROCEDURE — 80061 LIPID PANEL: CPT | Mod: ZL | Performed by: INTERNAL MEDICINE

## 2022-10-06 PROCEDURE — 82040 ASSAY OF SERUM ALBUMIN: CPT | Mod: ZL | Performed by: INTERNAL MEDICINE

## 2022-10-06 PROCEDURE — 84153 ASSAY OF PSA TOTAL: CPT | Mod: ZL | Performed by: INTERNAL MEDICINE

## 2022-10-06 PROCEDURE — G0463 HOSPITAL OUTPT CLINIC VISIT: HCPCS

## 2022-10-06 PROCEDURE — 36415 COLL VENOUS BLD VENIPUNCTURE: CPT | Mod: ZL | Performed by: INTERNAL MEDICINE

## 2022-10-06 PROCEDURE — 99396 PREV VISIT EST AGE 40-64: CPT | Performed by: INTERNAL MEDICINE

## 2022-10-06 RX ORDER — FOLIC ACID 1 MG/1
1000 TABLET ORAL DAILY
Qty: 90 TABLET | Refills: 3 | Status: SHIPPED | OUTPATIENT
Start: 2022-10-06 | End: 2023-11-03

## 2022-10-06 RX ORDER — LISINOPRIL AND HYDROCHLOROTHIAZIDE 20; 25 MG/1; MG/1
1 TABLET ORAL DAILY
Qty: 90 TABLET | Refills: 3 | Status: SHIPPED | OUTPATIENT
Start: 2022-10-06 | End: 2023-10-13

## 2022-10-06 RX ORDER — METHOTREXATE 2.5 MG/1
15 TABLET ORAL WEEKLY
Qty: 72 TABLET | Refills: 3 | Status: SHIPPED | OUTPATIENT
Start: 2022-10-06 | End: 2023-10-13

## 2022-10-06 ASSESSMENT — ENCOUNTER SYMPTOMS
WEAKNESS: 0
PALPITATIONS: 0
JOINT SWELLING: 0
EYE PAIN: 0
TROUBLE SWALLOWING: 0
FEVER: 0
BLOOD IN STOOL: 0
BACK PAIN: 0
SLEEP DISTURBANCE: 0
CHILLS: 0
VOMITING: 0
DIZZINESS: 0
NECK PAIN: 0
SINUS PRESSURE: 0
BRUISES/BLEEDS EASILY: 0
NUMBNESS: 0
DIAPHORESIS: 0
FLANK PAIN: 0
VOICE CHANGE: 0
ABDOMINAL DISTENTION: 0
ARTHRALGIAS: 1
COUGH: 0
UNEXPECTED WEIGHT CHANGE: 0
ABDOMINAL PAIN: 0
DIARRHEA: 0
HEMATURIA: 0
EYE REDNESS: 0
DYSURIA: 0
CONSTIPATION: 0
SEIZURES: 0
APPETITE CHANGE: 0
ADENOPATHY: 0
SHORTNESS OF BREATH: 0
AGITATION: 0
DIFFICULTY URINATING: 0
FREQUENCY: 0
TREMORS: 0
FATIGUE: 0
WHEEZING: 0
RHINORRHEA: 0
SPEECH DIFFICULTY: 0
CONFUSION: 0
SORE THROAT: 0
LIGHT-HEADEDNESS: 0
HALLUCINATIONS: 0
NERVOUS/ANXIOUS: 0
HEADACHES: 0
SINUS PAIN: 0
CHEST TIGHTNESS: 0
WOUND: 0
NAUSEA: 0
NECK STIFFNESS: 0

## 2022-10-06 ASSESSMENT — PAIN SCALES - GENERAL: PAINLEVEL: MILD PAIN (2)

## 2022-10-06 NOTE — PROGRESS NOTES
Chief Complaint:  This patient is here for a comprehensive review of their multiple medical problems, renewal of medications and update on necessary health maintenance issues.      HPI: He is in today for physical.  He has a history of rheumatoid arthritis.  He is maintained on methotrexate 6 tablets weekly along with his folic acid and 1 Relafen daily.  With this, his arthritis is generally very well controlled.  He admits that his knees are starting to wear out and he may have to do something about them someday.    He has a history of treated hypertension.  He has excellent control.  He does have some mild hyponatremia as a result of his diuretic therapy but he would like to continue as is without change as he is tolerating his medication and has great blood pressure control.  He has intermittent reflux disease, he takes PPI on a daily basis for control.  He has a history of an elevated PSA as well as erectile dysfunction and is due for recheck PSA.  He takes Viagra on an as-needed basis.    Otherwise he is feeling well.  Medications are reconciled.  Past medical history, past surgical history, family history and social histories are reviewed and updated.  Immunizations are reviewed.  He does not want to get the flu shot.  He will get a COVID booster but he just had his second shingles shot so he wants to wait a little bit.  Colonoscopy will be due in 2 years.    Past Medical History:   Diagnosis Date     Equivocal stress test     Cardiolyte     Essential (primary) hypertension     No Comments Provided     Gastro-esophageal reflux disease without esophagitis     No Comments Provided     Hyperlipidemia     No Comments Provided     Male erectile dysfunction     7/2/2014     Rheumatoid arthritis (H)     No Comments Provided     Tubular adenoma        Past Surgical History:   Procedure Laterality Date     ARTHROSCOPY KNEE Left     No Comments Provided     ARTHROSCOPY SHOULDER Left     08/2011     ARTHROTOMY WRIST Left      Wrist surgery with fusion.     COLONOSCOPY  2009,Normal screening colonoscopy, follow up recommended in 10 years     COLONOSCOPY N/A 2019    tubular adenoma, 5 year follow up     ELBOW SURGERY Right     Right elbow surgery for bone spurs/chips     ESOPHAGOSCOPY, GASTROSCOPY, DUODENOSCOPY (EGD), COMBINED      2015,[facility]     VASECTOMY      No Comments Provided       Current Outpatient Medications   Medication Sig Dispense Refill     aspirin (ASA) 81 MG tablet Take 1 tablet (81 mg) by mouth daily       folic acid (FOLVITE) 1 MG tablet Take 1 tablet (1,000 mcg) by mouth daily 90 tablet 3     lisinopril-hydrochlorothiazide (ZESTORETIC) 20-25 MG tablet Take 1 tablet by mouth daily 90 tablet 0     methotrexate sodium 2.5 MG TABS TAKE 6 TABLETS BY MOUTH ONCE A WEEK 72 tablet 0     nabumetone (RELAFEN) 750 MG tablet TAKE 1 TABLET BY MOUTH TWICE DAILY AS NEEDED FOR MODERATE PAIN 120 tablet 3     omeprazole (PRILOSEC) 20 MG DR capsule Take 1 capsule by mouth once daily 90 capsule 3     sildenafil (VIAGRA) 100 MG tablet TAKE 1 TABLET BY MOUTH ONCE DAILY AS NEEDED 6 tablet 11       No Known Allergies    Family History   Problem Relation Age of Onset     Other - See Comments Father          of some type of aneurysm.     Hypertension Mother          from old age     Cancer Daughter         Cancer,Leukemia       Social History     Socioeconomic History     Marital status:      Spouse name: Not on file     Number of children: Not on file     Years of education: Not on file     Highest education level: Not on file   Occupational History     Not on file   Tobacco Use     Smoking status: Never Smoker     Smokeless tobacco: Never Used   Substance and Sexual Activity     Alcohol use: Yes     Alcohol/week: 5.0 standard drinks     Comment: 1-2 times a week     Drug use: No     Comment: Drug use: No     Sexual activity: Not Currently   Other Topics Concern     Parent/sibling w/ CABG, MI or  angioplasty before 65F 55M? Not Asked   Social History Narrative    He worked as a  for [employer]  , lives in [place].  Works at [employer].     Social Determinants of Health     Financial Resource Strain: Not on file   Food Insecurity: Not on file   Transportation Needs: Not on file   Physical Activity: Not on file   Stress: Not on file   Social Connections: Not on file   Intimate Partner Violence: Not on file   Housing Stability: Not on file       Review of Systems   Constitutional: Negative for appetite change, chills, diaphoresis, fatigue, fever and unexpected weight change.   HENT: Negative for ear pain, rhinorrhea, sinus pressure, sinus pain, sore throat, trouble swallowing and voice change.    Eyes: Negative for pain, redness and visual disturbance.   Respiratory: Negative for cough, chest tightness, shortness of breath and wheezing.    Cardiovascular: Negative for chest pain, palpitations and leg swelling.   Gastrointestinal: Negative for abdominal distention, abdominal pain, blood in stool, constipation, diarrhea, nausea and vomiting.   Endocrine: Negative for cold intolerance and heat intolerance.   Genitourinary: Negative for difficulty urinating, dysuria, flank pain, frequency and hematuria.   Musculoskeletal: Positive for arthralgias. Negative for back pain, joint swelling, neck pain and neck stiffness.   Skin: Negative for rash and wound.   Allergic/Immunologic: Negative for immunocompromised state.   Neurological: Negative for dizziness, tremors, seizures, syncope, speech difficulty, weakness, light-headedness, numbness and headaches.   Hematological: Negative for adenopathy. Does not bruise/bleed easily.   Psychiatric/Behavioral: Negative for agitation, behavioral problems, confusion, hallucinations and sleep disturbance. The patient is not nervous/anxious.        Physical Exam  Vitals and nursing note reviewed.   Constitutional:       General: He is not in acute  distress.     Appearance: He is well-developed. He is not diaphoretic.   HENT:      Head: Normocephalic.      Right Ear: Tympanic membrane, ear canal and external ear normal.      Left Ear: Tympanic membrane, ear canal and external ear normal.      Nose: Nose normal.      Mouth/Throat:      Pharynx: No oropharyngeal exudate.   Eyes:      Conjunctiva/sclera: Conjunctivae normal.      Pupils: Pupils are equal, round, and reactive to light.   Neck:      Thyroid: No thyroid mass or thyromegaly.      Vascular: Normal carotid pulses. No carotid bruit or JVD.      Trachea: No tracheal deviation.   Cardiovascular:      Rate and Rhythm: Normal rate and regular rhythm.      Heart sounds: Normal heart sounds. No murmur heard.    No friction rub. No gallop.   Pulmonary:      Effort: Pulmonary effort is normal. No respiratory distress.      Breath sounds: Normal breath sounds. No stridor. No decreased breath sounds, wheezing, rhonchi or rales.   Abdominal:      General: Bowel sounds are normal. There is no distension.      Palpations: Abdomen is soft. There is no mass.      Tenderness: There is no abdominal tenderness. There is no guarding or rebound.      Hernia: No hernia is present.   Genitourinary:     Penis: Normal.       Testes: Normal.      Prostate: Normal.      Rectum: Normal.      Comments: Prostate somewhat high riding and difficult to feel  Musculoskeletal:         General: Normal range of motion.      Cervical back: Normal range of motion and neck supple.      Right lower leg: No edema.      Left lower leg: No edema.   Lymphadenopathy:      Cervical: No cervical adenopathy.   Skin:     General: Skin is warm and dry.      Findings: No rash.   Neurological:      Mental Status: He is alert and oriented to person, place, and time.      Cranial Nerves: No cranial nerve deficit.      Sensory: No sensory deficit.      Motor: No abnormal muscle tone.      Coordination: Coordination normal.      Deep Tendon Reflexes:  Reflexes normal.         Assessment:      ICD-10-CM    1. Rheumatoid arthritis, involving unspecified site, unspecified whether rheumatoid factor present (H)  M06.9 CBC W PLT No Diff   2. Primary hypertension  I10    3. Hyperglycemia  R73.9    4. Hyperlipidemia, unspecified hyperlipidemia type  E78.5 Comprehensive metabolic panel     Lipid Panel   5. Gastroesophageal reflux disease without esophagitis  K21.9    6. Elevated prostate specific antigen (PSA)  R97.20 Prostate Specific Antigen        Plan: He appears to be doing well and everything appears stable.  Medications will continue without change, refills were done as needed.  Complete lab drawn and pending, I will send him a letter with the results.  He will get his COVID booster at some point in time.  Assuming all goes well, follow-up annually.

## 2022-10-06 NOTE — NURSING NOTE
"Chief Complaint   Patient presents with     Physical       Initial /86   Pulse 72   Temp 97  F (36.1  C) (Temporal)   Resp 16   Ht 1.84 m (6' 0.44\")   Wt 97.3 kg (214 lb 6.4 oz)   SpO2 98%   BMI 28.72 kg/m   Estimated body mass index is 28.72 kg/m  as calculated from the following:    Height as of this encounter: 1.84 m (6' 0.44\").    Weight as of this encounter: 97.3 kg (214 lb 6.4 oz).  FOOD SECURITY SCREENING QUESTIONS  Hunger Vital Signs:  Within the past 12 months we worried whether our food would run out before we got money to buy more. Never  Within the past 12 months the food we bought just didn't last and we didn't have money to get more. Never        Gonzalo Rodarte, LPN    "

## 2022-10-06 NOTE — LETTER
October 10, 2022      Wicho Hawk  [address]  [place] [address]        Dear Wicho,    Below are the results of your recent labs:    Results for orders placed or performed in visit on 10/06/22   CBC W PLT No Diff     Status: Abnormal   Result Value Ref Range    WBC Count 4.5 4.0 - 11.0 10e3/uL    RBC Count 4.46 4.40 - 5.90 10e6/uL    Hemoglobin 15.6 13.3 - 17.7 g/dL    Hematocrit 42.2 40.0 - 53.0 %    MCV 95 78 - 100 fL    MCH 35.0 (H) 26.5 - 33.0 pg    MCHC 37.0 (H) 31.5 - 36.5 g/dL    RDW 13.2 10.0 - 15.0 %    Platelet Count 180 150 - 450 10e3/uL   Prostate Specific Antigen     Status: Abnormal   Result Value Ref Range    PSA Tumor Marker 5.26 (H) 0.00 - 4.00 ug/L    Narrative    The DXI Access PSAS WHO assay is a two site immunoenzymatic   assay. Assay values obtained with different assay methods cannot be used   interchangeably due to differences in assay methods and reagent specificity.   Lipid Panel     Status: Abnormal   Result Value Ref Range    Cholesterol 150 <200 mg/dL    Triglycerides 151 (H) <150 mg/dL    Direct Measure HDL 45 23 - 92 mg/dL    LDL Cholesterol Calculated 75 <=100 mg/dL    Non HDL Cholesterol 105 <130 mg/dL    Patient Fasting > 8hrs? Unknown     Narrative    Cholesterol  Desirable:  <200 mg/dL    Triglycerides  Normal:  Less than 150 mg/dL  Borderline High:  150-199 mg/dL  High:  200-499 mg/dL  Very High:  Greater than or equal to 500 mg/dL    Direct Measure HDL  Female:  Greater than or equal to 50 mg/dL   Male:  Greater than or equal to 40 mg/dL    LDL Cholesterol  Desirable:  <100mg/dL  Above Desirable:  100-129 mg/dL   Borderline High:  130-159 mg/dL   High:  160-189 mg/dL   Very High:  >= 190 mg/dL    Non HDL Cholesterol  Desirable:  130 mg/dL  Above Desirable:  130-159 mg/dL  Borderline High:  160-189 mg/dL  High:  190-219 mg/dL  Very High:  Greater than or equal to 220 mg/dL   Comprehensive metabolic panel     Status: Abnormal   Result Value Ref Range    Sodium 129 (L)  134 - 144 mmol/L    Potassium 4.0 3.5 - 5.1 mmol/L    Chloride 94 (L) 98 - 107 mmol/L    Carbon Dioxide (CO2) 31 21 - 31 mmol/L    Anion Gap 4 3 - 14 mmol/L    Urea Nitrogen 15 7 - 25 mg/dL    Creatinine 1.15 0.70 - 1.30 mg/dL    Calcium 9.5 8.6 - 10.3 mg/dL    Glucose 105 70 - 105 mg/dL    Alkaline Phosphatase 55 34 - 104 U/L    AST 19 13 - 39 U/L    ALT 20 7 - 52 U/L    Protein Total 7.0 6.4 - 8.9 g/dL    Albumin 4.1 3.5 - 5.7 g/dL    Bilirubin Total 1.0 0.3 - 1.0 mg/dL    GFR Estimate 71 >60 mL/min/1.73m2        Overall, your blood tests look fine.  Your PSA is once again mildly elevated but not significantly changed.  Your sodium level is low once again as well but stable.  I am satisfied with your results.  If you have any questions, feel free to contact me.    Sincerely,        Leonardo Gomes MD  Internal Medicine  [facility]

## 2023-06-09 DIAGNOSIS — N52.9 ERECTILE DYSFUNCTION, UNSPECIFIED ERECTILE DYSFUNCTION TYPE: ICD-10-CM

## 2023-06-14 RX ORDER — SILDENAFIL 100 MG/1
100 TABLET, FILM COATED ORAL DAILY PRN
Qty: 6 TABLET | Refills: 1 | Status: SHIPPED | OUTPATIENT
Start: 2023-06-14 | End: 2023-11-03

## 2023-06-14 NOTE — TELEPHONE ENCOUNTER
[id number] of [place] sent Rx request for the following:      Requested Prescriptions   Pending Prescriptions Disp Refills     sildenafil (VIAGRA) 100 MG tablet 6 tablet 11     Sig: Take 1 tablet (100 mg) by mouth daily as needed   Last Prescription Date:   9/22/21  Last Fill Qty/Refills:         6, R-11    Last Office Visit:              10/6/22   Future Office visit:           None    Per LOV note:  Assuming all goes well, follow-up annually.    In clinical absence of patient's primary, Leonardo Gomes, patient is requesting that this message be sent to the covering provider for consideration please.    Tamia Lucas RN .............. 6/14/2023  9:14 AM

## 2023-10-13 DIAGNOSIS — I10 ESSENTIAL HYPERTENSION: ICD-10-CM

## 2023-10-13 DIAGNOSIS — M06.9 RHEUMATOID ARTHRITIS, INVOLVING UNSPECIFIED SITE, UNSPECIFIED WHETHER RHEUMATOID FACTOR PRESENT (H): ICD-10-CM

## 2023-10-13 RX ORDER — METHOTREXATE 2.5 MG/1
15 TABLET ORAL WEEKLY
Qty: 72 TABLET | Refills: 0 | Status: SHIPPED | OUTPATIENT
Start: 2023-10-13 | End: 2023-11-03

## 2023-10-13 RX ORDER — LISINOPRIL AND HYDROCHLOROTHIAZIDE 20; 25 MG/1; MG/1
1 TABLET ORAL DAILY
Qty: 90 TABLET | Refills: 0 | Status: SHIPPED | OUTPATIENT
Start: 2023-10-13 | End: 2023-11-03

## 2023-10-13 RX ORDER — MELOXICAM 7.5 MG/1
7.5 TABLET ORAL 2 TIMES DAILY WITH MEALS
Qty: 60 TABLET | Refills: 0 | Status: SHIPPED | OUTPATIENT
Start: 2023-10-13 | End: 2023-11-03

## 2023-10-13 NOTE — TELEPHONE ENCOUNTER
Called and spoke to Patient after verifying last name and date of birth. Pt states Meloxicam was prescribed in the past by Laron Kennedy in [place]. He doesn't know the strength or dose, but has picked up at [facility] in [place] in the past. Called [facility] and spoke with Lois (technician), after verifying Pt's last name and . She states last prescription for Meloxican was for 7.5 mg tablets with instructions to take 1 tab twice daily with meals. Last dispensed #60 (30 day supply) on 23.    Requested Prescriptions   Pending Prescriptions Disp Refills    methotrexate sodium 2.5 MG TABS 72 tablet 3     Sig: Take 6 tablets (15 mg) by mouth once a week   Last Prescription Date:   10/6/22  Last Fill Qty/Refills:         72, R-3 (Dr. Gomes)      There is no refill protocol information for this order       lisinopril-hydrochlorothiazide (ZESTORETIC) 20-25 MG tablet 90 tablet 3     Sig: Take 1 tablet by mouth daily   Last Prescription Date:   10/6/22  Last Fill Qty/Refills:         90, R-3      Diuretics (Including Combos) Protocol Failed - 10/13/2023 11:08 AM        Failed - Blood pressure under 140/90 in past 12 months     BP Readings from Last 3 Encounters:   10/06/22 128/86   21 98/74   20 120/82           Failed - Recent (12 mo) or future (30 days) visit within the authorizing provider's specialty        Failed - Normal serum creatinine on file in past 12 months     Recent Labs   Lab Test 10/06/22  1119   CR 1.15           Failed - Normal serum potassium on file in past 12 months     Recent Labs   Lab Test 10/06/22  1119   POTASSIUM 4.0           Failed - Normal serum sodium on file in past 12 months     Recent Labs   Lab Test 10/06/22  1119   *       ACE Inhibitors (Including Combos) Protocol Failed - 10/13/2023 11:08 AM        Failed - Blood pressure under 140/90 in past 12 months     BP Readings from Last 3 Encounters:   10/06/22 128/86   21 98/74   20 120/82           Failed  - Recent (12 mo) or future (30 days) visit within the authorizing provider's specialty        Failed - Normal serum creatinine on file in past 12 months     Recent Labs   Lab Test 10/06/22  1119   CR 1.15   Ok to refill medication if creatinine is low        Failed - Normal serum potassium on file in past 12 months     Recent Labs   Lab Test 10/06/22  1119   POTASSIUM 4.0          meloxicam (MOBIC) 7.5 MG tablet 60 tablet      Sig: Take 1 tablet (7.5 mg) by mouth 2 times daily (with meals)       NSAID Medications Failed - 10/13/2023 11:08 AM        Failed - Blood pressure under 140/90 in past 12 months     BP Readings from Last 3 Encounters:   10/06/22 128/86   09/27/21 98/74   08/14/20 120/82           Failed - Normal ALT on file in past 12 months     Recent Labs   Lab Test 10/06/22  1119 03/28/18  1002 03/10/17  1137   ALT 20   < >  --    GICHALT  --   --  14    < > = values in this interval not displayed.           Failed - Normal AST on file in past 12 months     Recent Labs   Lab Test 10/06/22  1119 03/28/18  1002 03/10/17  1137   AST 19   < >  --    GICHAST  --   --  17    < > = values in this interval not displayed.           Failed - Recent (12 mo) or future (30 days) visit within the authorizing provider's specialty        Failed - Patient is age 6-64 years        Failed - Normal CBC on file in past 12 months     Recent Labs   Lab Test 10/06/22  1119 03/28/18  1002 03/10/17  1108   WBC 4.5   < >  --    GICHWBC  --   --  7.5   RBC 4.46   < >  --    GICHRBC  --   --  4.67   HGB 15.6   < > 15.2   HCT 42.2   < > 43.7      < > 284    < > = values in this interval not displayed.           Failed - Medication is active on med list        Failed - Normal serum creatinine on file in past 12 months     Recent Labs   Lab Test 10/06/22  1119   CR 1.15   Ok to refill medication if creatinine is low     Last Office Visit:              10/6/22 (Dr. Gomes)  Future Office visit:             Next 5 appointments (look  out 90 days)      Oct 27, 2023  8:00 AM  PHYSICAL with Stephan Mark MD  [facility] ([facility] ) [address]  [place] [id number] [phone]          Unable to complete prescription refill per RN Medication Refill Policy.     Tamia Lucas RN .............. 10/13/2023  11:10 AM

## 2023-10-13 NOTE — TELEPHONE ENCOUNTER
Reason for call: Medication or medication refill    Name of medication requested: Methotrexate, Meloxicam, and Lisinipril.    How many days of medication do you have left? 0    What pharmacy do you use? unknown    Preferred method for responding to this message: Telephone Call    Phone number patient can be reached at: Cell number on file:    Telephone Information:   Mobile [phone]       If we cannot reach you directly, may we leave a detailed response at the number you provided? Yes     Patient is a former patient of Nimesh Gomes. Pt called today to schedule physical/establish care with TJP. Appointment is scheduled for 10/27/23.     Mounika Leung on 10/13/2023 at 10:50 AM

## 2023-11-03 ENCOUNTER — OFFICE VISIT (OUTPATIENT)
Dept: FAMILY MEDICINE | Facility: OTHER | Age: 63
End: 2023-11-03
Attending: NURSE PRACTITIONER
Payer: COMMERCIAL

## 2023-11-03 VITALS
RESPIRATION RATE: 18 BRPM | SYSTOLIC BLOOD PRESSURE: 124 MMHG | OXYGEN SATURATION: 98 % | HEIGHT: 72 IN | HEART RATE: 80 BPM | BODY MASS INDEX: 28.96 KG/M2 | DIASTOLIC BLOOD PRESSURE: 88 MMHG | WEIGHT: 213.8 LBS

## 2023-11-03 DIAGNOSIS — Z00.00 ENCOUNTER FOR MEDICARE ANNUAL WELLNESS EXAM: Primary | ICD-10-CM

## 2023-11-03 DIAGNOSIS — Z12.5 SCREENING FOR PROSTATE CANCER: ICD-10-CM

## 2023-11-03 DIAGNOSIS — K21.9 GASTROESOPHAGEAL REFLUX DISEASE WITHOUT ESOPHAGITIS: ICD-10-CM

## 2023-11-03 DIAGNOSIS — Z13.220 LIPID SCREENING: ICD-10-CM

## 2023-11-03 DIAGNOSIS — M06.09 RHEUMATOID ARTHRITIS OF MULTIPLE SITES WITHOUT RHEUMATOID FACTOR (H): ICD-10-CM

## 2023-11-03 DIAGNOSIS — R97.20 ELEVATED PROSTATE SPECIFIC ANTIGEN (PSA): ICD-10-CM

## 2023-11-03 DIAGNOSIS — I10 ESSENTIAL HYPERTENSION: ICD-10-CM

## 2023-11-03 PROBLEM — Z79.899 HIGH RISK MEDICATION USE: Status: ACTIVE | Noted: 2018-04-20

## 2023-11-03 PROBLEM — R76.8 POSITIVE ANTI-CCP TEST: Status: ACTIVE | Noted: 2018-04-20

## 2023-11-03 LAB
ALBUMIN SERPL BCG-MCNC: 4.4 G/DL (ref 3.5–5.2)
ALP SERPL-CCNC: 80 U/L (ref 40–129)
ALT SERPL W P-5'-P-CCNC: 24 U/L (ref 0–70)
ANION GAP SERPL CALCULATED.3IONS-SCNC: 8 MMOL/L (ref 7–15)
AST SERPL W P-5'-P-CCNC: 27 U/L (ref 0–45)
BASOPHILS # BLD AUTO: 0 10E3/UL (ref 0–0.2)
BASOPHILS NFR BLD AUTO: 1 %
BILIRUB SERPL-MCNC: 0.8 MG/DL
BUN SERPL-MCNC: 13.2 MG/DL (ref 8–23)
CALCIUM SERPL-MCNC: 9.5 MG/DL (ref 8.8–10.2)
CHLORIDE SERPL-SCNC: 96 MMOL/L (ref 98–107)
CHOLEST SERPL-MCNC: 152 MG/DL
CREAT SERPL-MCNC: 1.2 MG/DL (ref 0.67–1.17)
DEPRECATED HCO3 PLAS-SCNC: 26 MMOL/L (ref 22–29)
EGFRCR SERPLBLD CKD-EPI 2021: 67 ML/MIN/1.73M2
EOSINOPHIL # BLD AUTO: 0.1 10E3/UL (ref 0–0.7)
EOSINOPHIL NFR BLD AUTO: 2 %
ERYTHROCYTE [DISTWIDTH] IN BLOOD BY AUTOMATED COUNT: 13.1 % (ref 10–15)
GLUCOSE SERPL-MCNC: 108 MG/DL (ref 70–99)
HCT VFR BLD AUTO: 44.6 % (ref 40–53)
HDLC SERPL-MCNC: 51 MG/DL
HGB BLD-MCNC: 16 G/DL (ref 13.3–17.7)
IMM GRANULOCYTES # BLD: 0 10E3/UL
IMM GRANULOCYTES NFR BLD: 1 %
LDLC SERPL CALC-MCNC: 80 MG/DL
LYMPHOCYTES # BLD AUTO: 1.1 10E3/UL (ref 0.8–5.3)
LYMPHOCYTES NFR BLD AUTO: 20 %
MCH RBC QN AUTO: 34 PG (ref 26.5–33)
MCHC RBC AUTO-ENTMCNC: 35.9 G/DL (ref 31.5–36.5)
MCV RBC AUTO: 95 FL (ref 78–100)
MONOCYTES # BLD AUTO: 0.8 10E3/UL (ref 0–1.3)
MONOCYTES NFR BLD AUTO: 14 %
NEUTROPHILS # BLD AUTO: 3.4 10E3/UL (ref 1.6–8.3)
NEUTROPHILS NFR BLD AUTO: 62 %
NONHDLC SERPL-MCNC: 101 MG/DL
NRBC # BLD AUTO: 0 10E3/UL
NRBC BLD AUTO-RTO: 0 /100
PLATELET # BLD AUTO: 175 10E3/UL (ref 150–450)
POTASSIUM SERPL-SCNC: 4.6 MMOL/L (ref 3.4–5.3)
PROT SERPL-MCNC: 7.2 G/DL (ref 6.4–8.3)
PSA SERPL DL<=0.01 NG/ML-MCNC: 7.32 NG/ML (ref 0–4.5)
RBC # BLD AUTO: 4.7 10E6/UL (ref 4.4–5.9)
SODIUM SERPL-SCNC: 130 MMOL/L (ref 135–145)
TRIGL SERPL-MCNC: 104 MG/DL
WBC # BLD AUTO: 5.4 10E3/UL (ref 4–11)

## 2023-11-03 PROCEDURE — 80061 LIPID PANEL: CPT | Mod: ZL | Performed by: NURSE PRACTITIONER

## 2023-11-03 PROCEDURE — 80053 COMPREHEN METABOLIC PANEL: CPT | Mod: ZL | Performed by: NURSE PRACTITIONER

## 2023-11-03 PROCEDURE — G0438 PPPS, INITIAL VISIT: HCPCS | Performed by: NURSE PRACTITIONER

## 2023-11-03 PROCEDURE — 99212 OFFICE O/P EST SF 10 MIN: CPT | Mod: 25 | Performed by: NURSE PRACTITIONER

## 2023-11-03 PROCEDURE — 36415 COLL VENOUS BLD VENIPUNCTURE: CPT | Mod: ZL | Performed by: NURSE PRACTITIONER

## 2023-11-03 PROCEDURE — 85004 AUTOMATED DIFF WBC COUNT: CPT | Mod: ZL | Performed by: NURSE PRACTITIONER

## 2023-11-03 PROCEDURE — G0463 HOSPITAL OUTPT CLINIC VISIT: HCPCS

## 2023-11-03 PROCEDURE — G0103 PSA SCREENING: HCPCS | Mod: ZL | Performed by: NURSE PRACTITIONER

## 2023-11-03 RX ORDER — MELOXICAM 7.5 MG/1
7.5 TABLET ORAL DAILY
Qty: 90 TABLET | Refills: 4 | Status: SHIPPED | OUTPATIENT
Start: 2023-11-03 | End: 2024-05-22

## 2023-11-03 RX ORDER — LISINOPRIL AND HYDROCHLOROTHIAZIDE 20; 25 MG/1; MG/1
1 TABLET ORAL DAILY
Qty: 90 TABLET | Refills: 0 | Status: SHIPPED | OUTPATIENT
Start: 2023-11-03 | End: 2024-02-20

## 2023-11-03 RX ORDER — FOLIC ACID 1 MG/1
1000 TABLET ORAL DAILY
Qty: 90 TABLET | Refills: 3 | Status: SHIPPED | OUTPATIENT
Start: 2023-11-03 | End: 2024-05-22

## 2023-11-03 RX ORDER — METHOTREXATE 2.5 MG/1
15 TABLET ORAL WEEKLY
Qty: 72 TABLET | Refills: 4 | Status: SHIPPED | OUTPATIENT
Start: 2023-11-03 | End: 2024-05-22

## 2023-11-03 ASSESSMENT — ENCOUNTER SYMPTOMS
DIZZINESS: 0
HEARTBURN: 0
HEADACHES: 0
DYSURIA: 0
ABDOMINAL PAIN: 0
WEAKNESS: 0
SHORTNESS OF BREATH: 0
DIARRHEA: 0
PARESTHESIAS: 0
PALPITATIONS: 0
SORE THROAT: 0
HEMATURIA: 0
EYE PAIN: 0
MYALGIAS: 1
JOINT SWELLING: 0
NAUSEA: 0
COUGH: 0
CHILLS: 0
HEMATOCHEZIA: 0
CONSTIPATION: 0
FEVER: 0
NERVOUS/ANXIOUS: 0
FREQUENCY: 0
ARTHRALGIAS: 1

## 2023-11-03 ASSESSMENT — PAIN SCALES - GENERAL: PAINLEVEL: NO PAIN (0)

## 2023-11-03 ASSESSMENT — ACTIVITIES OF DAILY LIVING (ADL): CURRENT_FUNCTION: NO ASSISTANCE NEEDED

## 2023-11-03 NOTE — PROGRESS NOTES
"SUBJECTIVE:   Wicho is a 63 year old who presents for Preventive Visit.      Are you in the first 12 months of your Medicare coverage?  No    Healthy Habits:     In general, how would you rate your overall health?  Good    Frequency of exercise:  1 day/week    Duration of exercise:  15-30 minutes    Do you usually eat at least 4 servings of fruit and vegetables a day, include whole grains    & fiber and avoid regularly eating high fat or \"junk\" foods?  No    Taking medications regularly:  Yes    Medication side effects:  None    Ability to successfully perform activities of daily living:  No assistance needed    Home Safety:  No safety concerns identified    Hearing Impairment:  Difficult to understand a speaker at a public meeting or Hinduism service and difficulty understanding soft or whispered speech    In the past 6 months, have you been bothered by leaking of urine?  No    In general, how would you rate your overall mental or emotional health?  Good    Additional concerns today:  No      Today's PHQ-2 Score:       11/3/2023    12:40 PM   PHQ-2 ( 1999 [employer])   Q1: Little interest or pleasure in doing things 0   Q2: Feeling down, depressed or hopeless 0   PHQ-2 Score 0   Q1: Little interest or pleasure in doing things Not at all   Q2: Feeling down, depressed or hopeless Not at all   PHQ-2 Score 0       Have you ever done Advance Care Planning? (For example, a Health Directive, POLST, or a discussion with a medical provider or your loved ones about your wishes): No, advance care planning information given to patient to review.  Patient declined advance care planning discussion at this time.       Fall risk  Fallen 2 or more times in the past year?: No  Any fall with injury in the past year?: No    Cognitive Screening   1) Repeat 3 items (Leader, Season, Table)    2) Clock draw: NORMAL  3) 3 item recall: Recalls 1 object   Results: NORMAL clock, 1-2 items recalled: COGNITIVE IMPAIRMENT LESS LIKELY    Mini-CogTM " Copyright EMIR Mckinley. Licensed by the author for use in [facility]; reprinted with permission (diana@[email or web address]). All rights reserved.      Do you have sleep apnea, excessive snoring or daytime drowsiness? : no    Reviewed and updated as needed this visit by clinical staff   Tobacco  Allergies  Meds      Soc Hx        Reviewed and updated as needed this visit by Provider                 Social History     Tobacco Use    Smoking status: Never    Smokeless tobacco: Never   Substance Use Topics    Alcohol use: Yes     Alcohol/week: 5.0 standard drinks of alcohol     Comment: 1-2 times a week             11/3/2023    12:40 PM   Alcohol Use   Prescreen: >3 drinks/day or >7 drinks/week? Yes   AUDIT SCORE  4     Do you have a current opioid prescription? No  Do you use any other controlled substances or medications that are not prescribed by a provider? None    He presents for annual Medicare wellness.  He does need refill medications and lab work updated today.  He has a diagnosis of rheumatoid arthritis, this affects his hands, knees, hips and feet.  He has not had any recent rheumatology follow-up.  He continues on methotrexate with folic acid, tolerating well.  Blood pressures have been stable in office, does not check these at home.  Tolerating lisinopril with hydrochlorothiazide without difficulties.  Continues on omeprazole daily for reflux and aspirin for prevention.  He also takes meloxicam 7.5 mg daily for arthritis.  He states he was previously taking sildenafil, this was not helpful so he does not want a refill.      Current providers sharing in care for this patient include:   Patient Care Team:  No Ref-Primary, Physician as PCP - General    The following health maintenance items are reviewed in Epic and correct as of today:  Health Maintenance   Topic Date Due    RSV VACCINE (Pregnancy & 60+) (1 - 1-dose 60+ series) Never done    COVID-19 Vaccine (4 - 2023-24 season) 09/01/2023    AORTIC ANEURYSM  SCREENING (SYSTEM ASSIGNED)  Never done    MEDICARE ANNUAL WELLNESS VISIT  10/06/2023    DTAP/TDAP/TD IMMUNIZATION (2 - Td or Tdap) 07/02/2024    COLORECTAL CANCER SCREENING  07/26/2024    FALL RISK ASSESSMENT  11/03/2024    Pneumococcal Vaccine: 65+ Years (3 - PPSV23 or PCV20) 08/14/2025    ADVANCE CARE PLANNING  09/27/2026    LIPID  10/06/2027    HEPATITIS C SCREENING  Completed    PHQ-2 (once per calendar year)  Completed    ZOSTER IMMUNIZATION  Completed    HIV SCREENING  Addressed    IPV IMMUNIZATION  Aged Out    HPV IMMUNIZATION  Aged Out    MENINGITIS IMMUNIZATION  Aged Out    RSV MONOCLONAL ANTIBODY  Aged Out    INFLUENZA VACCINE  Discontinued     BP Readings from Last 3 Encounters:   11/03/23 124/88   10/06/22 128/86   09/27/21 98/74    Wt Readings from Last 3 Encounters:   11/03/23 97 kg (213 lb 12.8 oz)   10/06/22 97.3 kg (214 lb 6.4 oz)   09/27/21 95.7 kg (211 lb)                  Patient Active Problem List   Diagnosis    Rheumatoid arthritis of multiple sites without rheumatoid factor (H)    ED (erectile dysfunction)    Esophageal reflux    Hyperlipidemia    Essential hypertension    Elevated prostate specific antigen (PSA)    Hyperglycemia    High risk medication use    Positive anti-CCP test     Past Surgical History:   Procedure Laterality Date    ARTHROSCOPY KNEE Left     No Comments Provided    ARTHROSCOPY SHOULDER Left     08/2011    ARTHROTOMY WRIST Left     Wrist surgery with fusion.    COLONOSCOPY  05/01/2009 5/01/2009,Normal screening colonoscopy, follow up recommended in 10 years    COLONOSCOPY N/A 7/26/2019    tubular adenoma, 5 year follow up    ELBOW SURGERY Right     Right elbow surgery for bone spurs/chips    ESOPHAGOSCOPY, GASTROSCOPY, DUODENOSCOPY (EGD), COMBINED      12/2015,[place]    VASECTOMY      No Comments Provided       Social History     Tobacco Use    Smoking status: Never    Smokeless tobacco: Never   Substance Use Topics    Alcohol use: Yes     Alcohol/week: 5.0 standard  "drinks of alcohol     Comment: 1-2 times a week     Family History   Problem Relation Age of Onset    Other - See Comments Father          of some type of aneurysm.    Hypertension Mother          from old age    Cancer Daughter         Cancer,Leukemia         Current Outpatient Medications   Medication Sig Dispense Refill    aspirin (ASA) 81 MG tablet Take 1 tablet (81 mg) by mouth daily      folic acid (FOLVITE) 1 MG tablet Take 1 tablet (1,000 mcg) by mouth daily 90 tablet 3    lisinopril-hydrochlorothiazide (ZESTORETIC) 20-25 MG tablet Take 1 tablet by mouth daily 90 tablet 0    meloxicam (MOBIC) 7.5 MG tablet Take 1 tablet (7.5 mg) by mouth daily 90 tablet 4    methotrexate 2.5 MG tablet Take 6 tablets (15 mg) by mouth once a week 72 tablet 4    omeprazole (PRILOSEC) 20 MG DR capsule Take 1 capsule (20 mg) by mouth daily 90 capsule 4     No Known Allergies        Review of Systems   Constitutional:  Negative for chills and fever.   HENT:  Positive for hearing loss. Negative for congestion, ear pain and sore throat.    Eyes:  Negative for pain and visual disturbance.   Respiratory:  Negative for cough and shortness of breath.    Cardiovascular:  Negative for chest pain, palpitations and peripheral edema.   Gastrointestinal:  Negative for abdominal pain, constipation, diarrhea, heartburn, hematochezia and nausea.   Genitourinary:  Positive for impotence. Negative for dysuria, frequency, genital sores, hematuria, penile discharge and urgency.   Musculoskeletal:  Positive for arthralgias and myalgias. Negative for joint swelling.   Skin:  Negative for rash.   Neurological:  Negative for dizziness, weakness, headaches and paresthesias.   Psychiatric/Behavioral:  Negative for mood changes. The patient is not nervous/anxious.          OBJECTIVE:   /88   Pulse 80   Resp 18   Ht 1.84 m (6' 0.44\")   Wt 97 kg (213 lb 12.8 oz)   SpO2 98%   BMI 28.65 kg/m   Estimated body mass index is 28.65 kg/m  as " "calculated from the following:    Height as of this encounter: 1.84 m (6' 0.44\").    Weight as of this encounter: 97 kg (213 lb 12.8 oz).  Physical Exam  GENERAL: healthy, alert and no distress  EYES: Eyes grossly normal to inspection, PERRL and conjunctivae and sclerae normal  HENT: ear canals and TM's normal, nose and mouth without ulcers or lesions  NECK: no adenopathy, no asymmetry, masses, or scars and thyroid normal to palpation  RESP: lungs clear to auscultation - no rales, rhonchi or wheezes  CV: regular rate and rhythm, normal S1 S2, no S3 or S4, no murmur, click or rub, no peripheral edema and peripheral pulses strong  ABDOMEN: soft, nontender, no hepatosplenomegaly, no masses and bowel sounds normal  MS: no gross musculoskeletal defects noted, no edema  SKIN: no suspicious lesions or rashes  NEURO: Normal strength and tone, mentation intact and speech normal  PSYCH: mentation appears normal, affect normal/bright    Diagnostic Test Results:  Labs reviewed in Epic  Results for orders placed or performed in visit on 11/03/23   PSA Screen GH     Status: Abnormal   Result Value Ref Range    Prostate Specific Antigen Screen 7.32 (H) 0.00 - 4.50 ng/mL    Narrative    This result is obtained using the Roche Elecsys total PSA method on the blas [id number] immunoassay analyzer. Results obtained with different assay methods or kits cannot be used interchangeably.   Lipid Panel     Status: Normal   Result Value Ref Range    Cholesterol 152 <200 mg/dL    Triglycerides 104 <150 mg/dL    Direct Measure HDL 51 >=40 mg/dL    LDL Cholesterol Calculated 80 <=100 mg/dL    Non HDL Cholesterol 101 <130 mg/dL    Narrative    Cholesterol  Desirable:  <200 mg/dL    Triglycerides  Normal:  Less than 150 mg/dL  Borderline High:  150-199 mg/dL  High:  200-499 mg/dL  Very High:  Greater than or equal to 500 mg/dL    Direct Measure HDL  Female:  Greater than or equal to 50 mg/dL   Male:  Greater than or equal to 40 mg/dL    LDL " Cholesterol  Desirable:  <100mg/dL  Above Desirable:  100-129 mg/dL   Borderline High:  130-159 mg/dL   High:  160-189 mg/dL   Very High:  >= 190 mg/dL    Non HDL Cholesterol  Desirable:  130 mg/dL  Above Desirable:  130-159 mg/dL  Borderline High:  160-189 mg/dL  High:  190-219 mg/dL  Very High:  Greater than or equal to 220 mg/dL   Comprehensive Metabolic Panel     Status: Abnormal   Result Value Ref Range    Sodium 130 (L) 135 - 145 mmol/L    Potassium 4.6 3.4 - 5.3 mmol/L    Carbon Dioxide (CO2) 26 22 - 29 mmol/L    Anion Gap 8 7 - 15 mmol/L    Urea Nitrogen 13.2 8.0 - 23.0 mg/dL    Creatinine 1.20 (H) 0.67 - 1.17 mg/dL    GFR Estimate 67 >60 mL/min/1.73m2    Calcium 9.5 8.8 - 10.2 mg/dL    Chloride 96 (L) 98 - 107 mmol/L    Glucose 108 (H) 70 - 99 mg/dL    Alkaline Phosphatase 80 40 - 129 U/L    AST 27 0 - 45 U/L    ALT 24 0 - 70 U/L    Protein Total 7.2 6.4 - 8.3 g/dL    Albumin 4.4 3.5 - 5.2 g/dL    Bilirubin Total 0.8 <=1.2 mg/dL   CBC with platelets and differential     Status: Abnormal   Result Value Ref Range    WBC Count 5.4 4.0 - 11.0 10e3/uL    RBC Count 4.70 4.40 - 5.90 10e6/uL    Hemoglobin 16.0 13.3 - 17.7 g/dL    Hematocrit 44.6 40.0 - 53.0 %    MCV 95 78 - 100 fL    MCH 34.0 (H) 26.5 - 33.0 pg    MCHC 35.9 31.5 - 36.5 g/dL    RDW 13.1 10.0 - 15.0 %    Platelet Count 175 150 - 450 10e3/uL    % Neutrophils 62 %    % Lymphocytes 20 %    % Monocytes 14 %    % Eosinophils 2 %    % Basophils 1 %    % Immature Granulocytes 1 %    NRBCs per 100 WBC 0 <1 /100    Absolute Neutrophils 3.4 1.6 - 8.3 10e3/uL    Absolute Lymphocytes 1.1 0.8 - 5.3 10e3/uL    Absolute Monocytes 0.8 0.0 - 1.3 10e3/uL    Absolute Eosinophils 0.1 0.0 - 0.7 10e3/uL    Absolute Basophils 0.0 0.0 - 0.2 10e3/uL    Absolute Immature Granulocytes 0.0 <=0.4 10e3/uL    Absolute NRBCs 0.0 10e3/uL   CBC and Differential     Status: Abnormal    Narrative    The following orders were created for panel order CBC and Differential.  Procedure       "                         Abnormality         Status                     ---------                               -----------         ------                     CBC with platelets and d...[[id number]]  Abnormal            Final result                 Please view results for these tests on the individual orders.       ASSESSMENT / PLAN:       ICD-10-CM    1. Encounter for Medicare annual wellness exam  Z00.00       2. Essential hypertension  I10 Comprehensive Metabolic Panel     lisinopril-hydrochlorothiazide (ZESTORETIC) 20-25 MG tablet     Comprehensive Metabolic Panel      3. Gastroesophageal reflux disease without esophagitis  K21.9 omeprazole (PRILOSEC) 20 MG DR capsule      4. Lipid screening  Z13.220 Lipid Panel     Lipid Panel      5. Screening for prostate cancer  Z12.5 PSA Screen GH     PSA Screen GH      6. Rheumatoid arthritis of multiple sites without rheumatoid factor (H)  M06.09         Hypertension under good control. CMP updated, refilled meds x1 year.   GERD controlled with omeprazole-refilled x1 year  Lipid screening updated  PSA updated  RA of hands, knees, hips, feet. Methotrexate working well along with meloxicam daily. Refilled meds x1 year. Labs updated for monitoring today. Continue with folic acid supplement.   Politely declines any immunizations today  Colonoscopy due summer 2024  Follow up in 1 year.     Patient has been advised of split billing requirements and indicates understanding: Yes      COUNSELING:  Reviewed preventive health counseling, as reflected in patient instructions       Regular exercise       Healthy diet/nutrition       Vision screening       Folic Acid       Colon cancer screening      BMI:   Estimated body mass index is 28.65 kg/m  as calculated from the following:    Height as of this encounter: 1.84 m (6' 0.44\").    Weight as of this encounter: 97 kg (213 lb 12.8 oz).   Weight management plan: Discussed healthy diet and exercise guidelines      He reports that he has " never smoked. He has never used smokeless tobacco.      Appropriate preventive services were discussed with this patient, including applicable screening as appropriate for fall prevention, nutrition, physical activity, Tobacco-use cessation, weight loss and cognition.  Checklist reviewing preventive services available has been given to the patient.    Reviewed patients plan of care and provided an AVS. The Basic Care Plan (routine screening as documented in Health Maintenance) for Wicho meets the Care Plan requirement. This Care Plan has been established and reviewed with the Patient.          NIELS Chavez [facility] AND [facility]    Identified Health Risks:  I have reviewed Opioid Use Disorder and Substance Use Disorder risk factors and made any needed referrals.

## 2023-11-03 NOTE — PROGRESS NOTES
He is at risk for lack of exercise and has been provided with information to increase physical activity for the benefit of his well-being.  The patient was counseled and encouraged to consider modifying their diet and eating habits. He was provided with information on recommended healthy diet options.  The patient was provided with written information regarding signs of hearing loss.

## 2023-11-03 NOTE — NURSING NOTE
Patient presents today for annual medicare wellness visit.    Medication Reconciliation Complete    Kendal Escobedo LPN  11/3/2023 12:47 PM

## 2023-11-03 NOTE — PATIENT INSTRUCTIONS
"Patient Education   Personalized Prevention Plan  You are due for the preventive services outlined below.  Your care team is available to assist you in scheduling these services.  If you have already completed any of these items, please share that information with your care team to update in your medical record.  Health Maintenance Due   Topic Date Due     RSV VACCINE (Pregnancy & 60+) (1 - 1-dose 60+ series) Never done     COVID-19 Vaccine (4 - 2023-24 season) 09/01/2023     AORTIC ANEURYSM SCREENING (SYSTEM ASSIGNED)  Never done     Annual Wellness Visit  10/06/2023     Learning About Being Physically Active  What is physical activity?     Being physically active means doing any kind of activity that gets your body moving.  The types of physical activity that can help you get fit and stay healthy include:  Aerobic or \"cardio\" activities. These make your heart beat faster and make you breathe harder, such as brisk walking, riding a bike, or running. They strengthen your heart and lungs and build up your endurance.  Strength training activities. These make your muscles work against, or \"resist,\" something. Examples include lifting weights or doing push-ups. These activities help tone and strengthen your muscles and bones.  Stretches. These let you move your joints and muscles through their full range of motion. Stretching helps you be more flexible.  Reaching a balance between these three types of physical activity is important because each one contributes to your overall fitness.  What are the benefits of being active?  Being active is one of the best things you can do for your health. It helps you to:  Feel stronger and have more energy to do all the things you like to do.  Focus better at school or work.  Feel, think, and sleep better.  Reach and stay at a healthy weight.  Lose fat and build lean muscle.  Lower your risk for serious health problems, including diabetes, heart attack, high blood pressure, and some " "cancers.  Keep your heart, lungs, bones, muscles, and joints strong and healthy.  How can you make being active part of your life?  Start slowly. Make it your long-term goal to get at least 30 minutes of exercise on most days of the week. Walking is a good choice. You also may want to do other activities, such as running, swimming, cycling, or playing tennis or team sports.  Pick activities that you like--ones that make your heart beat faster, your muscles stronger, and your muscles and joints more flexible. If you find more than one thing you like doing, do them all. You don't have to do the same thing every day.  Get your heart pumping every day. Any activity that makes your heart beat faster and keeps it at that rate for a while counts.  Here are some great ways to get your heart beating faster:  Go for a brisk walk, run, or bike ride.  Go for a hike or swim.  Go in-line skating.  Play a game of touch football, basketball, or soccer.  Ride a bike.  Play tennis or racquetball.  Climb stairs.  Even some household chores can be aerobic--just do them at a faster pace. Vacuuming, raking or mowing the lawn, sweeping the garage, and washing and waxing the car all can help get your heart rate up.  Strengthen your muscles during the week. You don't have to lift heavy weights or grow big, bulky muscles to get stronger. Doing a few simple activities that make your muscles work against, or \"resist,\" something can help you get stronger.  For example, you can:  Do push-ups or sit-ups, which use your own body weight as resistance.  Lift weights or dumbbells or use stretch bands at home or in a gym or community center.  Stretch your muscles often. Stretching will help you as you become more active. It can help you stay flexible, loosen tight muscles, and avoid injury. It can also help improve your balance and posture and can be a great way to relax.  Be sure to stretch the muscles you'll be using when you work out. It's best to " "warm your muscles slightly before you stretch them. Walk or do some other light aerobic activity for a few minutes, and then start stretching.  When you stretch your muscles:  Do it slowly. Stretching is not about going fast or making sudden movements.  Don't push or bounce during a stretch.  Hold each stretch for at least 15 to 30 seconds, if you can. You should feel a stretch in the muscle, but not pain.  Breathe out as you do the stretch. Then breathe in as you hold the stretch. Don't hold your breath.  If you're worried about how more activity might affect your health, have a checkup before you start. Follow any special advice your doctor gives you for getting a smart start.  Where can you learn more?  Go to https://www.[employer].net/patiented  Enter W332 in the search box to learn more about \"Learning About Being Physically Active.\"  Current as of: October 10, 2022               Content Version: 13.7    1708-6708 [employer].   Care instructions adapted under license by your healthcare professional. If you have questions about a medical condition or this instruction, always ask your healthcare professional. [employer] disclaims any warranty or liability for your use of this information.      Learning About Dietary Guidelines  What are the Dietary Guidelines for Americans?     Dietary Guidelines for Americans provide tips for eating well and staying healthy. This helps reduce the risk for long-term (chronic) diseases.  These guidelines recommend that you:  Eat and drink the right amount for you. The U.[place] government's food guide is called MyPlate. It can help you make your own well-balanced eating plan.  Try to balance your eating with your activity. This helps you stay at a healthy weight.  Drink alcohol in moderation, if at all.  Limit foods high in salt, saturated fat, trans fat, and added sugar.  These guidelines are from the U.S. Department of Agriculture and the U.S. Department " "of Health and Human Services. They are updated every 5 years.  What is MyPlate?  MyPlate is the U.[place] government's food guide. It can help you make your own well-balanced eating plan. A balanced eating plan means that you eat enough, but not too much, and that your food gives you the nutrients you need to stay healthy.  MyPlate focuses on eating plenty of whole grains, fruits, and vegetables, and on limiting fat and sugar. It is available online at www.ChooseMyPlate.gov.  How can you get started?  If you're trying to eat healthier, you can slowly change your eating habits over time. You don't have to make big changes all at once. Start by adding one or two healthy foods to your meals each day.  Grains  Choose whole-grain breads and cereals and whole-wheat pasta and whole-grain crackers.  Vegetables  Eat a variety of vegetables every day. They have lots of nutrients and are part of a heart-healthy diet.  Fruits  Eat a variety of fruits every day. Fruits contain lots of nutrients. Choose fresh fruit instead of fruit juice.  Protein foods  Choose fish and lean poultry more often. Eat red meat and fried meats less often. Dried beans, tofu, and nuts are also good sources of protein.  Dairy  Choose low-fat or fat-free products from this food group. If you have problems digesting milk, try eating cheese or yogurt instead.  Fats and oils  Limit fats and oils if you're trying to cut calories. Choose healthy fats when you cook. These include canola oil and olive oil.  Where can you learn more?  Go to https://www.[employer].net/patiented  Enter D676 in the search box to learn more about \"Learning About Dietary Guidelines.\"  Current as of: March 1, 2023               Content Version: 13.7    8129-9196 [employer].   Care instructions adapted under license by your healthcare professional. If you have questions about a medical condition or this instruction, always ask your healthcare professional. [employer], " Incorporated disclaims any warranty or liability for your use of this information.      Hearing Loss: Care Instructions  Overview     Hearing loss is a sudden or slow decrease in how well you hear. It can range from slight to profound. Permanent hearing loss can occur with aging. It also can happen when you are exposed long-term to loud noise. Examples include listening to loud music, riding motorcycles, or being around other loud machines.  Hearing loss can affect your work and home life. It can make you feel lonely or depressed. You may feel that you have lost your independence. But hearing aids and other devices can help you hear better and feel connected to others.  Follow-up care is a key part of your treatment and safety. Be sure to make and go to all appointments, and call your doctor if you are having problems. It's also a good idea to know your test results and keep a list of the medicines you take.  How can you care for yourself at home?  Avoid loud noises whenever possible. This helps keep your hearing from getting worse.  Always wear hearing protection around loud noises.  Wear a hearing aid as directed.  A professional can help you pick a hearing aid that will work best for you.  You can also get hearing aids over the counter for mild to moderate hearing loss.  Have hearing tests as your doctor suggests. They can show whether your hearing has changed. Your hearing aid may need to be adjusted.  Use other devices as needed. These may include:  Telephone amplifiers and hearing aids that can connect to a television, stereo, radio, or microphone.  Devices that use lights or vibrations. These alert you to the doorbell, a ringing telephone, or a baby monitor.  Television closed-captioning. This shows the words at the bottom of the screen. Most new TVs can do this.  TTY (text telephone). This lets you type messages back and forth on the telephone instead of talking or listening. These devices are also called  "TDD. When messages are typed on the keyboard, they are sent over the phone line to a receiving TTY. The message is shown on a monitor.  Use text messaging, social media, and email if it is hard for you to communicate by telephone.  Try to learn a listening technique called speechreading. It is not lipreading. You pay attention to people's gestures, expressions, posture, and tone of voice. These clues can help you understand what a person is saying. Face the person you are talking to, and have them face you. Make sure the lighting is good. You need to see the other person's face clearly.  Think about counseling if you need help to adjust to your hearing loss.  When should you call for help?  Watch closely for changes in your health, and be sure to contact your doctor if:    You think your hearing is getting worse.     You have new symptoms, such as dizziness or nausea.   Where can you learn more?  Go to https://www.[employer].net/patiented  Enter R798 in the search box to learn more about \"Hearing Loss: Care Instructions.\"  Current as of: March 1, 2023               Content Version: 13.7    7167-7286 [employer].   Care instructions adapted under license by your healthcare professional. If you have questions about a medical condition or this instruction, always ask your healthcare professional. [employer] disclaims any warranty or liability for your use of this information.         "

## 2023-12-05 ENCOUNTER — TELEPHONE (OUTPATIENT)
Dept: UROLOGY | Facility: OTHER | Age: 63
End: 2023-12-05

## 2023-12-05 DIAGNOSIS — R97.20 ELEVATED PROSTATE SPECIFIC ANTIGEN (PSA): Primary | ICD-10-CM

## 2023-12-06 ENCOUNTER — OFFICE VISIT (OUTPATIENT)
Dept: UROLOGY | Facility: OTHER | Age: 63
End: 2023-12-06
Attending: NURSE PRACTITIONER
Payer: MEDICARE

## 2023-12-06 ENCOUNTER — LAB (OUTPATIENT)
Dept: LAB | Facility: OTHER | Age: 63
End: 2023-12-06
Attending: UROLOGY
Payer: MEDICARE

## 2023-12-06 VITALS
TEMPERATURE: 98.2 F | WEIGHT: 210 LBS | BODY MASS INDEX: 28.14 KG/M2 | DIASTOLIC BLOOD PRESSURE: 84 MMHG | OXYGEN SATURATION: 96 % | RESPIRATION RATE: 16 BRPM | HEART RATE: 109 BPM | SYSTOLIC BLOOD PRESSURE: 126 MMHG

## 2023-12-06 DIAGNOSIS — N40.0 BPH WITHOUT OBSTRUCTION/LOWER URINARY TRACT SYMPTOMS: ICD-10-CM

## 2023-12-06 DIAGNOSIS — R97.20 ELEVATED PROSTATE SPECIFIC ANTIGEN (PSA): ICD-10-CM

## 2023-12-06 DIAGNOSIS — R97.20 ELEVATED PROSTATE SPECIFIC ANTIGEN (PSA): Primary | ICD-10-CM

## 2023-12-06 LAB
ALBUMIN UR-MCNC: NEGATIVE MG/DL
ANION GAP SERPL CALCULATED.3IONS-SCNC: 10 MMOL/L (ref 7–15)
APPEARANCE UR: CLEAR
BILIRUB UR QL STRIP: NEGATIVE
BUN SERPL-MCNC: 9.3 MG/DL (ref 8–23)
CALCIUM SERPL-MCNC: 9.7 MG/DL (ref 8.8–10.2)
CHLORIDE SERPL-SCNC: 92 MMOL/L (ref 98–107)
COLOR UR AUTO: YELLOW
CREAT SERPL-MCNC: 1.14 MG/DL (ref 0.67–1.17)
DEPRECATED HCO3 PLAS-SCNC: 27 MMOL/L (ref 22–29)
EGFRCR SERPLBLD CKD-EPI 2021: 71 ML/MIN/1.73M2
GLUCOSE SERPL-MCNC: 115 MG/DL (ref 70–99)
GLUCOSE UR STRIP-MCNC: NEGATIVE MG/DL
HGB UR QL STRIP: NEGATIVE
KETONES UR STRIP-MCNC: NEGATIVE MG/DL
LEUKOCYTE ESTERASE UR QL STRIP: ABNORMAL
Lab: NORMAL
NITRATE UR QL: NEGATIVE
PERFORMING LABORATORY: NORMAL
PH UR STRIP: 7 [PH] (ref 5–9)
POTASSIUM SERPL-SCNC: 4.2 MMOL/L (ref 3.4–5.3)
SODIUM SERPL-SCNC: 129 MMOL/L (ref 135–145)
SP GR UR STRIP: 1.01 (ref 1–1.03)
SPECIMEN STATUS: NORMAL
TEST NAME: NORMAL
UROBILINOGEN UR STRIP-MCNC: NORMAL MG/DL

## 2023-12-06 PROCEDURE — 81003 URINALYSIS AUTO W/O SCOPE: CPT | Mod: ZL

## 2023-12-06 PROCEDURE — 84999 UNLISTED CHEMISTRY PROCEDURE: CPT | Mod: ZL

## 2023-12-06 PROCEDURE — G0463 HOSPITAL OUTPT CLINIC VISIT: HCPCS | Mod: 25

## 2023-12-06 PROCEDURE — 99203 OFFICE O/P NEW LOW 30 MIN: CPT | Performed by: UROLOGY

## 2023-12-06 PROCEDURE — G0463 HOSPITAL OUTPT CLINIC VISIT: HCPCS

## 2023-12-06 PROCEDURE — 36415 COLL VENOUS BLD VENIPUNCTURE: CPT | Mod: ZL | Performed by: UROLOGY

## 2023-12-06 PROCEDURE — 87081 CULTURE SCREEN ONLY: CPT | Mod: ZL

## 2023-12-06 PROCEDURE — 80048 BASIC METABOLIC PNL TOTAL CA: CPT | Mod: ZL | Performed by: UROLOGY

## 2023-12-06 PROCEDURE — 51798 US URINE CAPACITY MEASURE: CPT | Performed by: UROLOGY

## 2023-12-06 ASSESSMENT — PAIN SCALES - GENERAL: PAINLEVEL: NO PAIN (0)

## 2023-12-06 NOTE — PROGRESS NOTES
It was my pleasure to meet Mr. Wicho Hawk, a 63 year old year old male seen in consultation today for Chief Complaint: Elevated PSA  .    HPI: Mr. Wicho Hawk is a 63 year old year old male with a history of GERD, hyperlipidemia, hypertension, and RA who presents today December 6, 2023 for evaluation of an elevated PSA recently 7.32 in early November 2023.  PSA has been elevated for the last several consecutive years.  He has not been seen by us in the past.    Here today 12/6/2023 for new patient evaluation.  Reports nocturia x1-2 but he will drink an occasional alcoholic beverage.  2 cups of coffee in the morning.  No frequency no urgency.  Stream is weak but appropriate and adequate.    No history of UTI or gross hematuria.  Denies family history of prostate cancer.  Takes no medicine for his prostate.    Past Medical History:   Diagnosis Date    Equivocal stress test     Cardiolyte    Essential (primary) hypertension     No Comments Provided    Gastro-esophageal reflux disease without esophagitis     No Comments Provided    Hyperlipidemia     No Comments Provided    Male erectile dysfunction     7/2/2014    Rheumatoid arthritis (H)     No Comments Provided    Tubular adenoma        Past Surgical History:   Procedure Laterality Date    ARTHROSCOPY KNEE Left     No Comments Provided    ARTHROSCOPY SHOULDER Left     08/2011    ARTHROTOMY WRIST Left     Wrist surgery with fusion.    COLONOSCOPY  05/01/2009 5/01/2009,Normal screening colonoscopy, follow up recommended in 10 years    COLONOSCOPY N/A 7/26/2019    tubular adenoma, 5 year follow up    ELBOW SURGERY Right     Right elbow surgery for bone spurs/chips    ESOPHAGOSCOPY, GASTROSCOPY, DUODENOSCOPY (EGD), COMBINED      12/2015,[facility]    VASECTOMY      No Comments Provided       FAMILY HISTORY: Denies a family history of prostate cancer.      SOCIAL HISTORY:    reports that he has never smoked. He has never used smokeless tobacco.    Current  "Outpatient Medications   Medication Sig Dispense Refill    aspirin (ASA) 81 MG tablet Take 1 tablet (81 mg) by mouth daily      folic acid (FOLVITE) 1 MG tablet Take 1 tablet (1,000 mcg) by mouth daily 90 tablet 3    lisinopril-hydrochlorothiazide (ZESTORETIC) 20-25 MG tablet Take 1 tablet by mouth daily 90 tablet 0    meloxicam (MOBIC) 7.5 MG tablet Take 1 tablet (7.5 mg) by mouth daily 90 tablet 4    methotrexate 2.5 MG tablet Take 6 tablets (15 mg) by mouth once a week 72 tablet 4    omeprazole (PRILOSEC) 20 MG DR capsule Take 1 capsule (20 mg) by mouth daily 90 capsule 4       ALLERGIES: Patient has no known allergies.      REVIEW OF SYSTEMS:  Skin: negative  Eyes: negative  Ears/Nose/Throat: negative  Respiratory: No shortness of breath, dyspnea on exertion, cough, or hemoptysis  Cardiovascular: negative  Gastrointestinal: negative  Genitourinary: elevated PSA, waking through the night to void   Musculoskeletal: negative  Neurologic: negative  Psychiatric: negative  Hematologic/Lymphatic/Immunologic: negative  Endocrine: negative  Lacy Gu LPN  12/6/2023 7:59 AM          GENERAL PHYSICAL EXAM:   Vitals:     Initial /84   Pulse 109   Temp 98.2  F (36.8  C) (Temporal)   Resp 16   Wt 95.3 kg (210 lb)   SpO2 96%   BMI 28.14 kg/m   Estimated body mass index is 28.14 kg/m  as calculated from the following:    Height as of 11/3/23: 1.84 m (6' 0.44\").    Weight as of this encounter: 95.3 kg (210 lb).        GENERAL: Well groomed, well developed, well nourished male in NAD.  HEAD: Normocephalic.   NECK: Neck supple  ENT: No jaundice   CV: Warm extremities  RESPIRATORY: Normal respiratory effort.   GI: Soft, NT, ND, no palpable masses.   MS: Normal gait   SKIN: Warm to touch, dry.    NEURO: Alert and oriented x 3.  PSYCH: Normal mood and affect, pleasant and agreeable during interview and exam.     :  Prostate: Greater than 60 g, bulbous, high riding but smooth, no obvious nodules    PVR: Residual urine " by ultrasound was 40 ml.      RADIOLOGY: The following tests were reviewed: No x-rays to review    LABS: The last test results for Ms. Wicho Hawk were reviewed.   No results found for this or any previous visit (from the past 24 hour(s)).    PSA -   Lab Results   Component Value Date    PSA 7.32 11/03/2023    PSA 5.26 10/06/2022    PSA 4.84 09/27/2021     BMP -   Recent Labs   Lab Test 11/03/23  1301 10/06/22  1119 09/27/21  1635   * 129* 129*   POTASSIUM 4.6 4.0 4.0   CHLORIDE 96* 94* 95*   CO2 26 31 30   BUN 13.2 15 16   CR 1.20* 1.15 1.33*   * 105 101   ARIEL 9.5 9.5 9.2       CBC -   Recent Labs   Lab Test 11/03/23  1301 10/06/22  1119 09/27/21  1635   WBC 5.4 4.5 4.9   HGB 16.0 15.6 15.1    180 197       ASSESSMENT:   Elevated PSA    PLAN:   Current PSA Value: 7.32.  CHATA demonstrates a very large, bulbous yet smooth prostate.    Discussion with patient about PSA, normal ranges, and possible etiologies of its elevation including prostate cancer, BPH or enlargement of the prostate, infection, lab error, chronic inflammation and obstructive urinary symptoms with retention.    Options discussed including watchful waiting, repeat PSA, prostate biopsy with risks including bleeding, infection, erectile dysfunction, retention of urine, UTI and sepsis, molecular testing with PCA3 or [facility] Mdx to determine whether a biopsy would be warranted, 4K score and the use of % free and total PSA as a barometer for decision making.  Also discussed possible role of MRI of the prostate looking for abnormalities/cancer in the prostate and MRI Fusion Biopsy.     Questions were answered and patient voiced an understanding.      He has agreed to MRI of the prostate first with follow-up to discuss.  If the MRI is abnormal or I recommend a biopsy then he will consider it.    The risk of biopsy was discussed including bleeding, infection, sepsis, retention, ED and injury..     Rectal swab done.      35 minutes spent  on the date of this encounter doing chart review, history and exam, documentation and further activities as noted above.          Alexander Wong MD   [facility]

## 2023-12-06 NOTE — NURSING NOTE
Patient presents to clinic for concern of elevated PSA  Post-Void Residual  A post-void residual was measured by ultrasonic bladder scanner.  40 mL  Review Of Systems  Skin: negative  Eyes: negative  Ears/Nose/Throat: negative  Respiratory: No shortness of breath, dyspnea on exertion, cough, or hemoptysis  Cardiovascular: negative  Gastrointestinal: negative  Genitourinary: elevated PSA, waking through the night to void   Musculoskeletal: negative  Neurologic: negative  Psychiatric: negative  Hematologic/Lymphatic/Immunologic: negative  Endocrine: negative  Lacy Gu LPN  12/6/2023 7:59 AM

## 2023-12-10 LAB — MISCELLANEOUS TEST 1 (ARUP): NORMAL

## 2023-12-15 ENCOUNTER — HOSPITAL ENCOUNTER (OUTPATIENT)
Dept: MRI IMAGING | Facility: OTHER | Age: 63
Discharge: HOME | End: 2023-12-15
Attending: UROLOGY | Admitting: UROLOGY
Payer: MEDICARE

## 2023-12-15 DIAGNOSIS — R97.20 ELEVATED PROSTATE SPECIFIC ANTIGEN (PSA): ICD-10-CM

## 2023-12-15 PROCEDURE — 76377 3D RENDER W/INTRP POSTPROCES: CPT

## 2023-12-15 PROCEDURE — A9575 INJ GADOTERATE MEGLUMI 0.1ML: HCPCS | Mod: JZ | Performed by: UROLOGY

## 2023-12-15 PROCEDURE — G1010 CDSM STANSON: HCPCS

## 2023-12-15 PROCEDURE — 255N000002 HC RX 255 OP 636: Mod: JZ | Performed by: UROLOGY

## 2023-12-15 RX ADMIN — GADOTERATE MEGLUMINE 20 ML: 376.9 INJECTION INTRAVENOUS at 07:35

## 2023-12-18 NOTE — RESULT ENCOUNTER NOTE
Anoop, any chance you can tu this lesion for me so I can see if I can reach it transrectally.  I think I see the area on T2 and on the early DWI images but not sure.      Let me know and thanks, Souleymane

## 2023-12-18 NOTE — RESULT ENCOUNTER NOTE
Pirads 4 lesion needing biopsy.  Will recommend biopsy.  Scheduled to see me 1/15/24.  Message left and mychart message sent as patient did not answer phone.  Dr. Wong

## 2023-12-19 ENCOUNTER — TELEPHONE (OUTPATIENT)
Dept: UROLOGY | Facility: OTHER | Age: 63
End: 2023-12-19
Payer: COMMERCIAL

## 2023-12-19 DIAGNOSIS — N40.1 BENIGN PROSTATIC HYPERPLASIA WITH URINARY FREQUENCY: Primary | ICD-10-CM

## 2023-12-19 DIAGNOSIS — R30.0 DYSURIA: ICD-10-CM

## 2023-12-19 DIAGNOSIS — R35.0 BENIGN PROSTATIC HYPERPLASIA WITH URINARY FREQUENCY: Primary | ICD-10-CM

## 2023-12-19 DIAGNOSIS — R35.0 URINARY FREQUENCY: ICD-10-CM

## 2023-12-19 RX ORDER — TAMSULOSIN HYDROCHLORIDE 0.4 MG/1
0.4 CAPSULE ORAL DAILY
Qty: 30 CAPSULE | Refills: 11 | Status: SHIPPED | OUTPATIENT
Start: 2023-12-19

## 2023-12-19 NOTE — TELEPHONE ENCOUNTER
Patient called stating he missed a call yesterday from Dr Wong. He would like to know the results of his MRI.   Please contact patient.    Nayeli Aguila on 12/19/2023 at 11:04 AM

## 2023-12-20 ENCOUNTER — LAB (OUTPATIENT)
Dept: LAB | Facility: OTHER | Age: 63
End: 2023-12-20
Attending: UROLOGY
Payer: MEDICARE

## 2023-12-20 DIAGNOSIS — Z85.51 PERSONAL HISTORY OF MALIGNANT NEOPLASM OF BLADDER: ICD-10-CM

## 2023-12-20 DIAGNOSIS — Z85.51 PERSONAL HISTORY OF MALIGNANT NEOPLASM OF BLADDER: Primary | ICD-10-CM

## 2023-12-20 LAB
ALBUMIN UR-MCNC: 20 MG/DL
APPEARANCE UR: ABNORMAL
BILIRUB UR QL STRIP: NEGATIVE
COLOR UR AUTO: YELLOW
GLUCOSE UR STRIP-MCNC: NEGATIVE MG/DL
HGB UR QL STRIP: ABNORMAL
KETONES UR STRIP-MCNC: NEGATIVE MG/DL
LEUKOCYTE ESTERASE UR QL STRIP: ABNORMAL
NITRATE UR QL: POSITIVE
PH UR STRIP: 6.5 [PH] (ref 5–9)
SP GR UR STRIP: 1.02 (ref 1–1.03)
UROBILINOGEN UR STRIP-MCNC: NORMAL MG/DL

## 2023-12-20 PROCEDURE — 81003 URINALYSIS AUTO W/O SCOPE: CPT | Mod: ZL

## 2023-12-20 PROCEDURE — 81001 URINALYSIS AUTO W/SCOPE: CPT | Mod: ZL

## 2023-12-20 PROCEDURE — 87086 URINE CULTURE/COLONY COUNT: CPT | Mod: ZL

## 2023-12-21 ENCOUNTER — TELEPHONE (OUTPATIENT)
Dept: UROLOGY | Facility: OTHER | Age: 63
End: 2023-12-21
Payer: COMMERCIAL

## 2023-12-21 DIAGNOSIS — Z85.51 PERSONAL HISTORY OF MALIGNANT NEOPLASM OF BLADDER: Primary | ICD-10-CM

## 2023-12-21 DIAGNOSIS — N30.00 ACUTE CYSTITIS WITHOUT HEMATURIA: Primary | ICD-10-CM

## 2023-12-21 LAB
ALBUMIN UR-MCNC: 20 MG/DL
APPEARANCE UR: ABNORMAL
BILIRUB UR QL STRIP: NEGATIVE
COLOR UR AUTO: YELLOW
GLUCOSE UR STRIP-MCNC: NEGATIVE MG/DL
HGB UR QL STRIP: ABNORMAL
KETONES UR STRIP-MCNC: NEGATIVE MG/DL
LEUKOCYTE ESTERASE UR QL STRIP: ABNORMAL
NITRATE UR QL: POSITIVE
PH UR STRIP: 6.5 [PH] (ref 5–9)
RBC URINE: 3 /HPF
SP GR UR STRIP: 1.02 (ref 1–1.03)
UROBILINOGEN UR STRIP-MCNC: NORMAL MG/DL
WBC URINE: 33 /HPF

## 2023-12-21 RX ORDER — SULFAMETHOXAZOLE/TRIMETHOPRIM 800-160 MG
1 TABLET ORAL 2 TIMES DAILY
Qty: 14 TABLET | Refills: 0 | Status: SHIPPED | OUTPATIENT
Start: 2023-12-21 | End: 2024-05-22

## 2023-12-21 NOTE — PROGRESS NOTES
Patient complaining of dysuria.  UA suspicious for UTI.  Culture pending.  Will start bactrim DS.  Aware of concern with methotrexate but given his risk and given he only takes methotrexate once a week we will still Rx.  Patient's wife aware.  Patient asked to go to ER if he fails to improve.  Dr. Wong

## 2023-12-21 NOTE — TELEPHONE ENCOUNTER
Called [facility], and spoke with Hien, after verifying Pt's last name and . She states typically, if a patient is on Methotrexate, they will only be put on Bactrim 3 times per week. Prescription written for twice daily for 7 days. She states the Bactrim displaces the Methotrexate, increasing amount in body and in turn may cause increased side effects. She was informed of the following:    Warnings Override History for sulfamethoxazole-trimethoprim (BACTRIM DS) 800-160 MG tablet [[id number]]    Overridden by Alexander Wong MD on Dec 21, 2023 4:38 PM   Drug-Drug   1. METHOTREXATE / SULFONAMIDES [Level: Major] [Reason: Benefit outweighs risk]   Other Orders: methotrexate 2.5 MG tablet        Progress note dated 23:  Patient complaining of dysuria.  UA suspicious for UTI.  Culture pending. Will start bactrim DS.  Aware of concern with methotrexate but given his risk and given he only takes methotrexate once a week we will still Rx. Patient's wife aware.  Patient asked to go to ER if he fails to improve.  Dr. Marvin Stanford also noted that during phone call, she received a message from Pt's wife that Pt was instructed to hold tomorrow's dose of Methotrexate. Pt takes one per week. She felt comfortable prescribing medication, with the above information.     Tamia Lucas RN .............. 2023  5:05 PM

## 2023-12-21 NOTE — TELEPHONE ENCOUNTER
Drug interaction with methotrexate and the Bactrim. Please call. Patient is in store.    Kaela Cruz on 12/21/2023 at 4:50 PM

## 2023-12-21 NOTE — RESULT ENCOUNTER NOTE
Patient aware.  Will send for culture.  Started on Bactrim DS.  Asked patient to hold methotrexate until after Bactrim DS finished.  Dr. Wong

## 2023-12-22 NOTE — RESULT ENCOUNTER NOTE
Patient aware of MRI results.  Plan prostate biopsy but will need new rectal swab given recent antibiotics for UTI.  Will see patient 1/15/24. Need Radiology to outline lesion on MRI.  Dr. Wong

## 2023-12-23 LAB — BACTERIA UR CULT: ABNORMAL

## 2024-01-13 DIAGNOSIS — N30.00 ACUTE CYSTITIS WITHOUT HEMATURIA: ICD-10-CM

## 2024-01-15 ENCOUNTER — OFFICE VISIT (OUTPATIENT)
Dept: UROLOGY | Facility: OTHER | Age: 64
End: 2024-01-15
Attending: UROLOGY
Payer: COMMERCIAL

## 2024-01-15 VITALS
OXYGEN SATURATION: 97 % | DIASTOLIC BLOOD PRESSURE: 78 MMHG | TEMPERATURE: 97.7 F | SYSTOLIC BLOOD PRESSURE: 126 MMHG | HEIGHT: 72 IN | BODY MASS INDEX: 28.58 KG/M2 | WEIGHT: 211 LBS | HEART RATE: 88 BPM

## 2024-01-15 DIAGNOSIS — R97.20 ELEVATED PROSTATE SPECIFIC ANTIGEN (PSA): Primary | ICD-10-CM

## 2024-01-15 DIAGNOSIS — N30.00 ACUTE CYSTITIS WITHOUT HEMATURIA: ICD-10-CM

## 2024-01-15 DIAGNOSIS — R93.5 ABNORMAL MRI, PELVIS: ICD-10-CM

## 2024-01-15 DIAGNOSIS — N40.0 BPH WITHOUT OBSTRUCTION/LOWER URINARY TRACT SYMPTOMS: ICD-10-CM

## 2024-01-15 LAB
ALBUMIN UR-MCNC: NEGATIVE MG/DL
APPEARANCE UR: ABNORMAL
BACTERIA #/AREA URNS HPF: ABNORMAL /HPF
BILIRUB UR QL STRIP: ABNORMAL
COLOR UR AUTO: ABNORMAL
GLUCOSE UR STRIP-MCNC: NEGATIVE MG/DL
HGB UR QL STRIP: NEGATIVE
KETONES UR STRIP-MCNC: NEGATIVE MG/DL
LEUKOCYTE ESTERASE UR QL STRIP: ABNORMAL
NITRATE UR QL: POSITIVE
PH UR STRIP: 6.5 [PH] (ref 5–9)
RBC URINE: 2 /HPF
SP GR UR STRIP: 1.02 (ref 1–1.03)
UROBILINOGEN UR STRIP-MCNC: 6 MG/DL
WBC URINE: >182 /HPF

## 2024-01-15 PROCEDURE — 87186 SC STD MICRODIL/AGAR DIL: CPT | Mod: ZL | Performed by: UROLOGY

## 2024-01-15 PROCEDURE — 99214 OFFICE O/P EST MOD 30 MIN: CPT | Performed by: UROLOGY

## 2024-01-15 PROCEDURE — 81001 URINALYSIS AUTO W/SCOPE: CPT | Mod: ZL | Performed by: UROLOGY

## 2024-01-15 PROCEDURE — 87086 URINE CULTURE/COLONY COUNT: CPT | Mod: ZL | Performed by: UROLOGY

## 2024-01-15 PROCEDURE — 51798 US URINE CAPACITY MEASURE: CPT | Performed by: UROLOGY

## 2024-01-15 PROCEDURE — G0463 HOSPITAL OUTPT CLINIC VISIT: HCPCS

## 2024-01-15 PROCEDURE — G0463 HOSPITAL OUTPT CLINIC VISIT: HCPCS | Mod: 25

## 2024-01-15 RX ORDER — SULFAMETHOXAZOLE/TRIMETHOPRIM 800-160 MG
1 TABLET ORAL 2 TIMES DAILY
Qty: 14 TABLET | Refills: 1 | Status: SHIPPED | OUTPATIENT
Start: 2024-01-15 | End: 2024-05-22

## 2024-01-15 ASSESSMENT — PAIN SCALES - GENERAL: PAINLEVEL: NO PAIN (0)

## 2024-01-15 NOTE — NURSING NOTE
"Chief Complaint   Patient presents with    Follow Up     Go over MRI results      post void residual  Review Of Systems  Skin: negative  Eyes: negative  Ears/Nose/Throat: negative  Respiratory: No shortness of breath, dyspnea on exertion, cough, or hemoptysis  Cardiovascular: negative  Gastrointestinal: negative  Genitourinary: negative  Musculoskeletal: negative  Neurologic: negative  Psychiatric: negative  Hematologic/Lymphatic/Immunologic: negative  Endocrine: negative    Initial /78   Pulse 88   Temp 97.7  F (36.5  C) (Temporal)   Ht 1.84 m (6' 0.44\")   Wt 95.7 kg (211 lb)   SpO2 97%   BMI 28.27 kg/m   Estimated body mass index is 28.27 kg/m  as calculated from the following:    Height as of this encounter: 1.84 m (6' 0.44\").    Weight as of this encounter: 95.7 kg (211 lb).  Medication Review: complete    The next two questions are to help us understand your food security.  If you are feeling you need any assistance in this area, we have resources available to support you today.          11/3/2023   SDOH- Food Insecurity   Within the past 12 months, did you worry that your food would run out before you got money to buy more? N   Within the past 12 months, did the food you bought just not last and you didn t have money to get more? N         Health Care Directive:  Patient does not have a Health Care Directive or Living Will: Discussed advance care planning with patient; however, patient declined at this time.    Christiana Stack, LPN      "

## 2024-01-15 NOTE — PROGRESS NOTES
Chief Complaint: Follow Up (Go over MRI results )  .    HPI: Mr. Wicho Hawk is a 63 year old year old male presenting today January 15, 2024 in follow up for evaluation of an elevated PSA with BPH.    Seen initially in December for an elevated PSA of 7.32.  I did a rectal exam which demonstrated a smooth yet enlarged prostate.  My recommendation was for an MRI.    Patient subsequently developed a UTI which he thought was from the prostate examination.  Culture grew Staph aureus for which he was treated with Bactrim per guidelines for 7 days with resolution of his symptoms.    He was initiated on tamsulosin secondary to his large prostate.  That has helped with less frequency and a stronger stream.    Patient is a referee for basketball and drinks maybe 3 glasses of water a day with some coffee in the morning.  He is definitely more dehydrated.  Complicating his picture is methotrexate for rheumatoid arthritis and the immunosuppression that comes with that.    Developed symptoms of a UTI last Thursday.  He is not on MyChart and tried to call various numbers but was unsuccessful.  Symptoms included burning with increased urgency and frequency and difficulty emptying.  No fevers no chills or blood.    Here today 1/15/2024 to discuss his MRI as well as his recent symptoms.  Culture was ordered for today.    Past Medical History:   Diagnosis Date    Equivocal stress test     Cardiolyte    Essential (primary) hypertension     No Comments Provided    Gastro-esophageal reflux disease without esophagitis     No Comments Provided    Hyperlipidemia     No Comments Provided    Male erectile dysfunction     7/2/2014    Rheumatoid arthritis (H)     No Comments Provided    Tubular adenoma        Past Surgical History:   Procedure Laterality Date    ARTHROSCOPY KNEE Left     No Comments Provided    ARTHROSCOPY SHOULDER Left     08/2011    ARTHROTOMY WRIST Left     Wrist surgery with fusion.    COLONOSCOPY  05/01/2009     "5/01/2009,Normal screening colonoscopy, follow up recommended in 10 years    COLONOSCOPY N/A 7/26/2019    tubular adenoma, 5 year follow up    ELBOW SURGERY Right     Right elbow surgery for bone spurs/chips    ESOPHAGOSCOPY, GASTROSCOPY, DUODENOSCOPY (EGD), COMBINED      12/2015,[place]    VASECTOMY      No Comments Provided       Current Outpatient Medications   Medication Sig Dispense Refill    aspirin (ASA) 81 MG tablet Take 1 tablet (81 mg) by mouth daily      folic acid (FOLVITE) 1 MG tablet Take 1 tablet (1,000 mcg) by mouth daily 90 tablet 3    lisinopril-hydrochlorothiazide (ZESTORETIC) 20-25 MG tablet Take 1 tablet by mouth daily 90 tablet 0    meloxicam (MOBIC) 7.5 MG tablet Take 1 tablet (7.5 mg) by mouth daily 90 tablet 4    methotrexate 2.5 MG tablet Take 6 tablets (15 mg) by mouth once a week 72 tablet 4    omeprazole (PRILOSEC) 20 MG DR capsule Take 1 capsule (20 mg) by mouth daily 90 capsule 4    sulfamethoxazole-trimethoprim (BACTRIM DS) 800-160 MG tablet Take 1 tablet by mouth 2 times daily 14 tablet 1    sulfamethoxazole-trimethoprim (BACTRIM DS) 800-160 MG tablet Take 1 tablet by mouth 2 times daily 14 tablet 0    tamsulosin (FLOMAX) 0.4 MG capsule Take 1 capsule (0.4 mg) by mouth daily Take after supper prior to bedtime.  Watch for dizziness upon standing 30 capsule 11       ALLERGIES: Patient has no known allergies.      REVIEW OF SYSTEMS:  Skin: negative  Eyes: negative  Ears/Nose/Throat: negative  Respiratory: No shortness of breath, dyspnea on exertion, cough, or hemoptysis  Cardiovascular: negative  Gastrointestinal: negative  Genitourinary: negative  Musculoskeletal: negative  Neurologic: negative  Psychiatric: negative  Hematologic/Lymphatic/Immunologic: negative  Endocrine: negative    GENERAL PHYSICAL EXAM:   Vitals: /78   Pulse 88   Temp 97.7  F (36.5  C) (Temporal)   Ht 1.84 m (6' 0.44\")   Wt 95.7 kg (211 lb)   SpO2 97%   BMI 28.27 kg/m    Body mass index is 28.27 " kg/m .    GENERAL: Well groomed, well developed, well nourished male in NAD.  ENT:  ENT exam normal  CV:  Warm extremities   RESPIRATORY: Normal respiratory effort.   GI:  Soft, NT, ND, no flank pain   MS: Moving all 4  NEURO: Alert and oriented x 3.  PSYCH: Normal mood and affect, pleasant and agreeable during interview and exam.    :      Prostate 60+ grams, smooth, bulbous, nonfluctuant    PVR: Residual urine by ultrasound was 54 ml.           RADIOLOGY: The following tests were reviewed:   MR PELVIS PROSTATE O     CLINICAL INFORMATION: Elevated prostate specific antigen (PSA), 7.3     COMPARISON: None.     TECHNICAL INFORMATION: 1.5T MR imaging of the prostate including high  resolution 3mm axial and sagittal T2 and axial 3.5mm diffusion  weighted (B0, B100, B800, and ) images through the prostate gland  and seminal vesicles. Dynamic enhanced images of the prostate gland  were also obtained in the axial plane. Axial T1, axial T2, and axial  T1 postcontrast images were also obtained through the entire pelvis.  Images were analyzed using a separate [employer] workstation.     INTERPRETATION: There is prostate gland enlargement secondary to  central gland hyperplasia. The prostate gland measures 5.7 x 4.5 x 6.2  cm in AP, mediolateral and craniocaudal dimensions, respectively, for  a volume of 85 mL. No significant intrinsic T1 signal is seen within  the gland.     Lesion 1:  Location: Left base anterior transitional zone  Description: There is a 2.1 x 1.1 x 0.6 cm area of diminished T2  signal with diffusion restriction and increased enema contrast  enhancement.  PI-RADS: 4     Pelvis: No pelvic adenopathy is identified. No suspicious osseous  lesion is seen.                                                                      IMPRESSION:  Single high suspicion left anterior base transitional zone lesion.     Assessment: PI-RADS 4    LABS: The last test results for Mr. Wicho Hawk were reviewed:  Results  for orders placed or performed in visit on 01/15/24 (from the past 24 hour(s))   UA reflex to Microscopic   Result Value Ref Range    Color Urine Dark Yellow (A) Colorless, Straw, Light Yellow, Yellow    Appearance Urine Slightly Cloudy (A) Clear    Glucose Urine Negative Negative mg/dL    Bilirubin Urine Moderate (A) Negative    Ketones Urine Negative Negative mg/dL    Specific Gravity Urine 1.016 1.000 - 1.030    Blood Urine Negative Negative    pH Urine 6.5 5.0 - 9.0    Protein Albumin Urine Negative Negative mg/dL    Urobilinogen Urine 6.0 (A) Normal, 2.0 mg/dL    Nitrite Urine Positive (A) Negative    Leukocyte Esterase Urine Large (A) Negative    Bacteria Urine Few (A) None Seen /HPF    RBC Urine 2 <=2 /HPF    WBC Urine >182 (H) <=5 /HPF       PSA -   Lab Results   Component Value Date    PSA 7.32 11/03/2023    PSA 5.26 10/06/2022    PSA 4.84 09/27/2021     BMP -   Recent Labs   Lab Test 12/06/23  0847 11/03/23  1301 10/06/22  1119   * 130* 129*   POTASSIUM 4.2 4.6 4.0   CHLORIDE 92* 96* 94*   CO2 27 26 31   BUN 9.3 13.2 15   CR 1.14 1.20* 1.15   * 108* 105   ARIEL 9.7 9.5 9.5       CBC -   Recent Labs   Lab Test 11/03/23  1301 10/06/22  1119 09/27/21  1635   WBC 5.4 4.5 4.9   HGB 16.0 15.6 15.1    180 197       ASSESSMENT:   Elevated PSA  Abnormal MRI of the prostate  BPH with lower urinary tract symptoms  Acute cystitis without hematuria recurrent    PLAN:   Patient with an obvious infection today.  I suspect his issues include his large prostate, poor fluid intake despite his aggressive physical activity, mainly refereeing basketball, and his relative immunosuppression from the methotrexate.    He is emptying which is encouraging.  I will culture his urine today.  He will be started on Bactrim empirically and treated for 14 days.    I have asked that he drink at least a 2 liters or more of fluid a day with less caffeine.    He will hold his methotrexate.    We reviewed his MRI and he  definitely warrants a biopsy but timing will need to wait until he is improved regarding his recurrent UTIs.    Ultimately he may need a TURP if he continues to get infections despite the tamsulosin and behavioral changes.  We would encourage a prostate biopsy prior to that just to rule out malignancy.    MRI was reviewed.  His wife was present.  All questions were addressed.    Follow-up 6 weeks.  Rustic biopsy will be held for now.    30 minutes spent on the date of this encounter doing chart review, history and exam, documentation and further activities as noted above.      Alexander Wong MD  [facility]

## 2024-01-17 LAB — BACTERIA UR CULT: ABNORMAL

## 2024-01-17 NOTE — RESULT ENCOUNTER NOTE
Notified.  Will take antibiotics for 2 weeks.  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  I emphasized staying hydrated which has been an issue for him given how active he is.  Dr. Wong

## 2024-02-14 DIAGNOSIS — I10 ESSENTIAL HYPERTENSION: ICD-10-CM

## 2024-02-15 NOTE — TELEPHONE ENCOUNTER
[facility] sent Rx request for the following:      Requested Prescriptions   Pending Prescriptions Disp Refills    lisinopril-hydrochlorothiazide (ZESTORETIC) 20-25 MG tablet [Pharmacy Med Name: Lisinopril-hydroCHLOROthiazide 20-25 MG Oral Tablet] 90 tablet 2     Sig: Take 1 tablet by mouth once daily     Last Prescription Date:   11/3/23  Last Fill Qty/Refills:         90, R-0    Last Office Visit:              11/3/23   Future Office visit:           none    Routing to PCP for refill consideration.  Samantha Andres RN on 2/15/2024 at 3:58 PM

## 2024-02-20 RX ORDER — LISINOPRIL AND HYDROCHLOROTHIAZIDE 20; 25 MG/1; MG/1
1 TABLET ORAL DAILY
Qty: 90 TABLET | Refills: 2 | Status: SHIPPED | OUTPATIENT
Start: 2024-02-20 | End: 2024-05-22

## 2024-02-27 DIAGNOSIS — N40.0 BPH WITHOUT OBSTRUCTION/LOWER URINARY TRACT SYMPTOMS: Primary | ICD-10-CM

## 2024-03-05 ENCOUNTER — LAB (OUTPATIENT)
Dept: LAB | Facility: OTHER | Age: 64
End: 2024-03-05
Attending: UROLOGY
Payer: COMMERCIAL

## 2024-03-05 ENCOUNTER — VIRTUAL VISIT (OUTPATIENT)
Dept: UROLOGY | Facility: OTHER | Age: 64
End: 2024-03-05
Attending: UROLOGY
Payer: MEDICARE

## 2024-03-05 VITALS
TEMPERATURE: 97.3 F | RESPIRATION RATE: 16 BRPM | DIASTOLIC BLOOD PRESSURE: 88 MMHG | HEART RATE: 70 BPM | OXYGEN SATURATION: 99 % | SYSTOLIC BLOOD PRESSURE: 130 MMHG

## 2024-03-05 DIAGNOSIS — N13.8 BPH WITH OBSTRUCTION/LOWER URINARY TRACT SYMPTOMS: ICD-10-CM

## 2024-03-05 DIAGNOSIS — R93.5 ABNORMAL MRI, PELVIS: ICD-10-CM

## 2024-03-05 DIAGNOSIS — N40.1 BPH WITH OBSTRUCTION/LOWER URINARY TRACT SYMPTOMS: ICD-10-CM

## 2024-03-05 DIAGNOSIS — R97.20 ELEVATED PROSTATE SPECIFIC ANTIGEN (PSA): ICD-10-CM

## 2024-03-05 DIAGNOSIS — N40.0 BPH WITHOUT OBSTRUCTION/LOWER URINARY TRACT SYMPTOMS: ICD-10-CM

## 2024-03-05 DIAGNOSIS — R97.20 ELEVATED PROSTATE SPECIFIC ANTIGEN (PSA): Primary | ICD-10-CM

## 2024-03-05 LAB
ALBUMIN UR-MCNC: NEGATIVE MG/DL
APPEARANCE UR: CLEAR
BILIRUB UR QL STRIP: NEGATIVE
COLOR UR AUTO: YELLOW
GLUCOSE UR STRIP-MCNC: NEGATIVE MG/DL
HGB UR QL STRIP: NEGATIVE
KETONES UR STRIP-MCNC: NEGATIVE MG/DL
LEUKOCYTE ESTERASE UR QL STRIP: NEGATIVE
NITRATE UR QL: NEGATIVE
PH UR STRIP: 6 [PH] (ref 5–9)
SP GR UR STRIP: 1.01 (ref 1–1.03)
UROBILINOGEN UR STRIP-MCNC: NORMAL MG/DL

## 2024-03-05 PROCEDURE — 36415 COLL VENOUS BLD VENIPUNCTURE: CPT | Mod: ZL

## 2024-03-05 PROCEDURE — 84153 ASSAY OF PSA TOTAL: CPT | Mod: ZL

## 2024-03-05 PROCEDURE — G0463 HOSPITAL OUTPT CLINIC VISIT: HCPCS | Mod: GT

## 2024-03-05 PROCEDURE — 99214 OFFICE O/P EST MOD 30 MIN: CPT | Performed by: UROLOGY

## 2024-03-05 PROCEDURE — 81003 URINALYSIS AUTO W/O SCOPE: CPT | Mod: ZL

## 2024-03-05 ASSESSMENT — PAIN SCALES - GENERAL: PAINLEVEL: SEVERE PAIN (6)

## 2024-03-05 NOTE — NURSING NOTE
"Chief Complaint   Patient presents with    Consult     6 week follow up       Initial /88   Pulse 70   Temp 97.3  F (36.3  C)   Resp 16   SpO2 99%  Estimated body mass index is 28.27 kg/m  as calculated from the following:    Height as of 1/15/24: 1.84 m (6' 0.44\").    Weight as of 1/15/24: 95.7 kg (211 lb).  Medication Review: complete    The next two questions are to help us understand your food security.  If you are feeling you need any assistance in this area, we have resources available to support you today.          11/3/2023   SDOH- Food Insecurity   Within the past 12 months, did you worry that your food would run out before you got money to buy more? N   Within the past 12 months, did the food you bought just not last and you didn t have money to get more? N         Health Care Directive:  Patient does not have a Health Care Directive or Living Will: Discussed advance care planning with patient; however, patient declined at this time.    Christiana Stack, GORDON      "

## 2024-03-05 NOTE — PROGRESS NOTES
Type of service:  Video Visit   Video Visit Start Time: 09:01  Video Visit End Time: 0936    Originating Location (pt. Location): [facility]  Distant Location (provider location): [place], [place]  Platform used for Video Visit: Epic Virtual Visit Platform    I have reviewed the note as documented above.  This accurately captures the substance of my virtual visit with the patient. The patient states an understanding and is agreeable with the plan.   Alexander Wong MD   Urology     Chief Complaint: No chief complaint on file.  Elevated PSA  BPH    HPI: Mr. 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     Developed UTI recurrent x 1 treated with Bactrim and started on tamsulosin.   At higher risk given methotrexate use for RA.  Asked patient to increase his fluid consumption as well.     Elected to hold off on his prostate biopsy given his recurrent UTIs and concerns that the infection may have influenced the MRI results.    Here today 3/5/24 for follow up.  Reports nocturia x 2 which is improved.  Stream is better.  Frequency improved with improved urgency.  He can hold it.      He has a PMH significant for RA, HTN, GERD    Current Outpatient Medications   Medication Sig Dispense Refill    aspirin (ASA) 81 MG tablet Take 1 tablet (81 mg) by mouth daily      folic acid (FOLVITE) 1 MG tablet Take 1 tablet (1,000 mcg) by mouth daily 90 tablet 3    lisinopril-hydrochlorothiazide (ZESTORETIC) 20-25 MG tablet Take 1 tablet by mouth once daily 90 tablet 2    meloxicam (MOBIC) 7.5 MG tablet Take 1 tablet (7.5 mg) by mouth daily 90 tablet 4    methotrexate 2.5 MG tablet Take 6 tablets (15 mg) by mouth once a week 72 tablet 4    omeprazole (PRILOSEC) 20 MG DR capsule Take 1 capsule (20 mg) by mouth daily 90 capsule 4     sulfamethoxazole-trimethoprim (BACTRIM DS) 800-160 MG tablet Take 1 tablet by mouth 2 times daily 14 tablet 1    sulfamethoxazole-trimethoprim (BACTRIM DS) 800-160 MG tablet Take 1 tablet by mouth 2 times daily 14 tablet 0    tamsulosin (FLOMAX) 0.4 MG capsule Take 1 capsule (0.4 mg) by mouth daily Take after supper prior to bedtime.  Watch for dizziness upon standing 30 capsule 11       ALLERGIES: Patient has no known allergies.      REVIEW OF SYSTEMS:    Skin: negative  Eyes: negative  Ears/Nose/Throat: negative  Respiratory: No shortness of breath, dyspnea on exertion, cough, or hemoptysis  Cardiovascular: negative  Gastrointestinal: negative  Genitourinary: positive for prostate  Musculoskeletal: positive for joint pain  Neurologic: negative  Psychiatric: negative  Hematologic/Lymphatic/Immunologic: negative  Endocrine: negative      GENERAL PHYSICAL EXAM:   Vitals: There were no vitals taken for this visit.  There is no height or weight on file to calculate BMI.    GENERAL: Well groomed, well developed, well nourished male in NAD.  ENT:  ENT exam normal  RESPIRATORY: Normal respiratory effort.   MS: Visibly moving all four   NEURO: Alert and oriented x 3.  PSYCH: Normal mood and affect, pleasant and agreeable during interview and exam.      RADIOLOGY: The following tests were reviewed: MRI    LABS: The last test results for Mr. Wicho Hawk were reviewed:  No results found for this or any previous visit (from the past 24 hour(s)).    PSA -   Lab Results   Component Value Date    PSA 7.32 11/03/2023    PSA 5.26 10/06/2022    PSA 4.84 09/27/2021     BMP -   Recent Labs   Lab Test 12/06/23  0847 11/03/23  1301 10/06/22  1119   * 130* 129*   POTASSIUM 4.2 4.6 4.0   CHLORIDE 92* 96* 94*   CO2 27 26 31   BUN 9.3 13.2 15   CR 1.14 1.20* 1.15   * 108* 105   ARIEL 9.7 9.5 9.5       CBC -   Recent Labs   Lab Test 11/03/23  1301 10/06/22  1119 09/27/21  1635   WBC 5.4 4.5 4.9   HGB 16.0 15.6 15.1     180 197       ASSESSMENT:   Elevated PSA  BPH with Luts  Abnormal MRI  Recurrent UTIs    PLAN:   Patient is doing much better on the tamsulosin after resolution of his 2 infections.    Some concern whether the MRI may have been influenced negatively by the active infection at the time of the results.    My plan is to draw another PSA today now that he is back to baseline and now that he has had adequate time for his infections to heal.  We may end up repeating his MRI and ultimately may end up doing the biopsy as initially discussed.    He is definitely at high risk given his methotrexate use and relative immunosuppression.  His risk is greater regarding infection.    Patient is anxious to get some results yet I explained to him why we had to wait given the false positive that the infection may have given his regarding his MRI.    All questions were addressed.    35 minutes spent on the date of this encounter doing chart review, history and exam, documentation and further activities as noted above.        Alexander Wong MD  [facility]

## 2024-03-05 NOTE — NURSING NOTE
"Chief Complaint   Patient presents with    Consult     6 week follow up       Initial There were no vitals taken for this visit. Estimated body mass index is 28.27 kg/m  as calculated from the following:    Height as of 1/15/24: 1.84 m (6' 0.44\").    Weight as of 1/15/24: 95.7 kg (211 lb).  Medication Reconciliation: complete    Екатерина Murillo RN    "

## 2024-03-06 LAB
PSA FREE MFR SERPL: 33.04 %
PSA FREE SERPL-MCNC: 3 NG/ML
PSA SERPL DL<=0.01 NG/ML-MCNC: 9.08 NG/ML (ref 0–4.5)

## 2024-03-07 DIAGNOSIS — R97.20 ELEVATED PROSTATE SPECIFIC ANTIGEN (PSA): Primary | ICD-10-CM

## 2024-03-07 DIAGNOSIS — R93.5 ABNORMAL MRI, PELVIS: ICD-10-CM

## 2024-03-07 NOTE — RESULT ENCOUNTER NOTE
Nayeli, patient needs a transperineal biopsy as the lesion is too far anterior for a transrectal biopsy.  I spoke to [facility] and they do not do this.  He needs to go to the [facility] Department of urology for this.  He is okay with this.  I am going to put a referral and can you verify with them whether they can do a transperineal biopsy?  Let me know thanks Souleymane

## 2024-03-11 ENCOUNTER — TELEPHONE (OUTPATIENT)
Dept: UROLOGY | Facility: OTHER | Age: 64
End: 2024-03-11
Payer: COMMERCIAL

## 2024-03-11 NOTE — TELEPHONE ENCOUNTER
Patient called stating he has an appointment scheduled at the [facility] for 05/20/24 with Dr Tyler Vasquez. He would like to be seen sooner and was told if the ordering provider calls the [facility], they can possibly get him in sooner.     The urology phone number at the  is [phone].    Thanks,    Nayeli Aguila on 3/11/2024 at 12:15 PM

## 2024-03-20 DIAGNOSIS — R97.20 ELEVATED PROSTATE SPECIFIC ANTIGEN (PSA): Primary | ICD-10-CM

## 2024-03-20 DIAGNOSIS — R93.5 ABNORMAL MRI, PELVIS: ICD-10-CM

## 2024-03-23 NOTE — TELEPHONE ENCOUNTER
Action 2024 JTV 7:10 PM    Action Taken CSS CALLED PATIENT. CALL DID NOT GO THROUGH.      Action 2024 JTV 4:35 PM    Action Taken [facility] CALLED PATIENT. PATIENT NO LONGER NEEDS APPOINTMENT AND HAS ALREADY BEEN SEEN AT [facility].    MEDICAL RECORDS REQUEST   [facility]  [address] [place] [address]  PHONE: [phone]  Fax: [phone]        FUTURE VISIT INFORMATION                                                   Wicho Hawk, : 1960 scheduled for future visit at [facility]    APPOINTMENT INFORMATION:  Date: 2024  Provider:  Tyler Vasquez PA-C  Reason for Visit/Diagnosis: Abnormal MRI -- Anterior lesion , PiRads 4 needing biopsy. Needs transperineal approach.    REFERRAL INFORMATION:  Referring provider:  Alexander Wong MD in  UROLOGY      RECORDS REQUESTED FOR VISIT                                                     NOTES  STATUS/DETAILS   OFFICE NOTE from referring provider  yes, 2024, 01/15/2024, 2023 -- Alexander Wong MD in  UROLOGY   MEDICATION LIST  yes   LABS     URINALYSIS (UA)  yes   PSA  yes   MRI PROSTATE  yes, 12/15/2023 -- MR PROSTATE   BIOPSY       PRE-VISIT CHECKLIST      Joint diagnostic appointment coordinated correctly          (ensure right order & amount of time) Yes   RECORD COLLECTION COMPLETE YES

## 2024-03-27 ENCOUNTER — LAB (OUTPATIENT)
Dept: LAB | Facility: OTHER | Age: 64
End: 2024-03-27
Payer: MEDICARE

## 2024-03-27 DIAGNOSIS — R97.20 ELEVATED PROSTATE SPECIFIC ANTIGEN (PSA): ICD-10-CM

## 2024-03-27 DIAGNOSIS — N40.0 BPH WITHOUT OBSTRUCTION/LOWER URINARY TRACT SYMPTOMS: ICD-10-CM

## 2024-03-27 LAB
ALBUMIN UR-MCNC: NEGATIVE MG/DL
APPEARANCE UR: CLEAR
BILIRUB UR QL STRIP: NEGATIVE
COLOR UR AUTO: NORMAL
GLUCOSE UR STRIP-MCNC: NEGATIVE MG/DL
HGB UR QL STRIP: NEGATIVE
KETONES UR STRIP-MCNC: NEGATIVE MG/DL
LEUKOCYTE ESTERASE UR QL STRIP: NEGATIVE
NITRATE UR QL: NEGATIVE
PH UR STRIP: 6 [PH] (ref 5–9)
SP GR UR STRIP: 1.01 (ref 1–1.03)
UROBILINOGEN UR STRIP-MCNC: NORMAL MG/DL

## 2024-03-27 PROCEDURE — 81003 URINALYSIS AUTO W/O SCOPE: CPT | Mod: ZL

## 2024-03-27 PROCEDURE — 87086 URINE CULTURE/COLONY COUNT: CPT | Mod: ZL

## 2024-03-29 LAB — BACTERIA UR CULT: NO GROWTH

## 2024-04-12 RX ORDER — SULFAMETHOXAZOLE/TRIMETHOPRIM 800-160 MG
1 TABLET ORAL 2 TIMES DAILY
Qty: 14 TABLET | Refills: 0 | OUTPATIENT
Start: 2024-04-12

## 2024-05-20 ENCOUNTER — PRE VISIT (OUTPATIENT)
Dept: UROLOGY | Facility: CLINIC | Age: 64
End: 2024-05-20

## 2024-05-22 ENCOUNTER — OFFICE VISIT (OUTPATIENT)
Dept: FAMILY MEDICINE | Facility: OTHER | Age: 64
End: 2024-05-22
Attending: PHYSICIAN ASSISTANT
Payer: COMMERCIAL

## 2024-05-22 VITALS
BODY MASS INDEX: 28.04 KG/M2 | WEIGHT: 207 LBS | HEART RATE: 86 BPM | DIASTOLIC BLOOD PRESSURE: 64 MMHG | SYSTOLIC BLOOD PRESSURE: 110 MMHG | OXYGEN SATURATION: 96 % | HEIGHT: 72 IN | TEMPERATURE: 97.2 F | RESPIRATION RATE: 18 BRPM

## 2024-05-22 DIAGNOSIS — R97.20 ELEVATED PROSTATE SPECIFIC ANTIGEN (PSA): ICD-10-CM

## 2024-05-22 DIAGNOSIS — I10 ESSENTIAL HYPERTENSION: ICD-10-CM

## 2024-05-22 DIAGNOSIS — K21.9 GASTROESOPHAGEAL REFLUX DISEASE WITHOUT ESOPHAGITIS: ICD-10-CM

## 2024-05-22 DIAGNOSIS — Z01.818 PREOP GENERAL PHYSICAL EXAM: Primary | ICD-10-CM

## 2024-05-22 DIAGNOSIS — M17.12 PRIMARY OSTEOARTHRITIS OF LEFT KNEE: ICD-10-CM

## 2024-05-22 DIAGNOSIS — M06.09 RHEUMATOID ARTHRITIS OF MULTIPLE SITES WITHOUT RHEUMATOID FACTOR (H): ICD-10-CM

## 2024-05-22 LAB
ANION GAP SERPL CALCULATED.3IONS-SCNC: 11 MMOL/L (ref 7–15)
BASOPHILS # BLD AUTO: 0 10E3/UL (ref 0–0.2)
BASOPHILS NFR BLD AUTO: 1 %
BUN SERPL-MCNC: 18 MG/DL (ref 8–23)
CALCIUM SERPL-MCNC: 9.6 MG/DL (ref 8.8–10.2)
CHLORIDE SERPL-SCNC: 96 MMOL/L (ref 98–107)
CREAT SERPL-MCNC: 1.3 MG/DL (ref 0.67–1.17)
DEPRECATED HCO3 PLAS-SCNC: 26 MMOL/L (ref 22–29)
EGFRCR SERPLBLD CKD-EPI 2021: 61 ML/MIN/1.73M2
EOSINOPHIL # BLD AUTO: 0.1 10E3/UL (ref 0–0.7)
EOSINOPHIL NFR BLD AUTO: 2 %
ERYTHROCYTE [DISTWIDTH] IN BLOOD BY AUTOMATED COUNT: 12.9 % (ref 10–15)
GLUCOSE SERPL-MCNC: 101 MG/DL (ref 70–99)
HCT VFR BLD AUTO: 45.8 % (ref 40–53)
HGB BLD-MCNC: 16 G/DL (ref 13.3–17.7)
IMM GRANULOCYTES # BLD: 0 10E3/UL
IMM GRANULOCYTES NFR BLD: 1 %
LYMPHOCYTES # BLD AUTO: 1 10E3/UL (ref 0.8–5.3)
LYMPHOCYTES NFR BLD AUTO: 20 %
MCH RBC QN AUTO: 34.9 PG (ref 26.5–33)
MCHC RBC AUTO-ENTMCNC: 34.9 G/DL (ref 31.5–36.5)
MCV RBC AUTO: 100 FL (ref 78–100)
MONOCYTES # BLD AUTO: 0.4 10E3/UL (ref 0–1.3)
MONOCYTES NFR BLD AUTO: 8 %
NEUTROPHILS # BLD AUTO: 3.4 10E3/UL (ref 1.6–8.3)
NEUTROPHILS NFR BLD AUTO: 69 %
NRBC # BLD AUTO: 0 10E3/UL
NRBC BLD AUTO-RTO: 0 /100
PLATELET # BLD AUTO: 182 10E3/UL (ref 150–450)
POTASSIUM SERPL-SCNC: 4.4 MMOL/L (ref 3.4–5.3)
RBC # BLD AUTO: 4.59 10E6/UL (ref 4.4–5.9)
SODIUM SERPL-SCNC: 133 MMOL/L (ref 135–145)
WBC # BLD AUTO: 4.9 10E3/UL (ref 4–11)

## 2024-05-22 PROCEDURE — 99214 OFFICE O/P EST MOD 30 MIN: CPT | Performed by: PHYSICIAN ASSISTANT

## 2024-05-22 PROCEDURE — 85048 AUTOMATED LEUKOCYTE COUNT: CPT | Mod: ZL | Performed by: PHYSICIAN ASSISTANT

## 2024-05-22 PROCEDURE — G0463 HOSPITAL OUTPT CLINIC VISIT: HCPCS | Mod: 25

## 2024-05-22 PROCEDURE — 36415 COLL VENOUS BLD VENIPUNCTURE: CPT | Mod: ZL | Performed by: PHYSICIAN ASSISTANT

## 2024-05-22 PROCEDURE — 84153 ASSAY OF PSA TOTAL: CPT | Mod: ZL | Performed by: PHYSICIAN ASSISTANT

## 2024-05-22 PROCEDURE — 80048 BASIC METABOLIC PNL TOTAL CA: CPT | Mod: ZL | Performed by: PHYSICIAN ASSISTANT

## 2024-05-22 PROCEDURE — 93005 ELECTROCARDIOGRAM TRACING: CPT | Performed by: PHYSICIAN ASSISTANT

## 2024-05-22 PROCEDURE — 93010 ELECTROCARDIOGRAM REPORT: CPT | Performed by: STUDENT IN AN ORGANIZED HEALTH CARE EDUCATION/TRAINING PROGRAM

## 2024-05-22 RX ORDER — LISINOPRIL AND HYDROCHLOROTHIAZIDE 20; 25 MG/1; MG/1
1 TABLET ORAL DAILY
Qty: 90 TABLET | Refills: 3 | Status: SHIPPED | OUTPATIENT
Start: 2024-05-22

## 2024-05-22 RX ORDER — METHOTREXATE 2.5 MG/1
15 TABLET ORAL WEEKLY
Qty: 72 TABLET | Refills: 4 | Status: SHIPPED | OUTPATIENT
Start: 2024-05-22

## 2024-05-22 RX ORDER — MELOXICAM 7.5 MG/1
7.5 TABLET ORAL DAILY
Qty: 90 TABLET | Refills: 4 | Status: SHIPPED | OUTPATIENT
Start: 2024-05-22

## 2024-05-22 RX ORDER — FOLIC ACID 1 MG/1
1000 TABLET ORAL DAILY
Qty: 90 TABLET | Refills: 3 | Status: SHIPPED | OUTPATIENT
Start: 2024-05-22

## 2024-05-22 RX ORDER — TADALAFIL 5 MG/1
5 TABLET ORAL
COMMUNITY

## 2024-05-22 ASSESSMENT — PAIN SCALES - GENERAL: PAINLEVEL: MODERATE PAIN (5)

## 2024-05-22 NOTE — PROGRESS NOTES
Preoperative Evaluation  [facility]  [address]  [place] [address]  Phone: [phone]  Fax: [phone]  Primary Provider: NIELS Chavez CNP  Pre-op Performing Provider: Kerri Garcia PA-C  May 22, 2024         5/22/2024   Surgical Information   What procedure is being done? pre op   Facility or Hospital where procedure/surgery will be performed: [facility]   Who is doing the procedure / surgery? dr gómez   Date of surgery / procedure: june 4   Time of surgery / procedure: 9am   Where do you plan to recover after surgery? at home with family     Fax number for surgical facility: [phone]    Assessment & Plan     The proposed surgical procedure is considered INTERMEDIATE risk.      ICD-10-CM    1. Preop general physical exam  Z01.818 EKG 12-lead, tracing only (Same Day)     CBC and Differential     Basic Metabolic Panel      2. Primary osteoarthritis of left knee  M17.12       3. Gastroesophageal reflux disease without esophagitis  K21.9 omeprazole (PRILOSEC) 20 MG DR capsule      4. Rheumatoid arthritis of multiple sites without rheumatoid factor (H)  M06.09 methotrexate 2.5 MG tablet     meloxicam (MOBIC) 7.5 MG tablet     folic acid (FOLVITE) 1 MG tablet      5. Elevated prostate specific antigen (PSA)  R97.20 PSA Total and Free GH      6. Essential hypertension  I10 lisinopril-hydrochlorothiazide (ZESTORETIC) 20-25 MG tablet        Vital signs are stable. Patient presented for preoperative evaluation prior to upcoming proposed procedure. Lab work updated today including CBC and BMP. CBC - hemoglobin 4.59, hematocrit 16.0. BMP - GFR 61, creatinine 1.30 - aware of recommendation to cut down on NSAID use. EKG indicated and ordered. Reviewed hold times as outlined below. Patient is in agreement and understanding of the above treatment plan. All questions and concerns were addressed and answered to patient's satisfaction. AVS reviewed with patient.   Chronic left  knee pain and osteoarthritis - rational for upcoming proposed procedure. Patient aware of risks and benefits. Surgical soap if applicable was provided. Patient will follow postoperative with surgeon.   GERD - stable with Omeprazole and lifestyle modifications - refills provided.   Rheumatoid arthritis - stable on current regimen of Methotrexate on Fridays. Refills provided.   Elevated PSA - follows with urology - PSA in process today. Will update on results and recommendations as available.   Essential hypertension - this is at goal. BMP stable. Refill Zestoretic at current dose.      - No identified additional risk factors other than previously addressed    Preoperative Medication Instructions  Hold - Meloxicam (Mobic) for 7-days prior to surgery  Hold - Vitamins/supplements for 10-14 days prior to surgery  Hold - Aspirin for 7-days prior to surgery    Continue - Omeprazole (Prilosec), Tamsulosin (Flomax), Methotrexate    Lab work on average will return in 1-2 hours, unless listed otherwise below (your provider will typically review & provide you with results and recommendations within 1 day):  - CBC - blood counts  - CMP/BMP - kidney and electrolyte lab. CMP adds in liver function  - PSA screening - prostate    Recommendation  Approval given to proceed with proposed procedure, without further diagnostic evaluation.    Subjective   Wicho is a 63 year old, presenting for the following:  Pre-Op Exam (06/04/24 Total left knee  )         No data to display              HPI related to upcoming procedure: Wicho presents to the clinic today for preoperative evaluation prior to upcoming left total knee replacement/arthroplasty (TKA) with Dr. Jin Collins MD in [place], [place]. Wicho has had chronic left knee pain for the last 20+ years. He has trialed NSAIDS, Tylenol, topical therapies and injections with progressive pain, prompting the above procedure.     He also needs a new PSA drawn today for [facility] and   [facility] per report.         5/22/2024   Pre-Op Questionnaire   Have you ever had a heart attack or stroke? No   Have you ever had surgery on your heart or blood vessels, such as a stent placement, a coronary artery bypass, or surgery on an artery in your head, neck, heart, or legs? No   Do you have chest pain with activity? No   Do you have a history of heart failure? No   Do you currently have a cold, bronchitis or symptoms of other infection? No   Do you have a cough, shortness of breath, or wheezing? No   Do you or anyone in your family have previous history of blood clots? (!) YES    Do you or does anyone in your family have a serious bleeding problem such as prolonged bleeding following surgeries or cuts? No   Have you ever had problems with anemia or been told to take iron pills? No   Have you had any abnormal blood loss such as black, tarry or bloody stools? No   Have you ever had a blood transfusion? No   Are you willing to have a blood transfusion if it is medically needed before, during, or after your surgery? Yes   Have you or any of your relatives ever had problems with anesthesia? No   Do you have sleep apnea, excessive snoring or daytime drowsiness? No   Do you have any artifical heart valves or other implanted medical devices like a pacemaker, defibrillator, or continuous glucose monitor? No   Do you have artificial joints? (!) YES   Are you allergic to latex? No     Health Care Directive  Patient does not have a Health Care Directive or Living Will: Discussed advance care planning with patient; information given to patient to review.    Preoperative Review of    reviewed - controlled substances reflected in medication list.    Status of Chronic Conditions:  HYPERTENSION - Patient has longstanding history of HTN , currently denies any symptoms referable to elevated blood pressure. Specifically denies chest pain, palpitations, dyspnea, orthopnea, PND or peripheral edema. Blood pressure readings  have been in normal range. Current medication regimen is as listed below. Patient denies any side effects of medication.     GERD - stable on Omeprazole and with lifestyle modifications    Elevated PSA - follows with Dr. Souleymane Wong MD at [facility].     Patient Active Problem List    Diagnosis Date Noted    Hyperglycemia 09/27/2021     Priority: Medium    Elevated prostate specific antigen (PSA) 08/14/2020     Priority: Medium    High risk medication use 04/20/2018     Priority: Medium    Positive anti-CCP test 04/20/2018     Priority: Medium    Esophageal reflux 01/30/2018     Priority: Medium    Hyperlipidemia 01/30/2018     Priority: Medium    ED (erectile dysfunction) 07/02/2014     Priority: Medium    Essential hypertension 03/25/2013     Priority: Medium    Rheumatoid arthritis of multiple sites without rheumatoid factor (H) 09/06/2011     Priority: Medium      Past Medical History:   Diagnosis Date    Equivocal stress test     Cardiolyte    Essential (primary) hypertension     No Comments Provided    Gastro-esophageal reflux disease without esophagitis     No Comments Provided    Hyperlipidemia     No Comments Provided    Male erectile dysfunction     7/2/2014    Rheumatoid arthritis (H)     No Comments Provided    Tubular adenoma      Past Surgical History:   Procedure Laterality Date    ARTHROSCOPY KNEE Left     No Comments Provided    ARTHROSCOPY SHOULDER Left     08/2011    ARTHROTOMY WRIST Left     Wrist surgery with fusion.    COLONOSCOPY  05/01/2009 5/01/2009,Normal screening colonoscopy, follow up recommended in 10 years    COLONOSCOPY N/A 7/26/2019    tubular adenoma, 5 year follow up    ELBOW SURGERY Right     Right elbow surgery for bone spurs/chips    ESOPHAGOSCOPY, GASTROSCOPY, DUODENOSCOPY (EGD), COMBINED      12/2015,[place]    VASECTOMY      No Comments Provided     Current Outpatient Medications   Medication Sig Dispense Refill    aspirin (ASA) 81 MG tablet Take 1  tablet (81 mg) by mouth daily      folic acid (FOLVITE) 1 MG tablet Take 1 tablet (1,000 mcg) by mouth daily 90 tablet 3    lisinopril-hydrochlorothiazide (ZESTORETIC) 20-25 MG tablet Take 1 tablet by mouth daily 90 tablet 3    meloxicam (MOBIC) 7.5 MG tablet Take 1 tablet (7.5 mg) by mouth daily 90 tablet 4    methotrexate 2.5 MG tablet Take 6 tablets (15 mg) by mouth once a week 72 tablet 4    omeprazole (PRILOSEC) 20 MG DR capsule Take 1 capsule (20 mg) by mouth daily 90 capsule 4    tadalafil (CIALIS) 5 MG tablet Take 5 mg by mouth TAKE 1-4 PILLS BY MOUTH ONE HOUR PRIOR TO DESIRED EFFECT AS NEEDED. DO NOT EXCEED 4 PILLS WITHIN A 24 HOUR PERIOD      tamsulosin (FLOMAX) 0.4 MG capsule Take 1 capsule (0.4 mg) by mouth daily Take after supper prior to bedtime.  Watch for dizziness upon standing 30 capsule 11     No Known Allergies     Social History     Tobacco Use    Smoking status: Never    Smokeless tobacco: Never   Substance Use Topics    Alcohol use: Yes     Alcohol/week: 5.0 standard drinks of alcohol     Comment: 1-2 times a week     Family History   Problem Relation Age of Onset    Other - See Comments Father          of some type of aneurysm.    Hypertension Mother          from old age    Cancer Daughter         Cancer,Leukemia     History   Drug Use No     Comment: Drug use: No     Review of Systems  Constitutional, HEENT, cardiovascular, pulmonary, GI, , musculoskeletal, neuro, skin, endocrine and psych systems are negative, except as otherwise noted.    Objective    /64   Pulse 86   Temp 97.2  F (36.2  C)   Resp 18   Ht 1.829 m (6')   Wt 93.9 kg (207 lb)   SpO2 96%   BMI 28.07 kg/m     Estimated body mass index is 28.07 kg/m  as calculated from the following:    Height as of this encounter: 1.829 m (6').    Weight as of this encounter: 93.9 kg (207 lb).  Physical Exam  GENERAL: alert and no distress  EYES: Eyes grossly normal to inspection, PERRL and conjunctivae and sclerae  normal  NECK: no adenopathy, no asymmetry, masses, or scars  RESP: lungs clear to auscultation - no rales, rhonchi or wheezes  CV: regular rate and rhythm, normal S1 S2, no S3 or S4, no murmur, click or rub, no peripheral edema  ABDOMEN: soft, nontender, no hepatosplenomegaly, no masses and bowel sounds normal  MS: no gross musculoskeletal defects noted, no edema  SKIN: no suspicious lesions or rashes  NEURO: Normal strength and tone, mentation intact and speech normal  PSYCH: mentation appears normal, affect normal/bright  LYMPH: normal ant/post cervical, supraclavicular nodes    Recent Labs   Lab Test 12/06/23  0847 11/03/23  1301   HGB  --  16.0   PLT  --  175   * 130*   POTASSIUM 4.2 4.6   CR 1.14 1.20*      Diagnostics  Recent Results (from the past 24 hour(s))   EKG 12-lead, tracing only (Same Day)    Collection Time: 05/22/24  9:14 AM   Result Value Ref Range    Systolic Blood Pressure  mmHg    Diastolic Blood Pressure  mmHg    Ventricular Rate 82 BPM    Atrial Rate 82 BPM    WA Interval 172 ms    QRS Duration 84 ms     ms    QTc 441 ms    P Axis 52 degrees    R AXIS 16 degrees    T Axis 27 degrees    Interpretation ECG       Sinus rhythm  Increased R/S ratio in V1, consider early transition or posterior infarct  Abnormal ECG  No previous ECGs available     CBC with platelets and differential    Collection Time: 05/22/24  9:35 AM   Result Value Ref Range    WBC Count 4.9 4.0 - 11.0 10e3/uL    RBC Count 4.59 4.40 - 5.90 10e6/uL    Hemoglobin 16.0 13.3 - 17.7 g/dL    Hematocrit 45.8 40.0 - 53.0 %     78 - 100 fL    MCH 34.9 (H) 26.5 - 33.0 pg    MCHC 34.9 31.5 - 36.5 g/dL    RDW 12.9 10.0 - 15.0 %    Platelet Count 182 150 - 450 10e3/uL    % Neutrophils 69 %    % Lymphocytes 20 %    % Monocytes 8 %    % Eosinophils 2 %    % Basophils 1 %    % Immature Granulocytes 1 %    NRBCs per 100 WBC 0 <1 /100    Absolute Neutrophils 3.4 1.6 - 8.3 10e3/uL    Absolute Lymphocytes 1.0 0.8 - 5.3 10e3/uL     Absolute Monocytes 0.4 0.0 - 1.3 10e3/uL    Absolute Eosinophils 0.1 0.0 - 0.7 10e3/uL    Absolute Basophils 0.0 0.0 - 0.2 10e3/uL    Absolute Immature Granulocytes 0.0 <=0.4 10e3/uL    Absolute NRBCs 0.0 10e3/uL      EKG: appears normal, NSR, borderline ST changes (attributed to motion/artifact), no LVH by voltage criteria, unchanged from previous tracings    Revised Cardiac Risk Index (RCRI)  The patient has the following serious cardiovascular risks for perioperative complications:   - No serious cardiac risks = 0 points    RCRI Interpretation: 0 points: Class I (very low risk - 0.4% complication rate)    Signed Electronically by: Kerri Garcia PA-C  Copy of this evaluation report is provided to requesting physician.

## 2024-05-22 NOTE — NURSING NOTE
Patient presents today for preop for toatl left knee replacement with  on 06/04/2024. Medication Reconciliation: complete.    Esther Baez LPN  5/22/2024 8:52 AM

## 2024-05-22 NOTE — PATIENT INSTRUCTIONS
How to Take Your Medication Before Surgery  Preoperative Medication Instructions   Hold - Meloxicam (Mobic) for 7-days prior to surgery  Hold - Vitamins/supplements for 10-14 days prior to surgery  Hold - Aspirin for 7-days prior to surgery    Continue - Omeprazole (Prilosec), Tamsulosin (Flomax), Methotrexate    Lab work on average will return in 1-2 hours, unless listed otherwise below (your provider will typically review & provide you with results and recommendations within 1 day):  - CBC - blood counts  - CMP/BMP - kidney and electrolyte lab. CMP adds in liver function  - PSA screening - prostate       Patient Education   Preparing for Your Surgery  Getting started  A nurse will call you to review your health history and instructions. They will give you an arrival time based on your scheduled surgery time. Please be ready to share:  Your doctor's clinic name and phone number  Your medical, surgical, and anesthesia history  A list of allergies and sensitivities  A list of medicines, including herbal treatments and over-the-counter drugs  Whether the patient has a legal guardian (ask how to send us the papers in advance)  Please tell us if you're pregnant--or if there's any chance you might be pregnant. Some surgeries may injure a fetus (unborn baby), so they require a pregnancy test. Surgeries that are safe for a fetus don't always need a test, and you can choose whether to have one.   If you have a child who's having surgery, please ask for a copy of Preparing for Your Child's Surgery.    Preparing for surgery  Within 10 to 30 days of surgery: Have a pre-op exam (sometimes called an H&P, or History and Physical). This can be done at a clinic or pre-operative center.  If you're having a , you may not need this exam. Talk to your care team.  At your pre-op exam, talk to your care team about all medicines you take. If you need to stop any medicines before surgery, ask when to start taking them again.  We  do this for your safety. Many medicines can make you bleed too much during surgery. Some change how well surgery (anesthesia) drugs work.  Call your insurance company to let them know you're having surgery. (If you don't have insurance, call [phone].)  Call your clinic if there's any change in your health. This includes signs of a cold or flu (sore throat, runny nose, cough, rash, fever). It also includes a scrape or scratch near the surgery site.  If you have questions on the day of surgery, call your hospital or surgery center.  Eating and drinking guidelines  For your safety: Unless your surgeon tells you otherwise, follow the guidelines below.  Eat and drink as usual until 8 hours before you arrive for surgery. After that, no food or milk.  Drink clear liquids until 2 hours before you arrive. These are liquids you can see through, like water, Gatorade, and Propel Water. They also include plain black coffee and tea (no cream or milk), candy, and breath mints. You can spit out gum when you arrive.  If you drink alcohol: Stop drinking it the night before surgery.  If your care team tells you to take medicine on the morning of surgery, it's okay to take it with a sip of water.  Preventing infection  Shower or bathe the night before and morning of your surgery. Follow the instructions your clinic gave you. (If no instructions, use regular soap.)  Don't shave or clip hair near your surgery site. We'll remove the hair if needed.  Don't smoke or vape the morning of surgery. You may chew nicotine gum up to 2 hours before surgery. A nicotine patch is okay.  Note: Some surgeries require you to completely quit smoking and nicotine. Check with your surgeon.  Your care team will make every effort to keep you safe from infection. We will:  Clean our hands often with soap and water (or an alcohol-based hand rub).  Clean the skin at your surgery site with a special soap that kills germs.  Give you a special gown to keep you  warm. (Cold raises the risk of infection.)  Wear special hair covers, masks, gowns and gloves during surgery.  Give antibiotic medicine, if prescribed. Not all surgeries need antibiotics.  What to bring on the day of surgery  Photo ID and insurance card  Copy of your health care directive, if you have one  Glasses and hearing aids (bring cases)  You can't wear contacts during surgery  Inhaler and eye drops, if you use them (tell us about these when you arrive)  CPAP machine or breathing device, if you use them  A few personal items, if spending the night  If you have . . .  A pacemaker, ICD (cardiac defibrillator) or other implant: Bring the ID card.  An implanted stimulator: Bring the remote control.  A legal guardian: Bring a copy of the certified (court-stamped) guardianship papers.  Please remove any jewelry, including body piercings. Leave jewelry and other valuables at home.  If you're going home the day of surgery  You must have a responsible adult drive you home. They should stay with you overnight as well.  If you don't have someone to stay with you, and you aren't safe to go home alone, we may keep you overnight. Insurance often won't pay for this.  After surgery  If it's hard to control your pain or you need more pain medicine, please call your surgeon's office.  Questions?   If you have any questions for your care team, list them here: _________________________________________________________________________________________________________________________________________________________________________ ____________________________________ ____________________________________ ____________________________________  For informational purposes only. Not to replace the advice of your health care provider. Copyright   2003, 2019 [facility]. All rights reserved. Clinically reviewed by Isha Gilbert MD. [employer] [id number] - REV 12/22.

## 2024-05-23 LAB
PSA FREE MFR SERPL: 26.57 %
PSA FREE SERPL-MCNC: 3.8 NG/ML
PSA SERPL DL<=0.01 NG/ML-MCNC: 14.3 NG/ML (ref 0–4.5)

## 2024-05-27 LAB
ATRIAL RATE - MUSE: 82 BPM
DIASTOLIC BLOOD PRESSURE - MUSE: NORMAL MMHG
INTERPRETATION ECG - MUSE: NORMAL
P AXIS - MUSE: 52 DEGREES
PR INTERVAL - MUSE: 172 MS
QRS DURATION - MUSE: 84 MS
QT - MUSE: 378 MS
QTC - MUSE: 441 MS
R AXIS - MUSE: 16 DEGREES
SYSTOLIC BLOOD PRESSURE - MUSE: NORMAL MMHG
T AXIS - MUSE: 27 DEGREES
VENTRICULAR RATE- MUSE: 82 BPM

## 2024-06-26 ENCOUNTER — VIRTUAL VISIT (OUTPATIENT)
Dept: UROLOGY | Facility: OTHER | Age: 64
End: 2024-06-26
Attending: UROLOGY
Payer: COMMERCIAL

## 2024-06-26 VITALS
OXYGEN SATURATION: 99 % | HEART RATE: 81 BPM | TEMPERATURE: 98.5 F | BODY MASS INDEX: 27.5 KG/M2 | DIASTOLIC BLOOD PRESSURE: 78 MMHG | SYSTOLIC BLOOD PRESSURE: 130 MMHG | WEIGHT: 202.8 LBS

## 2024-06-26 DIAGNOSIS — N13.8 BPH WITH OBSTRUCTION/LOWER URINARY TRACT SYMPTOMS: ICD-10-CM

## 2024-06-26 DIAGNOSIS — N40.1 BPH WITH OBSTRUCTION/LOWER URINARY TRACT SYMPTOMS: ICD-10-CM

## 2024-06-26 DIAGNOSIS — D07.5 PIN III (PROSTATIC INTRAEPITHELIAL NEOPLASIA III): ICD-10-CM

## 2024-06-26 DIAGNOSIS — R97.20 ELEVATED PROSTATE SPECIFIC ANTIGEN (PSA): Primary | ICD-10-CM

## 2024-06-26 PROCEDURE — 99213 OFFICE O/P EST LOW 20 MIN: CPT | Mod: 95 | Performed by: UROLOGY

## 2024-06-26 PROCEDURE — 51798 US URINE CAPACITY MEASURE: CPT | Performed by: UROLOGY

## 2024-06-26 NOTE — PROGRESS NOTES
Type of service:  Video Visit   Video Visit Start Time: 10:18  Video Visit End Time: 10:38    Originating Location (pt. Location): [facility]  Distant Location (provider location): [place], [place]  Platform used for Video Visit: Epic Virtual Visit Platform    I have reviewed the note as documented above.  This accurately captures the substance of my virtual visit with the patient. The patient states an understanding and is agreeable with the plan.   Alexander Wong MD   Urology     Chief Complaint: Follow Up (Elevated PSA)  .BPH with retention/history of recurrent UTI     HPI: Mr. Wicho Hawk is a 63 year old year old male presenting today June 26, 2024 virtually in follow up for evaluation of an elevated PSA.    PSA was 7.32 in March 2024.  MRI at that time had demonstrated a PI-RADS 4 lesion left anterior base transition zone and his prostate measured 85 cm .    He is struggled after our initial visit with 2 urinary tract infections each of which were treated with Bactrim.  He was started on tamsulosin for his prostate and reurinary retention.    Patient has a history of methotrexate use for rheumatoid arthritis which does suppress his immune system to some degree.    He was sent to the [facility] where he underwent an MRI fusion biopsy on 4/5/2024.  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  The remainder of the biopsies were negative.  It was recommended that he undergo repeat PSA testing 1 year from that date with a repeat MRI biopsy if the PSA rises.    We had asked that he see [facility] to discuss HoLEP or other outlet procedure given his urinary complaints and recurrent infections.  That appointment was canceled.    Here today for continued follow-up.  Of note he did have a PSA recently in May that was elevated at 14.3 ng/mL most likely secondary to his biopsy that had been done 4 weeks previously.    Reports nocturia x 2,  no frequency but still has moderate urgency.  Very little caffeine.       He has a PMH significant for rheumatoid arthritis on methotrexate with BPH, hypertension and GERD.    Current Outpatient Medications   Medication Sig Dispense Refill    aspirin (ASA) 81 MG tablet Take 1 tablet (81 mg) by mouth daily      folic acid (FOLVITE) 1 MG tablet Take 1 tablet (1,000 mcg) by mouth daily 90 tablet 3    lisinopril-hydrochlorothiazide (ZESTORETIC) 20-25 MG tablet Take 1 tablet by mouth daily 90 tablet 3    meloxicam (MOBIC) 7.5 MG tablet Take 1 tablet (7.5 mg) by mouth daily 90 tablet 4    methotrexate 2.5 MG tablet Take 6 tablets (15 mg) by mouth once a week 72 tablet 4    omeprazole (PRILOSEC) 20 MG DR capsule Take 1 capsule (20 mg) by mouth daily 90 capsule 4    tadalafil (CIALIS) 5 MG tablet Take 5 mg by mouth TAKE 1-4 PILLS BY MOUTH ONE HOUR PRIOR TO DESIRED EFFECT AS NEEDED. DO NOT EXCEED 4 PILLS WITHIN A 24 HOUR PERIOD      tamsulosin (FLOMAX) 0.4 MG capsule Take 1 capsule (0.4 mg) by mouth daily Take after supper prior to bedtime.  Watch for dizziness upon standing 30 capsule 11       ALLERGIES: Patient has no known allergies.     GENERAL PHYSICAL EXAM:   Vitals: /78 (Patient Position: Sitting, Cuff Size: Adult Regular)   Pulse 81   Temp 98.5  F (36.9  C)   Wt 92 kg (202 lb 12.8 oz)   SpO2 99%   BMI 27.50 kg/m    Body mass index is 27.5 kg/m .    GENERAL: Well groomed, well developed, well nourished male in NAD.  ENT:  ENT exam normal  RESPIRATORY: Normal respiratory effort.   MS: Visibly moving all four   NEURO: Alert and oriented x 3.  PSYCH: Normal mood and affect, pleasant and agreeable during interview and exam.    PVR: Residual urine by ultrasound was 15 ml.      RADIOLOGY: The following tests were reviewed: MRI dated 12/15/2023    LABS: The last test results for Mr. Wicho Hawk were reviewed:  No results found for this or any previous visit (from the past 24 hour(s)).    PSA -   Lab Results    Component Value Date    PSA 14.30 05/22/2024    PSA 9.08 03/05/2024    PSA 7.32 11/03/2023    PSA 5.26 10/06/2022    PSA 4.84 09/27/2021     BMP -   Recent Labs   Lab Test 05/22/24  0935 12/06/23  0847 11/03/23  1301   * 129* 130*   POTASSIUM 4.4 4.2 4.6   CHLORIDE 96* 92* 96*   CO2 26 27 26   BUN 18.0 9.3 13.2   CR 1.30* 1.14 1.20*   * 115* 108*   ARIEL 9.6 9.7 9.5       CBC -   Recent Labs   Lab Test 05/22/24  0935 11/03/23  1301 10/06/22  1119   WBC 4.9 5.4 4.5   HGB 16.0 16.0 15.6    175 180   Pathology MRI Fusion Biopsy:  4/5/24  Right Posterior/lateral apex::  HG PIN    ASSESSMENT:   Elevated PSA status post MRI fusion biopsy [facility] 4/5/2024  High-grade prostatic intraepithelial neoplasia  BPH with retention and LUTS on tamsulosin    PLAN:   Patient's status post MRI fusion biopsy at the [facility] with demonstration of high-grade PIN only.  That is encouraging however some concern that high-grade PIN could represent a precancerous lesion.  They did not recommend another biopsy however they did recommend a PSA in a year with consideration of another MRI or fusion biopsy if it were to continue to rise.    Recent PSA done a month ago was done to early and its elevation reflects the trauma from the recent biopsy and should be ignored.    Regarding his BPH she is doing well on the tamsulosin.  His prostate is only going to continue to grow therefore if he were to develop more irritative type symptoms or to develop urinary tract infections that he would need to consider HoLEP for treatment given the size of his prostate.  He is not a candidate for TURP.    He was encouraged to limit his caffeine.  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    Follow-up will be in February with a PSA prior.    21 minutes spent on the date of this encounter doing chart review, history and exam, documentation and further activities as noted above.        Alexander Wong MD  [facility]

## 2024-06-26 NOTE — NURSING NOTE
Chief Complaint   Patient presents with    Follow Up     Elevated PSA       Initial /78 (Patient Position: Sitting, Cuff Size: Adult Regular)   Pulse 81   Temp 98.5  F (36.9  C)   Wt 92 kg (202 lb 12.8 oz)   SpO2 99%   BMI 27.50 kg/m   Estimated body mass index is 27.5 kg/m  as calculated from the following:    Height as of 5/22/24: 1.829 m (6').    Weight as of this encounter: 92 kg (202 lb 12.8 oz).  Medication Reconciliation: complete  AUA=13  PVR=15 mL  Natalia Campbell RN

## 2024-07-15 ENCOUNTER — TELEPHONE (OUTPATIENT)
Dept: FAMILY MEDICINE | Facility: OTHER | Age: 64
End: 2024-07-15
Payer: COMMERCIAL

## 2024-07-15 NOTE — TELEPHONE ENCOUNTER
Left message for patient to call back. Patient can be put in PCPs schedule in a provider add spot.    Kendal Escobedo LPN on 7/15/2024 at 1:48 PM

## 2024-07-15 NOTE — TELEPHONE ENCOUNTER
Patient called back stating that he cannot get a preop in [place] before surgery date. Please call patient.     Brandee Dexter on 7/15/2024 at 1:09 PM

## 2024-07-23 ENCOUNTER — OFFICE VISIT (OUTPATIENT)
Dept: FAMILY MEDICINE | Facility: OTHER | Age: 64
End: 2024-07-23
Attending: NURSE PRACTITIONER
Payer: COMMERCIAL

## 2024-07-23 VITALS
TEMPERATURE: 98.2 F | OXYGEN SATURATION: 98 % | HEART RATE: 100 BPM | BODY MASS INDEX: 27.36 KG/M2 | SYSTOLIC BLOOD PRESSURE: 116 MMHG | WEIGHT: 202 LBS | DIASTOLIC BLOOD PRESSURE: 70 MMHG | RESPIRATION RATE: 18 BRPM | HEIGHT: 72 IN

## 2024-07-23 DIAGNOSIS — Z01.818 PREOP GENERAL PHYSICAL EXAM: Primary | ICD-10-CM

## 2024-07-23 DIAGNOSIS — Z79.899 HIGH RISK MEDICATION USE: ICD-10-CM

## 2024-07-23 DIAGNOSIS — M06.09 RHEUMATOID ARTHRITIS OF MULTIPLE SITES WITHOUT RHEUMATOID FACTOR (H): ICD-10-CM

## 2024-07-23 DIAGNOSIS — T14.8XXA TORN MUSCLE: ICD-10-CM

## 2024-07-23 DIAGNOSIS — I10 ESSENTIAL HYPERTENSION: ICD-10-CM

## 2024-07-23 LAB
ANION GAP SERPL CALCULATED.3IONS-SCNC: 9 MMOL/L (ref 7–15)
BUN SERPL-MCNC: 12.2 MG/DL (ref 8–23)
CALCIUM SERPL-MCNC: 9.3 MG/DL (ref 8.8–10.4)
CHLORIDE SERPL-SCNC: 95 MMOL/L (ref 98–107)
CREAT SERPL-MCNC: 1.09 MG/DL (ref 0.67–1.17)
EGFRCR SERPLBLD CKD-EPI 2021: 75 ML/MIN/1.73M2
GLUCOSE SERPL-MCNC: 127 MG/DL (ref 70–99)
HCO3 SERPL-SCNC: 27 MMOL/L (ref 22–29)
HGB BLD-MCNC: 13.9 G/DL (ref 13.3–17.7)
POTASSIUM SERPL-SCNC: 3.9 MMOL/L (ref 3.4–5.3)
SODIUM SERPL-SCNC: 131 MMOL/L (ref 135–145)

## 2024-07-23 PROCEDURE — 36415 COLL VENOUS BLD VENIPUNCTURE: CPT | Mod: ZL | Performed by: NURSE PRACTITIONER

## 2024-07-23 PROCEDURE — 85018 HEMOGLOBIN: CPT | Mod: ZL | Performed by: NURSE PRACTITIONER

## 2024-07-23 PROCEDURE — 82374 ASSAY BLOOD CARBON DIOXIDE: CPT | Mod: ZL | Performed by: NURSE PRACTITIONER

## 2024-07-23 PROCEDURE — 99213 OFFICE O/P EST LOW 20 MIN: CPT | Performed by: NURSE PRACTITIONER

## 2024-07-23 PROCEDURE — G2211 COMPLEX E/M VISIT ADD ON: HCPCS | Performed by: NURSE PRACTITIONER

## 2024-07-23 PROCEDURE — G0463 HOSPITAL OUTPT CLINIC VISIT: HCPCS

## 2024-07-23 ASSESSMENT — PAIN SCALES - GENERAL: PAINLEVEL: MILD PAIN (3)

## 2024-07-23 NOTE — PROGRESS NOTES
Preoperative Evaluation  [facility]  [address]  [place] [address]  Phone: [phone]  Fax: [phone]  Primary Provider: NIELS Chavez CNP  Pre-op Performing Provider: NIELS Chavez CNP  Jul 23, 2024 7/23/2024   Surgical Information   What procedure is being done? Left quad repair   Facility or Hospital where procedure/surgery will be performed: [facility]   Who is doing the procedure / surgery? dr gómez   Date of surgery / procedure: july 30th   Time of surgery / procedure: am   Where do you plan to recover after surgery? at home with family        Fax number for surgical facility: [phone]    Assessment & Plan     The proposed surgical procedure is considered INTERMEDIATE risk.    Problem List Items Addressed This Visit          Circulatory    Essential hypertension       Immune    Rheumatoid arthritis of multiple sites without rheumatoid factor (H)       Other    High risk medication use     Other Visit Diagnoses       Preop general physical exam    -  Primary    Relevant Orders    Basic Metabolic Panel (Completed)    Hemoglobin (Completed)    Torn muscle              He is optimized for upcoming procedure as requested.    The longitudinal plan of care for the diagnosis(es)/condition(s) as documented were addressed during this visit. Due to the added complexity in care, I will continue to support Wicho in the subsequent management and with ongoing continuity of care.          - No identified additional risk factors other than previously addressed    Preoperative Medication Instructions  Antiplatelet or Anticoagulation Medication Instructions   - aspirin: Discontinue aspirin 7-10 days prior to procedure to reduce bleeding risk. It should be resumed postoperatively.     Additional Medication Instructions   - ACE/ARB: DO NOT TAKE on day of surgery (minimum 11 hours for general anesthesia).   - meloxicam (Mobic): DO NOT TAKE 10  days before surgery.     -Will hold methotrexate Friday before surgery and resume normal schedule after surgery completed.  Recommendation  Approval given to proceed with proposed procedure, without further diagnostic evaluation.    Celina Sands is a 63 year old, presenting for the following:  Pre-Op Exam        HPI related to upcoming procedure: He presents to clinic today for preoperative clearance.  He had left knee surgery on June 4, 2 days later he tore his left quad muscle.  He continues to have swelling and discomfort to the area.  Planning to have this repaired.  He is not having any health concerns today.  Blood pressure has been under good control.  Continues to take methotrexate weekly and tolerating well.        7/23/2024   Pre-Op Questionnaire   Have you ever had a heart attack or stroke? No   Have you ever had surgery on your heart or blood vessels, such as a stent placement, a coronary artery bypass, or surgery on an artery in your head, neck, heart, or legs? No   Do you have chest pain with activity? No   Do you have a history of heart failure? No   Do you currently have a cold, bronchitis or symptoms of other infection? No   Do you have a cough, shortness of breath, or wheezing? No   Do you or anyone in your family have previous history of blood clots? (!) YES    Do you or does anyone in your family have a serious bleeding problem such as prolonged bleeding following surgeries or cuts? No   Have you ever had problems with anemia or been told to take iron pills? No   Have you had any abnormal blood loss such as black, tarry or bloody stools? No   Have you ever had a blood transfusion? No   Are you willing to have a blood transfusion if it is medically needed before, during, or after your surgery? Yes   Have you or any of your relatives ever had problems with anesthesia? No   Do you have sleep apnea, excessive snoring or daytime drowsiness? No   Do you have any artifical heart valves or other  implanted medical devices like a pacemaker, defibrillator, or continuous glucose monitor? No   Do you have artificial joints? (!) YES   Are you allergic to latex? No        Health Care Directive  Patient does not have a Health Care Directive or Living Will:     Preoperative Review of    reviewed - no record of controlled substances prescribed.      Status of Chronic Conditions:  See problem list for active medical problems.  Problems all longstanding and stable, except as noted/documented.  See ROS for pertinent symptoms related to these conditions.    Patient Active Problem List    Diagnosis Date Noted    Hyperglycemia 09/27/2021     Priority: Medium    Elevated prostate specific antigen (PSA) 08/14/2020     Priority: Medium    High risk medication use 04/20/2018     Priority: Medium    Positive anti-CCP test 04/20/2018     Priority: Medium    Esophageal reflux 01/30/2018     Priority: Medium    Hyperlipidemia 01/30/2018     Priority: Medium    ED (erectile dysfunction) 07/02/2014     Priority: Medium    Essential hypertension 03/25/2013     Priority: Medium    Rheumatoid arthritis of multiple sites without rheumatoid factor (H) 09/06/2011     Priority: Medium      Past Medical History:   Diagnosis Date    Equivocal stress test     Cardiolyte    Essential (primary) hypertension     No Comments Provided    Gastro-esophageal reflux disease without esophagitis     No Comments Provided    Hyperlipidemia     No Comments Provided    Male erectile dysfunction     7/2/2014    Rheumatoid arthritis (H)     No Comments Provided    Tubular adenoma      Past Surgical History:   Procedure Laterality Date    ARTHROSCOPY KNEE Left     No Comments Provided    ARTHROSCOPY SHOULDER Left     08/2011    ARTHROTOMY WRIST Left     Wrist surgery with fusion.    COLONOSCOPY  05/01/2009 5/01/2009,Normal screening colonoscopy, follow up recommended in 10 years    COLONOSCOPY N/A 7/26/2019    tubular adenoma, 5 year follow up     ELBOW SURGERY Right     Right elbow surgery for bone spurs/chips    ESOPHAGOSCOPY, GASTROSCOPY, DUODENOSCOPY (EGD), COMBINED      2015,[place]    VASECTOMY      No Comments Provided     Current Outpatient Medications   Medication Sig Dispense Refill    aspirin (ASA) 81 MG tablet Take 1 tablet (81 mg) by mouth daily      folic acid (FOLVITE) 1 MG tablet Take 1 tablet (1,000 mcg) by mouth daily 90 tablet 3    lisinopril-hydrochlorothiazide (ZESTORETIC) 20-25 MG tablet Take 1 tablet by mouth daily 90 tablet 3    meloxicam (MOBIC) 7.5 MG tablet Take 1 tablet (7.5 mg) by mouth daily 90 tablet 4    methotrexate 2.5 MG tablet Take 6 tablets (15 mg) by mouth once a week 72 tablet 4    omeprazole (PRILOSEC) 20 MG DR capsule Take 1 capsule (20 mg) by mouth daily 90 capsule 4    tadalafil (CIALIS) 5 MG tablet Take 5 mg by mouth TAKE 1-4 PILLS BY MOUTH ONE HOUR PRIOR TO DESIRED EFFECT AS NEEDED. DO NOT EXCEED 4 PILLS WITHIN A 24 HOUR PERIOD      tamsulosin (FLOMAX) 0.4 MG capsule Take 1 capsule (0.4 mg) by mouth daily Take after supper prior to bedtime.  Watch for dizziness upon standing 30 capsule 11       No Known Allergies     Social History     Tobacco Use    Smoking status: Never    Smokeless tobacco: Never   Substance Use Topics    Alcohol use: Yes     Alcohol/week: 5.0 standard drinks of alcohol     Comment: 1-2 times a week     Family History   Problem Relation Age of Onset    Other - See Comments Father          of some type of aneurysm.    Hypertension Mother          from old age    Cancer Daughter         Cancer,Leukemia     History   Drug Use No     Comment: Drug use: No               Objective    /70   Pulse 100   Temp 98.2  F (36.8  C)   Resp 18   Ht 1.829 m (6')   Wt 91.6 kg (202 lb)   SpO2 98%   BMI 27.40 kg/m     Estimated body mass index is 27.4 kg/m  as calculated from the following:    Height as of this encounter: 1.829 m (6').    Weight as of this encounter: 91.6 kg (202  lb).  Physical Exam  GENERAL: alert and no distress  EYES: Eyes grossly normal to inspection, PERRL and conjunctivae and sclerae normal  HENT: ear canals and TM's normal, nose and mouth without ulcers or lesions  NECK: no adenopathy, no asymmetry, masses, or scars  RESP: lungs clear to auscultation - no rales, rhonchi or wheezes  CV: regular rate and rhythm, normal S1 S2, no S3 or S4, no murmur, click or rub, no peripheral edema  ABDOMEN: soft, nontender, no hepatosplenomegaly, no masses and bowel sounds normal  MS: no gross musculoskeletal defects noted, significant left knee swelling  SKIN: no suspicious lesions or rashes  NEURO: Normal strength and tone, mentation intact and speech normal  PSYCH: mentation appears normal, affect normal/bright    Recent Labs   Lab Test 05/22/24  0935 12/06/23  0847 11/03/23  1301   HGB 16.0  --  16.0     --  175   * 129* 130*   POTASSIUM 4.4 4.2 4.6   CR 1.30* 1.14 1.20*        Diagnostics  Recent Results (from the past 24 hour(s))   Basic Metabolic Panel    Collection Time: 07/23/24  2:00 PM   Result Value Ref Range    Sodium 131 (L) 135 - 145 mmol/L    Potassium 3.9 3.4 - 5.3 mmol/L    Chloride 95 (L) 98 - 107 mmol/L    Carbon Dioxide (CO2) 27 22 - 29 mmol/L    Anion Gap 9 7 - 15 mmol/L    Urea Nitrogen 12.2 8.0 - 23.0 mg/dL    Creatinine 1.09 0.67 - 1.17 mg/dL    GFR Estimate 75 >60 mL/min/1.73m2    Calcium 9.3 8.8 - 10.4 mg/dL    Glucose 127 (H) 70 - 99 mg/dL   Hemoglobin    Collection Time: 07/23/24  2:00 PM   Result Value Ref Range    Hemoglobin 13.9 13.3 - 17.7 g/dL      No EKG this visit, completed in the last 90 days.    Revised Cardiac Risk Index (RCRI)  The patient has the following serious cardiovascular risks for perioperative complications:   - No serious cardiac risks = 0 points     RCRI Interpretation: 0 points: Class I (very low risk - 0.4% complication rate)         Signed Electronically by: NIELS Chavez CNP  A copy of this evaluation report  is provided to the requesting physician.

## 2024-07-23 NOTE — PATIENT INSTRUCTIONS
How to Take Your Medication Before Surgery  Preoperative Medication Instructions   Antiplatelet or Anticoagulation Medication Instructions   - aspirin: Discontinue aspirin 7-10 days prior to procedure to reduce bleeding risk. It should be resumed postoperatively.     Additional Medication Instructions   - ACE/ARB: DO NOT TAKE on day of surgery (minimum 11 hours for general anesthesia).   - meloxicam (Mobic): DO NOT TAKE 10 days before surgery.        Patient Education   Preparing for Your Surgery  Getting started  A nurse will call you to review your health history and instructions. They will give you an arrival time based on your scheduled surgery time. Please be ready to share:  Your doctor's clinic name and phone number  Your medical, surgical, and anesthesia history  A list of allergies and sensitivities  A list of medicines, including herbal treatments and over-the-counter drugs  Whether the patient has a legal guardian (ask how to send us the papers in advance)  Please tell us if you're pregnant--or if there's any chance you might be pregnant. Some surgeries may injure a fetus (unborn baby), so they require a pregnancy test. Surgeries that are safe for a fetus don't always need a test, and you can choose whether to have one.   If you have a child who's having surgery, please ask for a copy of Preparing for Your Child's Surgery.    Preparing for surgery  Within 10 to 30 days of surgery: Have a pre-op exam (sometimes called an H&P, or History and Physical). This can be done at a clinic or pre-operative center.  If you're having a , you may not need this exam. Talk to your care team.  At your pre-op exam, talk to your care team about all medicines you take. If you need to stop any medicines before surgery, ask when to start taking them again.  We do this for your safety. Many medicines can make you bleed too much during surgery. Some change how well surgery (anesthesia) drugs work.  Call your insurance  company to let them know you're having surgery. (If you don't have insurance, call [phone].)  Call your clinic if there's any change in your health. This includes signs of a cold or flu (sore throat, runny nose, cough, rash, fever). It also includes a scrape or scratch near the surgery site.  If you have questions on the day of surgery, call your hospital or surgery center.  Eating and drinking guidelines  For your safety: Unless your surgeon tells you otherwise, follow the guidelines below.  Eat and drink as usual until 8 hours before you arrive for surgery. After that, no food or milk.  Drink clear liquids until 2 hours before you arrive. These are liquids you can see through, like water, Gatorade, and Propel Water. They also include plain black coffee and tea (no cream or milk), candy, and breath mints. You can spit out gum when you arrive.  If you drink alcohol: Stop drinking it the night before surgery.  If your care team tells you to take medicine on the morning of surgery, it's okay to take it with a sip of water.  Preventing infection  Shower or bathe the night before and morning of your surgery. Follow the instructions your clinic gave you. (If no instructions, use regular soap.)  Don't shave or clip hair near your surgery site. We'll remove the hair if needed.  Don't smoke or vape the morning of surgery. You may chew nicotine gum up to 2 hours before surgery. A nicotine patch is okay.  Note: Some surgeries require you to completely quit smoking and nicotine. Check with your surgeon.  Your care team will make every effort to keep you safe from infection. We will:  Clean our hands often with soap and water (or an alcohol-based hand rub).  Clean the skin at your surgery site with a special soap that kills germs.  Give you a special gown to keep you warm. (Cold raises the risk of infection.)  Wear special hair covers, masks, gowns and gloves during surgery.  Give antibiotic medicine, if prescribed. Not  all surgeries need antibiotics.  What to bring on the day of surgery  Photo ID and insurance card  Copy of your health care directive, if you have one  Glasses and hearing aids (bring cases)  You can't wear contacts during surgery  Inhaler and eye drops, if you use them (tell us about these when you arrive)  CPAP machine or breathing device, if you use them  A few personal items, if spending the night  If you have . . .  A pacemaker, ICD (cardiac defibrillator) or other implant: Bring the ID card.  An implanted stimulator: Bring the remote control.  A legal guardian: Bring a copy of the certified (court-stamped) guardianship papers.  Please remove any jewelry, including body piercings. Leave jewelry and other valuables at home.  If you're going home the day of surgery  You must have a responsible adult drive you home. They should stay with you overnight as well.  If you don't have someone to stay with you, and you aren't safe to go home alone, we may keep you overnight. Insurance often won't pay for this.  After surgery  If it's hard to control your pain or you need more pain medicine, please call your surgeon's office.  Questions?   If you have any questions for your care team, list them here: _________________________________________________________________________________________________________________________________________________________________________ ____________________________________ ____________________________________ ____________________________________  For informational purposes only. Not to replace the advice of your health care provider. Copyright   2003, 2019 [facility]. All rights reserved. Clinically reviewed by Isha Gilbert MD. [employer] [id number] - REV 12/22.

## 2024-12-17 ENCOUNTER — OFFICE VISIT (OUTPATIENT)
Dept: FAMILY MEDICINE | Facility: OTHER | Age: 64
End: 2024-12-17
Payer: MEDICARE

## 2024-12-17 VITALS
SYSTOLIC BLOOD PRESSURE: 120 MMHG | WEIGHT: 203 LBS | HEART RATE: 96 BPM | DIASTOLIC BLOOD PRESSURE: 64 MMHG | RESPIRATION RATE: 18 BRPM | BODY MASS INDEX: 27.53 KG/M2 | OXYGEN SATURATION: 98 % | TEMPERATURE: 97.4 F

## 2024-12-17 DIAGNOSIS — I45.10 RIGHT BUNDLE BRANCH BLOCK: ICD-10-CM

## 2024-12-17 DIAGNOSIS — M06.09 RHEUMATOID ARTHRITIS OF MULTIPLE SITES WITHOUT RHEUMATOID FACTOR (H): ICD-10-CM

## 2024-12-17 DIAGNOSIS — M17.12 PRIMARY OSTEOARTHRITIS OF LEFT KNEE: ICD-10-CM

## 2024-12-17 DIAGNOSIS — I10 ESSENTIAL HYPERTENSION: ICD-10-CM

## 2024-12-17 DIAGNOSIS — Z01.818 PREOP GENERAL PHYSICAL EXAM: Primary | ICD-10-CM

## 2024-12-17 DIAGNOSIS — K21.9 GASTROESOPHAGEAL REFLUX DISEASE WITHOUT ESOPHAGITIS: ICD-10-CM

## 2024-12-17 LAB
ANION GAP SERPL CALCULATED.3IONS-SCNC: 6 MMOL/L (ref 7–15)
ATRIAL RATE - MUSE: 79 BPM
BASOPHILS # BLD AUTO: 0.1 10E3/UL (ref 0–0.2)
BASOPHILS NFR BLD AUTO: 1 %
BUN SERPL-MCNC: 8.4 MG/DL (ref 8–23)
CALCIUM SERPL-MCNC: 9.1 MG/DL (ref 8.8–10.4)
CHLORIDE SERPL-SCNC: 98 MMOL/L (ref 98–107)
CREAT SERPL-MCNC: 1.06 MG/DL (ref 0.67–1.17)
DIASTOLIC BLOOD PRESSURE - MUSE: NORMAL MMHG
EGFRCR SERPLBLD CKD-EPI 2021: 77 ML/MIN/1.73M2
EOSINOPHIL # BLD AUTO: 0.2 10E3/UL (ref 0–0.7)
EOSINOPHIL NFR BLD AUTO: 4 %
ERYTHROCYTE [DISTWIDTH] IN BLOOD BY AUTOMATED COUNT: 14.2 % (ref 10–15)
GLUCOSE SERPL-MCNC: 118 MG/DL (ref 70–99)
HCO3 SERPL-SCNC: 31 MMOL/L (ref 22–29)
HCT VFR BLD AUTO: 44.2 % (ref 40–53)
HGB BLD-MCNC: 15.1 G/DL (ref 13.3–17.7)
IMM GRANULOCYTES # BLD: 0 10E3/UL
IMM GRANULOCYTES NFR BLD: 1 %
INTERPRETATION ECG - MUSE: NORMAL
LYMPHOCYTES # BLD AUTO: 1 10E3/UL (ref 0.8–5.3)
LYMPHOCYTES NFR BLD AUTO: 17 %
MCH RBC QN AUTO: 31.7 PG (ref 26.5–33)
MCHC RBC AUTO-ENTMCNC: 34.2 G/DL (ref 31.5–36.5)
MCV RBC AUTO: 93 FL (ref 78–100)
MONOCYTES # BLD AUTO: 0.5 10E3/UL (ref 0–1.3)
MONOCYTES NFR BLD AUTO: 8 %
NEUTROPHILS # BLD AUTO: 4.1 10E3/UL (ref 1.6–8.3)
NEUTROPHILS NFR BLD AUTO: 70 %
NRBC # BLD AUTO: 0 10E3/UL
NRBC BLD AUTO-RTO: 0 /100
P AXIS - MUSE: 54 DEGREES
PLATELET # BLD AUTO: 215 10E3/UL (ref 150–450)
POTASSIUM SERPL-SCNC: 4.1 MMOL/L (ref 3.4–5.3)
PR INTERVAL - MUSE: 148 MS
QRS DURATION - MUSE: 126 MS
QT - MUSE: 420 MS
QTC - MUSE: 481 MS
R AXIS - MUSE: 24 DEGREES
RBC # BLD AUTO: 4.77 10E6/UL (ref 4.4–5.9)
SODIUM SERPL-SCNC: 135 MMOL/L (ref 135–145)
SYSTOLIC BLOOD PRESSURE - MUSE: NORMAL MMHG
T AXIS - MUSE: 29 DEGREES
VENTRICULAR RATE- MUSE: 79 BPM
WBC # BLD AUTO: 5.9 10E3/UL (ref 4–11)

## 2024-12-17 PROCEDURE — 80048 BASIC METABOLIC PNL TOTAL CA: CPT | Mod: ZL

## 2024-12-17 PROCEDURE — G0463 HOSPITAL OUTPT CLINIC VISIT: HCPCS

## 2024-12-17 PROCEDURE — 36415 COLL VENOUS BLD VENIPUNCTURE: CPT | Mod: ZL

## 2024-12-17 PROCEDURE — 85025 COMPLETE CBC W/AUTO DIFF WBC: CPT | Mod: ZL

## 2024-12-17 ASSESSMENT — PAIN SCALES - GENERAL: PAINLEVEL_OUTOF10: MODERATE PAIN (5)

## 2024-12-17 NOTE — PATIENT INSTRUCTIONS
How to Take Your Medication Before Surgery  Preoperative Medication Instructions   Antiplatelet or Anticoagulation Medication Instructions   - aspirin: Discontinue aspirin 7-10 days prior to procedure to reduce bleeding risk. It should be resumed postoperatively.     Additional Medication Instructions   - ACE/ARB: DO NOT TAKE on day of surgery (minimum 11 hours for general anesthesia).   - Diuretics: DO NOT TAKE on the day of surgery. (Do not take lisinopril-hydrochlorothiazide day of surgery- make sure last dose is 11 hours prior to anesthesia).    - Herbal medications and vitamins: DO NOT TAKE 14 days prior to surgery.   - meloxicam (Mobic): DO NOT TAKE 10 days before surgery.      - tadalafil: DO NOT TAKE for 3 days (72 hours) prior to surgery.    Continue - Omeprazole (Prilosec), Tamsulosin (Flomax),     Methotrexate: May skip dose prior to surgery. Follow surgeons discretion.        Patient Education   Preparing for Your Surgery  For Adults  Getting started  In most cases, a nurse will call to review your health history and instructions. They will give you an arrival time based on your scheduled surgery time. Please be ready to share:  Your doctor's clinic name and phone number  Your medical, surgical, and anesthesia history  A list of allergies and sensitivities  A list of medicines, including herbal treatments and over-the-counter drugs  Whether the patient has a legal guardian (ask how to send us the papers in advance)  Note: You may not receive a call if you were seen at our PAC (Preoperative Assessment Center).  Please tell us if you're pregnant--or if there's any chance you might be pregnant. Some surgeries may injure a fetus (unborn baby), so they require a pregnancy test. Surgeries that are safe for a fetus don't always need a test, and you can choose whether to have one.   Preparing for surgery  Within 10 to 30 days of surgery: Have a pre-op exam (sometimes called an H&P, or History and Physical). This  can be done at a clinic or pre-operative center.  If you're having a , you may not need this exam. Talk to your care team.  At your pre-op exam, talk to your care team about all medicines you take. (This includes CBD oil and any drugs, such as THC, marijuana, and other forms of cannabis.) If you need to stop any medicine before surgery, ask when to start taking it again.  This is for your safety. Many medicines and drugs can make you bleed too much during surgery. Some change how well surgery (anesthesia) drugs work.  Call your insurance company to let them know you're having surgery. (If you don't have insurance, call [phone].)  Call your clinic if there's any change in your health. This includes a scrape or scratch near the surgery site, or any signs of a cold (sore throat, runny nose, cough, rash, fever).  Eating and drinking guidelines  For your safety: Unless your surgeon tells you otherwise, follow the guidelines below.  Eat and drink as normal until 8 hours before you arrive for surgery. After that, no food or milk. You can spit out gum when you arrive.  Drink clear liquids until 2 hours before you arrive. These are liquids you can see through, like water, Gatorade, and Propel Water. They also include plain black coffee and tea (no cream or milk).  No alcohol for 24 hours before you arrive. The night before surgery, stop any drinks that contain THC.  If your care team tells you to take medicine on the morning of surgery, it's okay to take it with a sip of water. No other medicines or drugs are allowed (including CBD oil)--follow your care team's instructions.  If you have questions the day of surgery, call your hospital or surgery center.   Preventing infection  Shower or bathe the night before and the morning of surgery. Follow the instructions your clinic gave you. (If no instructions, use regular soap.)  Don't shave or clip hair near your surgery site. We'll remove the hair if needed.  Don't  smoke or vape the morning of surgery. No chewing tobacco for 6 hours before you arrive. A nicotine patch is okay. You may spit out nicotine gum when you arrive.  For some surgeries, the surgeon will tell you to fully quit smoking and nicotine.  We will make every effort to keep you safe from infection. We will:  Clean our hands often with soap and water (or an alcohol-based hand rub).  Clean the skin at your surgery site with a special soap that kills germs.  Give you a special gown to keep you warm. (Cold raises the risk of infection.)  Wear hair covers, masks, gowns, and gloves during surgery.  Give antibiotic medicine, if prescribed. Not all surgeries need this medicine.  What to bring on the day of surgery  Photo ID and insurance card  Copy of your health care directive, if you have one  Glasses and hearing aids (bring cases)  You can't wear contacts during surgery  Inhaler and eye drops, if you use them (tell us about these when you arrive)  CPAP machine or breathing device, if you use them  A few personal items, if spending the night  If you have . . .  A pacemaker, ICD (cardiac defibrillator), or other implant: Bring the ID card.  An implanted stimulator: Bring the remote control.  A legal guardian: Bring a copy of the certified (court-stamped) guardianship papers.  Please remove any jewelry, including body piercings. Leave jewelry and other valuables at home.  If you're going home the day of surgery  You must have a responsible adult drive you home. They should stay with you overnight as well.  If you don't have someone to stay with you, and you aren't safe to go home alone, we may keep you overnight. Insurance often won't pay for this.  After surgery  If it's hard to control your pain or you need more pain medicine, please call your surgeon's office.  Questions?   If you have any questions for your care team, list them here:    ____________________________________________________________________________________________________________________________________________________________________________________________________________________________________________________________  For informational purposes only. Not to replace the advice of your health care provider. Copyright   2003, 2019 [facility]. All rights reserved. Clinically reviewed by Mauro Jara MD. [employer] 688269 - REV 08/24.

## 2024-12-17 NOTE — PROGRESS NOTES
Preoperative Evaluation  [facility]  [address]  [place] [address]  Phone: [phone]  Fax: [phone]  Primary Provider: NIELS Chavez CNP  Pre-op Performing Provider: NIELS WALTERS CNP  Dec 17, 2024             12/17/2024   Surgical Information   What procedure is being done? knee surgery    Facility or Hospital where procedure/surgery will be performed: [facility]    Who is doing the procedure / surgery? gentry blanchard    Date of surgery / procedure: jan 8th    Time of surgery / procedure: am    Where do you plan to recover after surgery? at home with family        Patient-reported     Fax number for surgical facility: [phone]    Assessment & Plan     The proposed surgical procedure is considered INTERMEDIATE risk.    Problem List Items Addressed This Visit       Rheumatoid arthritis of multiple sites without rheumatoid factor (H)    Esophageal reflux    Essential hypertension    Relevant Orders    EKG 12-lead, tracing only (Same Day) (Completed)    CBC and Differential (Completed)    Basic Metabolic Panel (Completed)     Other Visit Diagnoses       Preop general physical exam    -  Primary    Relevant Orders    EKG 12-lead, tracing only (Same Day) (Completed)    CBC and Differential (Completed)    Basic Metabolic Panel (Completed)    Primary osteoarthritis of left knee        Right bundle branch block              Presents for preop physical exam.  He will undergo total knee revision on 1/8/2025 with Dr. Blanchard.  He has history of RA well-controlled with methotrexate, has previously discontinued prior to orthopedic surgeries and tolerated well.  History of hypertension well-controlled on Zestoretic, blood pressure today 120/64, will continue therapy and hold day of surgery.  EKG obtained today and does show right bundle velasquez block, new for patient, however he denies any symptoms, no dizziness, chest pain, shortness of breath, etc.  At this time  we will continue to monitor.  Lab work was obtained, renal function stable for patient, CBC obtained and was within normal limits, no anemias.     - No identified additional risk factors other than previously addressed    How to Take Your Medication Before Surgery  Preoperative Medication Instructions   Antiplatelet or Anticoagulation Medication Instructions   - aspirin: Discontinue aspirin 7-10 days prior to procedure to reduce bleeding risk. It should be resumed postoperatively.     Additional Medication Instructions   - ACE/ARB: DO NOT TAKE on day of surgery (minimum 11 hours for general anesthesia).   - Diuretics: DO NOT TAKE on the day of surgery. (Do not take lisinopril-hydrochlorothiazide day of surgery- make sure last dose is 11 hours prior to anesthesia).    - Herbal medications and vitamins: DO NOT TAKE 14 days prior to surgery.   - meloxicam (Mobic): DO NOT TAKE 10 days before surgery.      - tadalafil: DO NOT TAKE for 3 days (72 hours) prior to surgery.    Continue - Omeprazole (Prilosec), Tamsulosin (Flomax),     Methotrexate: May skip dose prior to surgery. Follow surgeons discretion.     Recommendation  Approval given to proceed with proposed procedure, without further diagnostic evaluation.    Celina Sands is a 64 year old, presenting for the following:  Pre-Op Exam (Knee replacement with Dr. Mathew Blanchard at [facility] in [place] on 1/8/25)          12/17/2024    10:21 AM   Additional Questions   Roomed by emely dejesus         12/17/2024   Forms   Any forms needing to be completed Yes        HPI related to upcoming procedure: Patient presents for preop physical, he is having total knee revision to left knee.  He previously had his knee done on 6/4/2024, he then ruptured left quad and has been having issues with the knee since that time.  He did undergo a left quad repair on 7/30/2024.    Patient with history of rheumatoid arthritis, well-controlled on methotrexate, for previous orthopedic surgeries he  has discontinued prior to surgery and has tolerated this well.    History of hypertension well-controlled on Zestoretic.        12/17/2024   Pre-Op Questionnaire   Have you ever had a heart attack or stroke? No    Have you ever had surgery on your heart or blood vessels, such as a stent placement, a coronary artery bypass, or surgery on an artery in your head, neck, heart, or legs? No    Do you have chest pain with activity? No    Do you have a history of heart failure? (!) YES Family history of heart attack, no personal history.     Do you currently have a cold, bronchitis or symptoms of other infection? No    Do you have a cough, shortness of breath, or wheezing? No    Do you or anyone in your family have previous history of blood clots? (!) YES, his father had history of blood clot    Do you or does anyone in your family have a serious bleeding problem such as prolonged bleeding following surgeries or cuts? No    Have you ever had problems with anemia or been told to take iron pills? No    Have you had any abnormal blood loss such as black, tarry or bloody stools? No    Have you ever had a blood transfusion? No    Are you willing to have a blood transfusion if it is medically needed before, during, or after your surgery? Yes    Have you or any of your relatives ever had problems with anesthesia? No    Do you have sleep apnea, excessive snoring or daytime drowsiness? No    Do you have any artifical heart valves or other implanted medical devices like a pacemaker, defibrillator, or continuous glucose monitor? No    Do you have artificial joints? (!) YES    Are you allergic to latex? No        Patient-reported     Health Care Directive  Patient does not have a Health Care Directive: Discussed advance care planning with patient; however, patient declined at this time.    Preoperative Review of    reviewed - controlled substances reflected in medication list.      Status of Chronic Conditions:  HYPERTENSION -  Patient has longstanding history of HTN , currently denies any symptoms referable to elevated blood pressure. Specifically denies chest pain, palpitations, dyspnea, orthopnea, PND or peripheral edema. Blood pressure readings have been in normal range. Current medication regimen is as listed below. Patient denies any side effects of medication.     Answers submitted by the patient for this visit:  Hypertension Visit (Submitted on 12/17/2024)  Chief Complaint: Chronic problems general questions HPI Form  Do you check your blood pressure regularly outside of the clinic?: No  Are your blood pressures ever more than 140 on the top number (systolic) OR more than 90 on the bottom number (diastolic)? (For example, greater than 140/90): No  Are you following a low salt diet?: No  Questionnaire about: Chronic problems general questions HPI Form (Submitted on 12/17/2024)  Chief Complaint: Chronic problems general questions HPI Form    Patient Active Problem List    Diagnosis Date Noted    Hyperglycemia 09/27/2021     Priority: Medium    Elevated prostate specific antigen (PSA) 08/14/2020     Priority: Medium    High risk medication use 04/20/2018     Priority: Medium    Positive anti-CCP test 04/20/2018     Priority: Medium    Esophageal reflux 01/30/2018     Priority: Medium    Hyperlipidemia 01/30/2018     Priority: Medium    ED (erectile dysfunction) 07/02/2014     Priority: Medium    Essential hypertension 03/25/2013     Priority: Medium    Rheumatoid arthritis of multiple sites without rheumatoid factor (H) 09/06/2011     Priority: Medium      Past Medical History:   Diagnosis Date    Equivocal stress test     Cardiolyte    Essential (primary) hypertension     No Comments Provided    Gastro-esophageal reflux disease without esophagitis     No Comments Provided    Hyperlipidemia     No Comments Provided    Male erectile dysfunction     7/2/2014    Rheumatoid arthritis (H)     No Comments Provided    Tubular adenoma       Past Surgical History:   Procedure Laterality Date    ARTHROSCOPY KNEE Left     No Comments Provided    ARTHROSCOPY SHOULDER Left     2011    ARTHROTOMY WRIST Left     Wrist surgery with fusion.    COLONOSCOPY  2009,Normal screening colonoscopy, follow up recommended in 10 years    COLONOSCOPY N/A 2019    tubular adenoma, 5 year follow up    ELBOW SURGERY Right     Right elbow surgery for bone spurs/chips    ESOPHAGOSCOPY, GASTROSCOPY, DUODENOSCOPY (EGD), COMBINED      2015,[facility]    VASECTOMY      No Comments Provided     Current Outpatient Medications   Medication Sig Dispense Refill    aspirin (ASA) 81 MG tablet Take 1 tablet (81 mg) by mouth daily      folic acid (FOLVITE) 1 MG tablet Take 1 tablet (1,000 mcg) by mouth daily 90 tablet 3    lisinopril-hydrochlorothiazide (ZESTORETIC) 20-25 MG tablet Take 1 tablet by mouth daily 90 tablet 3    meloxicam (MOBIC) 7.5 MG tablet Take 1 tablet (7.5 mg) by mouth daily 90 tablet 4    methotrexate 2.5 MG tablet Take 6 tablets (15 mg) by mouth once a week 72 tablet 4    omeprazole (PRILOSEC) 20 MG DR capsule Take 1 capsule (20 mg) by mouth daily 90 capsule 4    tadalafil (CIALIS) 5 MG tablet Take 5 mg by mouth TAKE 1-4 PILLS BY MOUTH ONE HOUR PRIOR TO DESIRED EFFECT AS NEEDED. DO NOT EXCEED 4 PILLS WITHIN A 24 HOUR PERIOD      tamsulosin (FLOMAX) 0.4 MG capsule Take 1 capsule (0.4 mg) by mouth daily Take after supper prior to bedtime.  Watch for dizziness upon standing 30 capsule 11       No Known Allergies     Social History     Tobacco Use    Smoking status: Never    Smokeless tobacco: Never   Substance Use Topics    Alcohol use: Yes     Alcohol/week: 5.0 standard drinks of alcohol     Comment: 1-2 times a week     Family History   Problem Relation Age of Onset    Other - See Comments Father          of some type of aneurysm.    Hypertension Mother          from old age    Cancer Daughter         Cancer,Leukemia     History    Drug Use    Types: Marijuana     Comment: gummy daily             Review of Systems  Constitutional, HEENT, cardiovascular, pulmonary, GI, , musculoskeletal, neuro, skin, endocrine and psych systems are negative, except as otherwise noted.    Objective    /64 (BP Location: Right arm, Patient Position: Sitting, Cuff Size: Adult Large)   Pulse 96   Temp 97.4  F (36.3  C) (Temporal)   Resp 18   Wt 92.1 kg (203 lb)   SpO2 98%   BMI 27.53 kg/m     Estimated body mass index is 27.53 kg/m  as calculated from the following:    Height as of 7/23/24: 1.829 m (6').    Weight as of this encounter: 92.1 kg (203 lb).  Physical Exam  GENERAL: alert and no distress  EYES: Eyes grossly normal to inspection, PERRL and conjunctivae and sclerae normal  HENT: ear canals and TM's normal, nose and mouth without ulcers or lesions  NECK: no adenopathy, no asymmetry, masses, or scars  RESP: lungs clear to auscultation - no rales, rhonchi or wheezes  CV: regular rate and rhythm, normal S1 S2, no S3 or S4, no murmur, click or rub, no peripheral edema  ABDOMEN: soft, nontender, no hepatosplenomegaly, no masses and bowel sounds normal  MS: no gross musculoskeletal defects noted, no edema  SKIN: no suspicious lesions or rashes  NEURO: Normal strength and tone, mentation intact and speech normal  PSYCH: mentation appears normal, affect normal/bright    Recent Labs   Lab Test 07/23/24  1400 05/22/24  0935   HGB 13.9 16.0   PLT  --  182   * 133*   POTASSIUM 3.9 4.4   CR 1.09 1.30*        Diagnostics  Results for orders placed or performed in visit on 12/17/24   Basic Metabolic Panel     Status: Abnormal   Result Value Ref Range    Sodium 135 135 - 145 mmol/L    Potassium 4.1 3.4 - 5.3 mmol/L    Chloride 98 98 - 107 mmol/L    Carbon Dioxide (CO2) 31 (H) 22 - 29 mmol/L    Anion Gap 6 (L) 7 - 15 mmol/L    Urea Nitrogen 8.4 8.0 - 23.0 mg/dL    Creatinine 1.06 0.67 - 1.17 mg/dL    GFR Estimate 77 >60 mL/min/1.73m2    Calcium 9.1 8.8  - 10.4 mg/dL    Glucose 118 (H) 70 - 99 mg/dL   CBC with platelets and differential     Status: None   Result Value Ref Range    WBC Count 5.9 4.0 - 11.0 10e3/uL    RBC Count 4.77 4.40 - 5.90 10e6/uL    Hemoglobin 15.1 13.3 - 17.7 g/dL    Hematocrit 44.2 40.0 - 53.0 %    MCV 93 78 - 100 fL    MCH 31.7 26.5 - 33.0 pg    MCHC 34.2 31.5 - 36.5 g/dL    RDW 14.2 10.0 - 15.0 %    Platelet Count 215 150 - 450 10e3/uL    % Neutrophils 70 %    % Lymphocytes 17 %    % Monocytes 8 %    % Eosinophils 4 %    % Basophils 1 %    % Immature Granulocytes 1 %    NRBCs per 100 WBC 0 <1 /100    Absolute Neutrophils 4.1 1.6 - 8.3 10e3/uL    Absolute Lymphocytes 1.0 0.8 - 5.3 10e3/uL    Absolute Monocytes 0.5 0.0 - 1.3 10e3/uL    Absolute Eosinophils 0.2 0.0 - 0.7 10e3/uL    Absolute Basophils 0.1 0.0 - 0.2 10e3/uL    Absolute Immature Granulocytes 0.0 <=0.4 10e3/uL    Absolute NRBCs 0.0 10e3/uL   EKG 12-lead, tracing only (Same Day)     Status: None (Preliminary result)   Result Value Ref Range    Systolic Blood Pressure  mmHg    Diastolic Blood Pressure  mmHg    Ventricular Rate 79 BPM    Atrial Rate 79 BPM    NY Interval 148 ms    QRS Duration 126 ms     ms    QTc 481 ms    P Axis 54 degrees    R AXIS 24 degrees    T Axis 29 degrees    Interpretation ECG       Sinus rhythm  Right bundle branch block  Abnormal ECG  When compared with ECG of 22-May-2024 09:14,  Right bundle branch block is now Present     CBC and Differential     Status: None    Narrative    The following orders were created for panel order CBC and Differential.  Procedure                               Abnormality         Status                     ---------                               -----------         ------                     CBC with platelets and d...[[id number]]                      Final result                 Please view results for these tests on the individual orders.         Revised Cardiac Risk Index (RCRI)  The patient has the following serious  cardiovascular risks for perioperative complications:   - No serious cardiac risks = 0 points     RCRI Interpretation: 0 points: Class I (very low risk - 0.4% complication rate)       Signed Electronically by: NIELS WALTERS CNP  A copy of this evaluation report is provided to the requesting physician.

## 2024-12-23 ENCOUNTER — ALLIED HEALTH/NURSE VISIT (OUTPATIENT)
Dept: FAMILY MEDICINE | Facility: OTHER | Age: 64
End: 2024-12-23
Payer: MEDICARE

## 2024-12-23 DIAGNOSIS — Z01.818 PREOP GENERAL PHYSICAL EXAM: Primary | ICD-10-CM

## 2024-12-23 LAB
MRSA DNA SPEC QL NAA+PROBE: NEGATIVE
SA TARGET DNA: POSITIVE

## 2024-12-23 PROCEDURE — 87641 MR-STAPH DNA AMP PROBE: CPT | Mod: ZL

## 2024-12-23 NOTE — PROGRESS NOTES
Preop mrsa and mssa swab    iSxto Valencia RN on 12/23/2024 at 8:18 AM      After lab result come back results need to be faxed to [facility] @[phone]

## 2025-01-08 ENCOUNTER — TRANSFERRED RECORDS (OUTPATIENT)
Dept: HEALTH INFORMATION MANAGEMENT | Facility: OTHER | Age: 65
End: 2025-01-08

## 2025-01-10 ENCOUNTER — TRANSFERRED RECORDS (OUTPATIENT)
Dept: HEALTH INFORMATION MANAGEMENT | Facility: OTHER | Age: 65
End: 2025-01-10

## 2025-01-11 ENCOUNTER — TRANSFERRED RECORDS (OUTPATIENT)
Dept: HEALTH INFORMATION MANAGEMENT | Facility: OTHER | Age: 65
End: 2025-01-11

## 2025-01-12 ENCOUNTER — HEALTH MAINTENANCE LETTER (OUTPATIENT)
Age: 65
End: 2025-01-12

## 2025-01-16 ENCOUNTER — HOSPITAL ENCOUNTER (OUTPATIENT)
Dept: INFUSION THERAPY | Facility: OTHER | Age: 65
End: 2025-01-16
Payer: MEDICARE

## 2025-01-16 DIAGNOSIS — M00.9 INFECTION OF LEFT KNEE (H): Primary | ICD-10-CM

## 2025-01-16 LAB
ALP SERPL-CCNC: 82 U/L (ref 40–150)
ALT SERPL W P-5'-P-CCNC: 24 U/L (ref 0–70)
AST SERPL W P-5'-P-CCNC: 26 U/L (ref 0–45)
BASOPHILS # BLD AUTO: 0.1 10E3/UL (ref 0–0.2)
BASOPHILS NFR BLD AUTO: 1 %
BILIRUB DIRECT SERPL-MCNC: 0.22 MG/DL (ref 0–0.3)
BILIRUB SERPL-MCNC: 0.8 MG/DL
BUN SERPL-MCNC: 9.8 MG/DL (ref 8–23)
CK SERPL-CCNC: 31 U/L (ref 39–308)
CREAT SERPL-MCNC: 1.14 MG/DL (ref 0.67–1.17)
CRP SERPL-MCNC: 4 MG/L
EGFRCR SERPLBLD CKD-EPI 2021: 71 ML/MIN/1.73M2
EOSINOPHIL # BLD AUTO: 0.2 10E3/UL (ref 0–0.7)
EOSINOPHIL NFR BLD AUTO: 3 %
ERYTHROCYTE [DISTWIDTH] IN BLOOD BY AUTOMATED COUNT: 13.8 % (ref 10–15)
HCT VFR BLD AUTO: 30.7 % (ref 40–53)
HGB BLD-MCNC: 10.8 G/DL (ref 13.3–17.7)
IMM GRANULOCYTES # BLD: 0 10E3/UL
IMM GRANULOCYTES NFR BLD: 1 %
LYMPHOCYTES # BLD AUTO: 1.3 10E3/UL (ref 0.8–5.3)
LYMPHOCYTES NFR BLD AUTO: 20 %
MCH RBC QN AUTO: 32.5 PG (ref 26.5–33)
MCHC RBC AUTO-ENTMCNC: 35.2 G/DL (ref 31.5–36.5)
MCV RBC AUTO: 93 FL (ref 78–100)
MONOCYTES # BLD AUTO: 0.7 10E3/UL (ref 0–1.3)
MONOCYTES NFR BLD AUTO: 11 %
NEUTROPHILS # BLD AUTO: 4.2 10E3/UL (ref 1.6–8.3)
NEUTROPHILS NFR BLD AUTO: 65 %
NRBC # BLD AUTO: 0 10E3/UL
NRBC BLD AUTO-RTO: 0 /100
PLATELET # BLD AUTO: 309 10E3/UL (ref 150–450)
RBC # BLD AUTO: 3.32 10E6/UL (ref 4.4–5.9)
VANCOMYCIN SERPL-MCNC: <4 UG/ML
WBC # BLD AUTO: 6.6 10E3/UL (ref 4–11)

## 2025-01-16 PROCEDURE — 85004 AUTOMATED DIFF WBC COUNT: CPT

## 2025-01-16 PROCEDURE — 82248 BILIRUBIN DIRECT: CPT

## 2025-01-16 PROCEDURE — 84460 ALANINE AMINO (ALT) (SGPT): CPT

## 2025-01-16 PROCEDURE — 84075 ASSAY ALKALINE PHOSPHATASE: CPT

## 2025-01-16 PROCEDURE — 86140 C-REACTIVE PROTEIN: CPT

## 2025-01-16 PROCEDURE — 82550 ASSAY OF CK (CPK): CPT

## 2025-01-16 PROCEDURE — 82565 ASSAY OF CREATININE: CPT

## 2025-01-16 PROCEDURE — 82247 BILIRUBIN TOTAL: CPT

## 2025-01-16 PROCEDURE — 84520 ASSAY OF UREA NITROGEN: CPT

## 2025-01-16 PROCEDURE — 84450 TRANSFERASE (AST) (SGOT): CPT

## 2025-01-16 PROCEDURE — 80202 ASSAY OF VANCOMYCIN: CPT

## 2025-01-16 PROCEDURE — 36591 DRAW BLOOD OFF VENOUS DEVICE: CPT

## 2025-01-16 NOTE — NURSING NOTE
Patient arrived to infusion for PICC line dressing change and lab. 4 vials of blood drawn and sent to lab. Old dressing removed and discarded. Site clean, dry and intact without redness, swelling of indication of complications. Site cleansed with Chlorhexidine per protocol. New sterile dressing applied with securement device and site measured. New clave attached to line. Blood return noted post dressing change. Flushed with NS and new curo cap applied. Patient will return next Thursday for next weekly dressing change. Patients labs will be faxed by Jyoti BARRIOS RN when resulted to ID.  Georgina Chavira RN on 1/16/2025 at 3:35 PM

## 2025-01-16 NOTE — NURSING NOTE
Today's lab results faxed via Right[employer]ax to number listed in therapy plan, attn ID dept. Jyoti Tai RN ....................  1/16/2025   4:12 PM

## 2025-01-23 ENCOUNTER — HOSPITAL ENCOUNTER (OUTPATIENT)
Dept: INFUSION THERAPY | Facility: OTHER | Age: 65
End: 2025-01-23
Payer: MEDICARE

## 2025-01-23 DIAGNOSIS — M00.9 INFECTION OF LEFT KNEE (H): Primary | ICD-10-CM

## 2025-01-23 LAB
ALP SERPL-CCNC: 90 U/L (ref 40–150)
ALT SERPL W P-5'-P-CCNC: 12 U/L (ref 0–70)
AST SERPL W P-5'-P-CCNC: 21 U/L (ref 0–45)
BASOPHILS # BLD AUTO: 0.1 10E3/UL (ref 0–0.2)
BASOPHILS NFR BLD AUTO: 1 %
BILIRUB DIRECT SERPL-MCNC: <0.2 MG/DL (ref 0–0.3)
BILIRUB SERPL-MCNC: 0.4 MG/DL
BUN SERPL-MCNC: 12.2 MG/DL (ref 8–23)
CK SERPL-CCNC: 42 U/L (ref 39–308)
CREAT SERPL-MCNC: 1.27 MG/DL (ref 0.67–1.17)
CRP SERPL-MCNC: 4.36 MG/L
EGFRCR SERPLBLD CKD-EPI 2021: 62 ML/MIN/1.73M2
EOSINOPHIL # BLD AUTO: 0.5 10E3/UL (ref 0–0.7)
EOSINOPHIL NFR BLD AUTO: 7 %
ERYTHROCYTE [DISTWIDTH] IN BLOOD BY AUTOMATED COUNT: 13.6 % (ref 10–15)
HCT VFR BLD AUTO: 31.8 % (ref 40–53)
HGB BLD-MCNC: 11.1 G/DL (ref 13.3–17.7)
IMM GRANULOCYTES # BLD: 0 10E3/UL
IMM GRANULOCYTES NFR BLD: 1 %
LYMPHOCYTES # BLD AUTO: 1.3 10E3/UL (ref 0.8–5.3)
LYMPHOCYTES NFR BLD AUTO: 21 %
MCH RBC QN AUTO: 31.7 PG (ref 26.5–33)
MCHC RBC AUTO-ENTMCNC: 34.9 G/DL (ref 31.5–36.5)
MCV RBC AUTO: 91 FL (ref 78–100)
MONOCYTES # BLD AUTO: 0.6 10E3/UL (ref 0–1.3)
MONOCYTES NFR BLD AUTO: 9 %
NEUTROPHILS # BLD AUTO: 3.8 10E3/UL (ref 1.6–8.3)
NEUTROPHILS NFR BLD AUTO: 61 %
NRBC # BLD AUTO: 0 10E3/UL
NRBC BLD AUTO-RTO: 0 /100
PLATELET # BLD AUTO: 285 10E3/UL (ref 150–450)
RBC # BLD AUTO: 3.5 10E6/UL (ref 4.4–5.9)
VANCOMYCIN SERPL-MCNC: <4 UG/ML
WBC # BLD AUTO: 6.3 10E3/UL (ref 4–11)

## 2025-01-23 PROCEDURE — 82565 ASSAY OF CREATININE: CPT

## 2025-01-23 PROCEDURE — 36592 COLLECT BLOOD FROM PICC: CPT

## 2025-01-23 PROCEDURE — 82248 BILIRUBIN DIRECT: CPT

## 2025-01-23 PROCEDURE — 84450 TRANSFERASE (AST) (SGOT): CPT

## 2025-01-23 PROCEDURE — 86140 C-REACTIVE PROTEIN: CPT

## 2025-01-23 PROCEDURE — 85004 AUTOMATED DIFF WBC COUNT: CPT

## 2025-01-23 PROCEDURE — 82550 ASSAY OF CK (CPK): CPT

## 2025-01-23 PROCEDURE — 84460 ALANINE AMINO (ALT) (SGPT): CPT

## 2025-01-23 PROCEDURE — 80202 ASSAY OF VANCOMYCIN: CPT

## 2025-01-23 PROCEDURE — 84075 ASSAY ALKALINE PHOSPHATASE: CPT

## 2025-01-23 PROCEDURE — 84520 ASSAY OF UREA NITROGEN: CPT

## 2025-01-23 NOTE — NURSING NOTE
Patient arrives today for PICC line dressing change and labs. 4 vials of blood drawn and sent to lab. Old dressing was removed and discarded. Site is without redness, swelling, exudate, or indication of complications. Patient denies discomfort. Per sterile technique, site area was cleaned with chlorhexidine scrub for 30 seconds and allowed to air dry. New chlorhexidine patch and securement device applied and covered with new transparent dressing. Measurements in flow sheets. Line flushed with normal saline, brisk blood return noted. Line locked. Clave changed. New Curos cap applied. Labs faxed to ID dept per orders via [employer]. Jyoti Tai RN ....................  1/23/2025   3:25 PM

## 2025-01-27 ENCOUNTER — OFFICE VISIT (OUTPATIENT)
Dept: FAMILY MEDICINE | Facility: OTHER | Age: 65
End: 2025-01-27
Attending: STUDENT IN AN ORGANIZED HEALTH CARE EDUCATION/TRAINING PROGRAM
Payer: MEDICARE

## 2025-01-27 VITALS
RESPIRATION RATE: 16 BRPM | BODY MASS INDEX: 26.03 KG/M2 | TEMPERATURE: 97.9 F | HEART RATE: 70 BPM | WEIGHT: 196.4 LBS | DIASTOLIC BLOOD PRESSURE: 67 MMHG | HEIGHT: 73 IN | SYSTOLIC BLOOD PRESSURE: 101 MMHG | OXYGEN SATURATION: 97 %

## 2025-01-27 DIAGNOSIS — G47.9 SLEEP DIFFICULTIES: ICD-10-CM

## 2025-01-27 DIAGNOSIS — K21.9 GASTROESOPHAGEAL REFLUX DISEASE, UNSPECIFIED WHETHER ESOPHAGITIS PRESENT: ICD-10-CM

## 2025-01-27 DIAGNOSIS — R13.10 DYSPHAGIA, UNSPECIFIED TYPE: Primary | ICD-10-CM

## 2025-01-27 DIAGNOSIS — T84.50XA INFECTION OF PROSTHETIC JOINT, INITIAL ENCOUNTER: Primary | ICD-10-CM

## 2025-01-27 PROCEDURE — 99207 PR NO CHARGE LOS: CPT | Performed by: STUDENT IN AN ORGANIZED HEALTH CARE EDUCATION/TRAINING PROGRAM

## 2025-01-27 PROCEDURE — G0463 HOSPITAL OUTPT CLINIC VISIT: HCPCS

## 2025-01-27 PROCEDURE — 3074F SYST BP LT 130 MM HG: CPT | Performed by: STUDENT IN AN ORGANIZED HEALTH CARE EDUCATION/TRAINING PROGRAM

## 2025-01-27 PROCEDURE — 1125F AMNT PAIN NOTED PAIN PRSNT: CPT | Performed by: STUDENT IN AN ORGANIZED HEALTH CARE EDUCATION/TRAINING PROGRAM

## 2025-01-27 PROCEDURE — 3078F DIAST BP <80 MM HG: CPT | Performed by: STUDENT IN AN ORGANIZED HEALTH CARE EDUCATION/TRAINING PROGRAM

## 2025-01-27 RX ORDER — SUCRALFATE ORAL 1 G/10ML
1 SUSPENSION ORAL 4 TIMES DAILY
Qty: 473 ML | Refills: 1 | Status: SHIPPED | OUTPATIENT
Start: 2025-01-27

## 2025-01-27 RX ORDER — TRAZODONE HYDROCHLORIDE 50 MG/1
50 TABLET, FILM COATED ORAL AT BEDTIME
Qty: 30 TABLET | Refills: 0 | Status: SHIPPED | OUTPATIENT
Start: 2025-01-27

## 2025-01-27 RX ORDER — PANTOPRAZOLE SODIUM 40 MG/1
40 TABLET, DELAYED RELEASE ORAL 2 TIMES DAILY
Qty: 60 TABLET | Refills: 1 | Status: SHIPPED | OUTPATIENT
Start: 2025-01-27

## 2025-01-27 ASSESSMENT — PAIN SCALES - GENERAL: PAINLEVEL_OUTOF10: MILD PAIN (2)

## 2025-01-27 NOTE — PROGRESS NOTES
"  {PROVIDER CHARTING PREFERENCE:588726}    Celina Sands is a 64 year old, presenting for the following health issues:  Heartburn (Symptoms started on 1/9/25)      1/27/2025     2:52 PM   Additional Questions   Roomed by Tata RANKIN   Accompanied by self         1/27/2025     2:52 PM   Patient Reported Additional Medications   Patient reports taking the following new medications Tylenol 500mg - started on 1/8/25 with Dr. Blanchard at [facility] in [place], Antibiotic started on 1/9/25 also with Dr. Blanchard     History of Present Illness       Reason for visit:  Sleep swalling heartburn   He is taking medications regularly.       {MA/LPN/RN Pre-Provider Visit Orders- hCG/UA/Strep (Optional):108514}  {SUPERLIST (Optional):256329}  {additonal problems for provider to add (Optional):942275}    {ROS Picklists (Optional):415445}      Objective    /67 (BP Location: Left arm, Patient Position: Sitting, Cuff Size: Adult Large)   Pulse 70   Temp 97.9  F (36.6  C) (Temporal)   Resp 16   Ht 1.842 m (6' 0.5\")   Wt 89.1 kg (196 lb 6.4 oz)   SpO2 97%   BMI 26.27 kg/m    Body mass index is 26.27 kg/m .  Physical Exam   {Exam List (Optional):518861}    {Diagnostic Test Results (Optional):156817}        Signed Electronically by: Minh Espitia DO  {Email feedback regarding this note to [email or web address]   :698555}  "

## 2025-01-27 NOTE — NURSING NOTE
"Chief Complaint   Patient presents with    Heartburn     Symptoms started on 1/9/25       Initial /67 (BP Location: Left arm, Patient Position: Sitting, Cuff Size: Adult Large)   Pulse 70   Temp 97.9  F (36.6  C) (Temporal)   Resp 16   Ht 1.842 m (6' 0.5\")   Wt 89.1 kg (196 lb 6.4 oz)   SpO2 97%   BMI 26.27 kg/m   Estimated body mass index is 26.27 kg/m  as calculated from the following:    Height as of this encounter: 1.842 m (6' 0.5\").    Weight as of this encounter: 89.1 kg (196 lb 6.4 oz).  Medication Review: complete    The next two questions are to help us understand your food security.  If you are feeling you need any assistance in this area, we have resources available to support you today.          11/3/2023   SDOH- Food Insecurity   Within the past 12 months, did you worry that your food would run out before you got money to buy more? N   Within the past 12 months, did the food you bought just not last and you didn t have money to get more? N         Health Care Directive:  Patient does not have a Health Care Directive: Patient given directive and to return when completed.    Tata Chirinos      "

## 2025-01-28 ENCOUNTER — THERAPY VISIT (OUTPATIENT)
Dept: PHYSICAL THERAPY | Facility: OTHER | Age: 65
End: 2025-01-28
Payer: COMMERCIAL

## 2025-01-28 DIAGNOSIS — T84.50XA INFECTION OF PROSTHETIC JOINT, INITIAL ENCOUNTER: ICD-10-CM

## 2025-01-28 NOTE — PROGRESS NOTES
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  Patient complains pain is only between 2 and 3 out of 10 describes as burning all the time having some sort of discomfort but worse with prolonged sitting and prolonged walking.  Enjoys outdoor activities and has part-time job of driving bus and referees basketball games.         Presenting condition or subjective complaint: Decreased strength and range of motion after total knee revision  Date of onset: 01/08/25    Relevant medical history:     Dates & types of surgery:      Prior diagnostic imaging/testing results:       Prior therapy history for the same diagnosis, illness or injury: Yes      Prior Level of Function  Transfers: Independent  Ambulation:   ADL:   IADL:     Living Environment  Social support: With a significant other or spouse   Type of home:     Stairs to enter the home: Yes 4     Ramp:     Stairs inside the home:         Help at home:    Equipment owned:       Employment:   Semiretired  Hobbies/Interests: Golf, hunt, fish, referee basketball    Patient goals for therapy: Hunt, fish, work part-time, referee basketball,and golf    Pain assessment:      Objective   KNEE EVALUATION  PAIN:   INTEGUMENTARY (edema, incisions): Incisions still has sutures in it but looks clean and healing well  POSTURE:   GAIT:  Weightbearing Status: WBAT  Assistive Device(s): Cane (single end)  Gait Deviations:   BALANCE/PROPRIOCEPTION: WFL  WEIGHTBEARING ALIGNMENT: WFL  NON-WEIGHTBEARING ALIGNMENT:   ROM: +5-110    STRENGTH: 4 - for quadricep 4/5 for hips/gluteals  FLEXIBILITY:   SPECIAL TESTS:   FUNCTIONAL TESTS:   PALPATION: Good  patellar mobility and scar mobility  JOINT MOBILITY:     Assessment & Plan   CLINICAL IMPRESSIONS  Medical Diagnosis: Infection of prosthetic joint, initial encounter (T84.50XA)    Treatment Diagnosis: Decreased range of motion decreased strength status post revision total knee   Impression/Assessment: Patient is a 64 year old male with knee complaints.  The following significant findings have been identified: Pain, Decreased ROM/flexibility, Decreased joint mobility, Decreased strength, Impaired gait, Impaired muscle performance, and Decreased activity tolerance. These impairments interfere with their ability to perform self care tasks, work tasks, recreational activities, household mobility, and community mobility as compared to previous level of function.     Clinical Decision Making (Complexity):  Clinical Presentation: Evolving/Changing  Clinical Presentation Rationale: based on medical and personal factors listed in PT evaluation  Clinical Decision Making (Complexity): Moderate complexity    PLAN OF CARE  Treatment Interventions:  Modalities: Cryotherapy, Cupping, E-stim, Hot Pack  Interventions: Gait Training, Manual Therapy, Neuromuscular Re-education, Therapeutic Activity, Therapeutic Exercise    Long Term Goals     PT Goal 1  Goal Identifier: Range of motion  Goal Description: Patient will have improved range of motion of 0 to 120 degrees to better tolerate all activities of daily living  Target Date: 03/11/25  PT Goal 2  Goal Identifier: Strength  Goal Description: Quad strength will improve to 5 out of 5 to tolerate running to return to recreational activities  Target Date: 05/20/25  PT Goal 3  Goal Identifier: Stairs  Goal Description: Patient will have reciprocal gait pattern on stairs  Target Date: 03/25/25      Frequency of Treatment: 1 to 2 times a week  Duration of Treatment: 16 weeks    Recommended Referrals to Other Professionals:   Education Assessment:        Risks and benefits of  evaluation/treatment have been explained.   Patient/Family/caregiver agrees with Plan of Care.     Evaluation Time:     PT Eval, Moderate Complexity Minutes (41957): 25       Signing Clinician: MILAD Dillard [facility]                                                                                   OUTPATIENT PHYSICAL THERAPY      PLAN OF TREATMENT FOR OUTPATIENT REHABILITATION   Patient's Last Name, First Name, Wicho Figueroa YOB: 1960   Provider's Name   [facility]   Medical Record No.  [id number]     Onset Date: 01/08/25  Start of Care Date: 01/28/25     Medical Diagnosis:  Infection of prosthetic joint, initial encounter (T84.50XA)      PT Treatment Diagnosis:  Decreased range of motion decreased strength status post revision total knee Plan of Treatment  Frequency/Duration: 1 to 2 times a week/ 16 weeks    Certification date from 01/28/25 to 04/22/25         See note for plan of treatment details and functional goals     Rios Hernandez, MILAD                         I CERTIFY THE NEED FOR THESE SERVICES FURNISHED UNDER        THIS PLAN OF TREATMENT AND WHILE UNDER MY CARE .             Physician Signature               Date    X_____________________________________________________                  Referring Provider:  Provider Not In System    Initial Assessment  See Epic Evaluation- Start of Care Date: 01/28/25

## 2025-01-30 ENCOUNTER — THERAPY VISIT (OUTPATIENT)
Dept: PHYSICAL THERAPY | Facility: OTHER | Age: 65
End: 2025-01-30
Payer: MEDICARE

## 2025-01-30 ENCOUNTER — HOSPITAL ENCOUNTER (OUTPATIENT)
Dept: INFUSION THERAPY | Facility: OTHER | Age: 65
End: 2025-01-30
Payer: MEDICARE

## 2025-01-30 DIAGNOSIS — T84.50XA INFECTION OF PROSTHETIC JOINT, INITIAL ENCOUNTER: Primary | ICD-10-CM

## 2025-01-30 DIAGNOSIS — M00.9 INFECTION OF LEFT KNEE (H): Primary | ICD-10-CM

## 2025-01-30 LAB
ALP SERPL-CCNC: 91 U/L (ref 40–150)
ALT SERPL W P-5'-P-CCNC: 11 U/L (ref 0–70)
AST SERPL W P-5'-P-CCNC: 20 U/L (ref 0–45)
BASOPHILS # BLD AUTO: 0 10E3/UL (ref 0–0.2)
BASOPHILS NFR BLD AUTO: 0 %
BILIRUB DIRECT SERPL-MCNC: 0.3 MG/DL (ref 0–0.3)
BILIRUB SERPL-MCNC: 0.9 MG/DL
BUN SERPL-MCNC: 17.5 MG/DL (ref 8–23)
CK SERPL-CCNC: 42 U/L (ref 39–308)
CREAT SERPL-MCNC: 1.49 MG/DL (ref 0.67–1.17)
CRP SERPL-MCNC: 103.75 MG/L
EGFRCR SERPLBLD CKD-EPI 2021: 51 ML/MIN/1.73M2
EOSINOPHIL # BLD AUTO: 0.7 10E3/UL (ref 0–0.7)
EOSINOPHIL NFR BLD AUTO: 7 %
ERYTHROCYTE [DISTWIDTH] IN BLOOD BY AUTOMATED COUNT: 12.7 % (ref 10–15)
HCT VFR BLD AUTO: 30.1 % (ref 40–53)
HGB BLD-MCNC: 11.1 G/DL (ref 13.3–17.7)
IMM GRANULOCYTES # BLD: 0.1 10E3/UL
IMM GRANULOCYTES NFR BLD: 1 %
LYMPHOCYTES # BLD AUTO: 1 10E3/UL (ref 0.8–5.3)
LYMPHOCYTES NFR BLD AUTO: 10 %
MCH RBC QN AUTO: 32 PG (ref 26.5–33)
MCHC RBC AUTO-ENTMCNC: 36.9 G/DL (ref 31.5–36.5)
MCV RBC AUTO: 87 FL (ref 78–100)
MONOCYTES # BLD AUTO: 0.8 10E3/UL (ref 0–1.3)
MONOCYTES NFR BLD AUTO: 8 %
NEUTROPHILS # BLD AUTO: 7.1 10E3/UL (ref 1.6–8.3)
NEUTROPHILS NFR BLD AUTO: 74 %
NRBC # BLD AUTO: 0 10E3/UL
NRBC BLD AUTO-RTO: 0 /100
PLATELET # BLD AUTO: 281 10E3/UL (ref 150–450)
RBC # BLD AUTO: 3.47 10E6/UL (ref 4.4–5.9)
VANCOMYCIN SERPL-MCNC: <4 UG/ML
WBC # BLD AUTO: 9.6 10E3/UL (ref 4–11)

## 2025-01-30 PROCEDURE — 85041 AUTOMATED RBC COUNT: CPT

## 2025-01-30 PROCEDURE — 82248 BILIRUBIN DIRECT: CPT

## 2025-01-30 PROCEDURE — 85004 AUTOMATED DIFF WBC COUNT: CPT

## 2025-01-30 PROCEDURE — 84075 ASSAY ALKALINE PHOSPHATASE: CPT

## 2025-01-30 PROCEDURE — 85014 HEMATOCRIT: CPT

## 2025-01-30 PROCEDURE — 80202 ASSAY OF VANCOMYCIN: CPT

## 2025-01-30 PROCEDURE — 82565 ASSAY OF CREATININE: CPT

## 2025-01-30 PROCEDURE — 36592 COLLECT BLOOD FROM PICC: CPT

## 2025-01-30 PROCEDURE — 84450 TRANSFERASE (AST) (SGOT): CPT

## 2025-01-30 PROCEDURE — 84460 ALANINE AMINO (ALT) (SGPT): CPT

## 2025-01-30 PROCEDURE — 82247 BILIRUBIN TOTAL: CPT

## 2025-01-30 PROCEDURE — 82550 ASSAY OF CK (CPK): CPT

## 2025-01-30 PROCEDURE — 84520 ASSAY OF UREA NITROGEN: CPT

## 2025-01-30 PROCEDURE — 86140 C-REACTIVE PROTEIN: CPT

## 2025-01-30 NOTE — NURSING NOTE
Patient arrives today for PICC line dressing change and labs. 4 vials of blood drawn and sent to lab. Old dressing was removed and discarded. Site is without redness, swelling, exudate, or indication of complications. Patient denies discomfort. Per sterile technique, site area was cleaned with chlorhexidine scrub for 30 seconds and allowed to air dry. New chlorhexidine patch and securement device applied and covered with new transparent dressing. Measurements in flow sheets. Line flushed with normal saline, brisk blood return noted. Line locked, clave changed. New Curos cap applied.     Pt notes that he is feeling poorly today, is feeling nauseated, weak, and fatigued. Denies any fevers or chills. States he was seen in clinic on 1/27/25 to address nausea, but provider note from 1/27/25 is not completed so writer is unable to see what was concluded at this visit. Labs faxed to [facility] as per orders. Call also placed to Christiana with the nursing team at [facility] to update her that pt's CRP had significantly spiked and that his kidney function was slightly elevated as well. She verbalized that she was able to view his lab results in EPIC and would follow up with patient as he had also recently called them to report that he was feeling unwell. Pt was instructed by writer before he left to return to ED with any new or worsening symptoms. He verbalized understanding. Jyoti Tai RN ....................  1/30/2025   3:59 PM

## 2025-02-04 ENCOUNTER — THERAPY VISIT (OUTPATIENT)
Dept: PHYSICAL THERAPY | Facility: OTHER | Age: 65
End: 2025-02-04
Payer: MEDICARE

## 2025-02-04 ENCOUNTER — HOSPITAL ENCOUNTER (OUTPATIENT)
Dept: INFUSION THERAPY | Facility: OTHER | Age: 65
Discharge: HOME | End: 2025-02-04
Payer: MEDICARE

## 2025-02-04 DIAGNOSIS — M00.9 INFECTION OF LEFT KNEE (H): Primary | ICD-10-CM

## 2025-02-04 DIAGNOSIS — T84.50XA INFECTION OF PROSTHETIC JOINT, INITIAL ENCOUNTER: Primary | ICD-10-CM

## 2025-02-04 LAB
ALP SERPL-CCNC: 84 U/L (ref 40–150)
ALT SERPL W P-5'-P-CCNC: 17 U/L (ref 0–70)
AST SERPL W P-5'-P-CCNC: 23 U/L (ref 0–45)
BASOPHILS # BLD AUTO: 0.1 10E3/UL (ref 0–0.2)
BASOPHILS NFR BLD AUTO: 1 %
BILIRUB DIRECT SERPL-MCNC: <0.2 MG/DL (ref 0–0.3)
BILIRUB SERPL-MCNC: 0.6 MG/DL
BUN SERPL-MCNC: 15.3 MG/DL (ref 8–23)
CK SERPL-CCNC: 28 U/L (ref 39–308)
CREAT SERPL-MCNC: 1.27 MG/DL (ref 0.67–1.17)
CRP SERPL-MCNC: 46.09 MG/L
EGFRCR SERPLBLD CKD-EPI 2021: 62 ML/MIN/1.73M2
EOSINOPHIL # BLD AUTO: 1 10E3/UL (ref 0–0.7)
EOSINOPHIL NFR BLD AUTO: 13 %
ERYTHROCYTE [DISTWIDTH] IN BLOOD BY AUTOMATED COUNT: 12.4 % (ref 10–15)
HCT VFR BLD AUTO: 30.4 % (ref 40–53)
HGB BLD-MCNC: 10.9 G/DL (ref 13.3–17.7)
HOLD SPECIMEN: NORMAL
IMM GRANULOCYTES # BLD: 0.1 10E3/UL
IMM GRANULOCYTES NFR BLD: 1 %
LYMPHOCYTES # BLD AUTO: 1.2 10E3/UL (ref 0.8–5.3)
LYMPHOCYTES NFR BLD AUTO: 16 %
MCH RBC QN AUTO: 31.7 PG (ref 26.5–33)
MCHC RBC AUTO-ENTMCNC: 35.9 G/DL (ref 31.5–36.5)
MCV RBC AUTO: 88 FL (ref 78–100)
MONOCYTES # BLD AUTO: 0.7 10E3/UL (ref 0–1.3)
MONOCYTES NFR BLD AUTO: 9 %
NEUTROPHILS # BLD AUTO: 4.7 10E3/UL (ref 1.6–8.3)
NEUTROPHILS NFR BLD AUTO: 61 %
NRBC # BLD AUTO: 0 10E3/UL
NRBC BLD AUTO-RTO: 0 /100
PLATELET # BLD AUTO: 340 10E3/UL (ref 150–450)
RBC # BLD AUTO: 3.44 10E6/UL (ref 4.4–5.9)
WBC # BLD AUTO: 7.8 10E3/UL (ref 4–11)

## 2025-02-04 PROCEDURE — 82550 ASSAY OF CK (CPK): CPT

## 2025-02-04 PROCEDURE — 86140 C-REACTIVE PROTEIN: CPT

## 2025-02-04 PROCEDURE — 84460 ALANINE AMINO (ALT) (SGPT): CPT

## 2025-02-04 PROCEDURE — 82248 BILIRUBIN DIRECT: CPT

## 2025-02-04 PROCEDURE — 84520 ASSAY OF UREA NITROGEN: CPT

## 2025-02-04 PROCEDURE — 36415 COLL VENOUS BLD VENIPUNCTURE: CPT

## 2025-02-04 PROCEDURE — 85025 COMPLETE CBC W/AUTO DIFF WBC: CPT

## 2025-02-04 PROCEDURE — 82247 BILIRUBIN TOTAL: CPT

## 2025-02-04 PROCEDURE — 84450 TRANSFERASE (AST) (SGOT): CPT

## 2025-02-04 PROCEDURE — 82565 ASSAY OF CREATININE: CPT

## 2025-02-04 PROCEDURE — 84075 ASSAY ALKALINE PHOSPHATASE: CPT

## 2025-02-04 NOTE — NURSING NOTE
Infusion Nursing Note:  Wicho Hawk presents today for PICC line lab draw.    Patient seen by provider today: No   present during visit today: Not Applicable.    Note: N/A.      Intravenous Access:  Labs drawn without difficulty.  PICC with brisk blood return and labs drawn..    Treatment Conditions:  Not Applicable.      Post Infusion Assessment:        Discharge Plan:   Patient and/or family verbalized understanding of discharge instructions and all questions answered.  Patient discharged in stable condition accompanied by: self.  Departure Mode: Ambulatory.  Patient reports that he is changing to oral ABX and will be back Thursday for labs and potential PICC line removal.      Izabel King RN

## 2025-02-06 ENCOUNTER — HOSPITAL ENCOUNTER (OUTPATIENT)
Dept: INFUSION THERAPY | Facility: OTHER | Age: 65
End: 2025-02-06
Payer: MEDICARE

## 2025-02-06 NOTE — NURSING NOTE
PICC line removed from RUE per facility protocol. [facility] and sent to medical records. Occlusive dressing applied over insertion site, covered with Tegaderm. No signs of infection, bleeding, or bruising noted to area. Catheter tip intact. Pt instructed to keep dressing in place for at least 24 hours and return to ED if any uncontrolled bleeding develops at site. He verbalized understanding. Jyoti Tai RN ....................  2/6/2025   3:23 PM

## 2025-02-07 ENCOUNTER — TRANSFERRED RECORDS (OUTPATIENT)
Dept: HEALTH INFORMATION MANAGEMENT | Facility: OTHER | Age: 65
End: 2025-02-07
Payer: COMMERCIAL

## 2025-02-10 DIAGNOSIS — R97.20 ELEVATED PROSTATE SPECIFIC ANTIGEN (PSA): Primary | ICD-10-CM

## 2025-02-12 ENCOUNTER — LAB (OUTPATIENT)
Dept: LAB | Facility: OTHER | Age: 65
End: 2025-02-12
Attending: NURSE PRACTITIONER
Payer: MEDICARE

## 2025-02-12 ENCOUNTER — TELEPHONE (OUTPATIENT)
Dept: UROLOGY | Facility: OTHER | Age: 65
End: 2025-02-12

## 2025-02-12 DIAGNOSIS — R97.20 ELEVATED PROSTATE SPECIFIC ANTIGEN (PSA): ICD-10-CM

## 2025-02-12 DIAGNOSIS — D07.5 PIN III (PROSTATIC INTRAEPITHELIAL NEOPLASIA III): ICD-10-CM

## 2025-02-12 LAB
BASOPHILS # BLD AUTO: 0.1 10E3/UL (ref 0–0.2)
BASOPHILS NFR BLD AUTO: 1 %
CREAT SERPL-MCNC: 1.46 MG/DL (ref 0.67–1.17)
CRP SERPL-MCNC: <3 MG/L
EGFRCR SERPLBLD CKD-EPI 2021: 53 ML/MIN/1.73M2
EOSINOPHIL # BLD AUTO: 0.6 10E3/UL (ref 0–0.7)
EOSINOPHIL NFR BLD AUTO: 11 %
ERYTHROCYTE [DISTWIDTH] IN BLOOD BY AUTOMATED COUNT: 12.5 % (ref 10–15)
HCT VFR BLD AUTO: 32.6 % (ref 40–53)
HGB BLD-MCNC: 11.3 G/DL (ref 13.3–17.7)
IMM GRANULOCYTES # BLD: 0 10E3/UL
IMM GRANULOCYTES NFR BLD: 0 %
LYMPHOCYTES # BLD AUTO: 1.5 10E3/UL (ref 0.8–5.3)
LYMPHOCYTES NFR BLD AUTO: 27 %
MCH RBC QN AUTO: 31.8 PG (ref 26.5–33)
MCHC RBC AUTO-ENTMCNC: 34.7 G/DL (ref 31.5–36.5)
MCV RBC AUTO: 92 FL (ref 78–100)
MONOCYTES # BLD AUTO: 0.4 10E3/UL (ref 0–1.3)
MONOCYTES NFR BLD AUTO: 8 %
NEUTROPHILS # BLD AUTO: 3 10E3/UL (ref 1.6–8.3)
NEUTROPHILS NFR BLD AUTO: 53 %
NRBC # BLD AUTO: 0 10E3/UL
NRBC BLD AUTO-RTO: 0 /100
PLATELET # BLD AUTO: 304 10E3/UL (ref 150–450)
PSA SERPL DL<=0.01 NG/ML-MCNC: 8.28 NG/ML (ref 0–4.5)
RBC # BLD AUTO: 3.55 10E6/UL (ref 4.4–5.9)
WBC # BLD AUTO: 5.7 10E3/UL (ref 4–11)

## 2025-02-12 PROCEDURE — 36415 COLL VENOUS BLD VENIPUNCTURE: CPT | Mod: ZL

## 2025-02-12 PROCEDURE — 85025 COMPLETE CBC W/AUTO DIFF WBC: CPT | Mod: ZL

## 2025-02-12 PROCEDURE — 82565 ASSAY OF CREATININE: CPT | Mod: ZL

## 2025-02-12 PROCEDURE — 84153 ASSAY OF PSA TOTAL: CPT | Mod: ZL

## 2025-02-12 PROCEDURE — 86140 C-REACTIVE PROTEIN: CPT | Mod: ZL

## 2025-02-19 ENCOUNTER — THERAPY VISIT (OUTPATIENT)
Dept: PHYSICAL THERAPY | Facility: OTHER | Age: 65
End: 2025-02-19
Payer: MEDICARE

## 2025-02-19 ENCOUNTER — LAB (OUTPATIENT)
Dept: LAB | Facility: OTHER | Age: 65
End: 2025-02-19
Attending: NURSE PRACTITIONER
Payer: MEDICARE

## 2025-02-19 DIAGNOSIS — T84.50XA INFECTION OF PROSTHETIC JOINT, INITIAL ENCOUNTER: Primary | ICD-10-CM

## 2025-02-19 DIAGNOSIS — D07.5 PIN III (PROSTATIC INTRAEPITHELIAL NEOPLASIA III): ICD-10-CM

## 2025-02-19 LAB
BASOPHILS # BLD AUTO: 0.1 10E3/UL (ref 0–0.2)
BASOPHILS NFR BLD AUTO: 1 %
CREAT SERPL-MCNC: 1.35 MG/DL (ref 0.67–1.17)
CRP SERPL-MCNC: <3 MG/L
EGFRCR SERPLBLD CKD-EPI 2021: 58 ML/MIN/1.73M2
EOSINOPHIL # BLD AUTO: 0.4 10E3/UL (ref 0–0.7)
EOSINOPHIL NFR BLD AUTO: 9 %
ERYTHROCYTE [DISTWIDTH] IN BLOOD BY AUTOMATED COUNT: 12.9 % (ref 10–15)
HCT VFR BLD AUTO: 32.2 % (ref 40–53)
HGB BLD-MCNC: 11 G/DL (ref 13.3–17.7)
IMM GRANULOCYTES # BLD: 0 10E3/UL
IMM GRANULOCYTES NFR BLD: 0 %
LYMPHOCYTES # BLD AUTO: 1.2 10E3/UL (ref 0.8–5.3)
LYMPHOCYTES NFR BLD AUTO: 28 %
MCH RBC QN AUTO: 31.2 PG (ref 26.5–33)
MCHC RBC AUTO-ENTMCNC: 34.2 G/DL (ref 31.5–36.5)
MCV RBC AUTO: 91 FL (ref 78–100)
MONOCYTES # BLD AUTO: 0.4 10E3/UL (ref 0–1.3)
MONOCYTES NFR BLD AUTO: 9 %
NEUTROPHILS # BLD AUTO: 2.2 10E3/UL (ref 1.6–8.3)
NEUTROPHILS NFR BLD AUTO: 53 %
NRBC # BLD AUTO: 0 10E3/UL
NRBC BLD AUTO-RTO: 0 /100
PLATELET # BLD AUTO: 160 10E3/UL (ref 150–450)
RBC # BLD AUTO: 3.53 10E6/UL (ref 4.4–5.9)
WBC # BLD AUTO: 4.2 10E3/UL (ref 4–11)

## 2025-02-19 PROCEDURE — 82565 ASSAY OF CREATININE: CPT | Mod: ZL

## 2025-02-19 PROCEDURE — 85025 COMPLETE CBC W/AUTO DIFF WBC: CPT | Mod: ZL

## 2025-02-19 PROCEDURE — 86140 C-REACTIVE PROTEIN: CPT | Mod: ZL

## 2025-02-19 PROCEDURE — 36415 COLL VENOUS BLD VENIPUNCTURE: CPT | Mod: ZL

## 2025-02-26 ENCOUNTER — OFFICE VISIT (OUTPATIENT)
Dept: UROLOGY | Facility: OTHER | Age: 65
End: 2025-02-26
Attending: UROLOGY
Payer: MEDICARE

## 2025-02-26 ENCOUNTER — THERAPY VISIT (OUTPATIENT)
Dept: PHYSICAL THERAPY | Facility: OTHER | Age: 65
End: 2025-02-26
Payer: COMMERCIAL

## 2025-02-26 ENCOUNTER — LAB (OUTPATIENT)
Dept: LAB | Facility: OTHER | Age: 65
End: 2025-02-26
Payer: MEDICARE

## 2025-02-26 VITALS
WEIGHT: 201.1 LBS | HEART RATE: 99 BPM | SYSTOLIC BLOOD PRESSURE: 128 MMHG | DIASTOLIC BLOOD PRESSURE: 80 MMHG | OXYGEN SATURATION: 98 % | BODY MASS INDEX: 27.24 KG/M2 | RESPIRATION RATE: 16 BRPM | HEIGHT: 72 IN | TEMPERATURE: 97.3 F

## 2025-02-26 DIAGNOSIS — R97.20 ELEVATED PROSTATE SPECIFIC ANTIGEN (PSA): ICD-10-CM

## 2025-02-26 DIAGNOSIS — N40.0 BPH WITHOUT OBSTRUCTION/LOWER URINARY TRACT SYMPTOMS: Primary | ICD-10-CM

## 2025-02-26 DIAGNOSIS — T84.50XA INFECTION OF PROSTHETIC JOINT, INITIAL ENCOUNTER: Primary | ICD-10-CM

## 2025-02-26 DIAGNOSIS — D07.5 PIN III (PROSTATIC INTRAEPITHELIAL NEOPLASIA III): ICD-10-CM

## 2025-02-26 DIAGNOSIS — N52.01 ERECTILE DYSFUNCTION DUE TO ARTERIAL INSUFFICIENCY: ICD-10-CM

## 2025-02-26 LAB
ALBUMIN UR-MCNC: NEGATIVE MG/DL
APPEARANCE UR: CLEAR
BASOPHILS # BLD AUTO: 0 10E3/UL (ref 0–0.2)
BASOPHILS NFR BLD AUTO: 0 %
BILIRUB UR QL STRIP: NEGATIVE
COLOR UR AUTO: NORMAL
CREAT SERPL-MCNC: 1.31 MG/DL (ref 0.67–1.17)
CRP SERPL-MCNC: <3 MG/L
EGFRCR SERPLBLD CKD-EPI 2021: 60 ML/MIN/1.73M2
EOSINOPHIL # BLD AUTO: 0.2 10E3/UL (ref 0–0.7)
EOSINOPHIL NFR BLD AUTO: 4 %
ERYTHROCYTE [DISTWIDTH] IN BLOOD BY AUTOMATED COUNT: 14 % (ref 10–15)
GLUCOSE UR STRIP-MCNC: NEGATIVE MG/DL
HCT VFR BLD AUTO: 29.8 % (ref 40–53)
HGB BLD-MCNC: 10.2 G/DL (ref 13.3–17.7)
HGB UR QL STRIP: NEGATIVE
IMM GRANULOCYTES # BLD: 0 10E3/UL
IMM GRANULOCYTES NFR BLD: 0 %
KETONES UR STRIP-MCNC: NEGATIVE MG/DL
LEUKOCYTE ESTERASE UR QL STRIP: NEGATIVE
LYMPHOCYTES # BLD AUTO: 1.3 10E3/UL (ref 0.8–5.3)
LYMPHOCYTES NFR BLD AUTO: 24 %
MCH RBC QN AUTO: 31.4 PG (ref 26.5–33)
MCHC RBC AUTO-ENTMCNC: 34.2 G/DL (ref 31.5–36.5)
MCV RBC AUTO: 92 FL (ref 78–100)
MONOCYTES # BLD AUTO: 0.4 10E3/UL (ref 0–1.3)
MONOCYTES NFR BLD AUTO: 7 %
NEUTROPHILS # BLD AUTO: 3.4 10E3/UL (ref 1.6–8.3)
NEUTROPHILS NFR BLD AUTO: 64 %
NITRATE UR QL: NEGATIVE
NRBC # BLD AUTO: 0 10E3/UL
NRBC BLD AUTO-RTO: 0 /100
PH UR STRIP: 5 [PH] (ref 5–9)
PLATELET # BLD AUTO: 162 10E3/UL (ref 150–450)
RBC # BLD AUTO: 3.25 10E6/UL (ref 4.4–5.9)
SP GR UR STRIP: 1.02 (ref 1–1.03)
UROBILINOGEN UR STRIP-MCNC: NORMAL MG/DL
WBC # BLD AUTO: 5.4 10E3/UL (ref 4–11)

## 2025-02-26 PROCEDURE — 81003 URINALYSIS AUTO W/O SCOPE: CPT | Mod: ZL

## 2025-02-26 PROCEDURE — 82565 ASSAY OF CREATININE: CPT | Mod: ZL

## 2025-02-26 PROCEDURE — G0463 HOSPITAL OUTPT CLINIC VISIT: HCPCS | Mod: 25

## 2025-02-26 PROCEDURE — 85025 COMPLETE CBC W/AUTO DIFF WBC: CPT | Mod: ZL

## 2025-02-26 PROCEDURE — 36415 COLL VENOUS BLD VENIPUNCTURE: CPT | Mod: ZL

## 2025-02-26 PROCEDURE — 86140 C-REACTIVE PROTEIN: CPT | Mod: ZL

## 2025-02-26 NOTE — NURSING NOTE
Chief Complaint   Patient presents with    Follow Up     Elevated PSA   PVR=78  AUA13    Initial /80   Pulse 99   Temp 97.3  F (36.3  C)   Resp 16   Ht 1.829 m (6')   Wt 91.2 kg (201 lb 1.6 oz)   SpO2 98%   BMI 27.27 kg/m   Estimated body mass index is 27.27 kg/m  as calculated from the following:    Height as of this encounter: 1.829 m (6').    Weight as of this encounter: 91.2 kg (201 lb 1.6 oz).  Medication Reconciliation: complete    Meche Mesa RN

## 2025-02-26 NOTE — PROGRESS NOTES
Chief Complaint: No chief complaint on file.  HG PIN, Elevated PSA, BPH/Luts    HPI: Mr. 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    MRI at that time had demonstrated a PI-RADS 4 lesion left anterior base transition zone and his prostate measured 85 cm .     He developed 2 urinary tract infections each of which were treated with Bactrim after my initial visit.  He was started on tamsulosin for his prostate and urinary retention at that time.  He remains on this.     Patient has a history of methotrexate use for rheumatoid arthritis which does suppress his immune system to some degree.     It was recommended that he undergo repeat PSA testing 1 year from that date with a repeat MRI biopsy if the PSA rises.     Here today for continued follow-up.  Recent PSA improved to 8.28 one week ago.    Still with nocturia and a weak stream.  Voiding symptoms are relatively unchanged.  No hematuria.  No infections.  Does complain of GREER.  Libido is slightly diminished.  He has never had a testosterone.    The Cialis and sildenafil never did work.  Remains on tamsulosin.       Past Medical History:   Diagnosis Date    Equivocal stress test     Cardiolyte    Essential (primary) hypertension     No Comments Provided    Gastro-esophageal reflux disease without esophagitis     No Comments Provided    Hyperlipidemia     No Comments Provided    Male erectile dysfunction     7/2/2014    Rheumatoid arthritis (H)     No Comments Provided    Tubular adenoma        Past Surgical History:   Procedure Laterality Date    ARTHROSCOPY KNEE Left     No Comments Provided    ARTHROSCOPY SHOULDER Left     08/2011    ARTHROTOMY WRIST Left     Wrist surgery with fusion.    COLONOSCOPY  05/01/2009 5/01/2009,Normal screening colonoscopy, follow  up recommended in 10 years    COLONOSCOPY N/A 7/26/2019    tubular adenoma, 5 year follow up    ELBOW SURGERY Right     Right elbow surgery for bone spurs/chips    ESOPHAGOSCOPY, GASTROSCOPY, DUODENOSCOPY (EGD), COMBINED      12/2015,[place]    VASECTOMY      No Comments Provided       Current Outpatient Medications   Medication Sig Dispense Refill    aspirin (ASA) 81 MG tablet Take 1 tablet (81 mg) by mouth daily      folic acid (FOLVITE) 1 MG tablet Take 1 tablet (1,000 mcg) by mouth daily (Patient not taking: Reported on 1/27/2025) 90 tablet 3    lisinopril-hydrochlorothiazide (ZESTORETIC) 20-25 MG tablet Take 1 tablet by mouth daily 90 tablet 3    meloxicam (MOBIC) 7.5 MG tablet Take 1 tablet (7.5 mg) by mouth daily 90 tablet 4    methotrexate 2.5 MG tablet Take 6 tablets (15 mg) by mouth once a week (Patient not taking: Reported on 1/27/2025) 72 tablet 4    omeprazole (PRILOSEC) 20 MG DR capsule Take 1 capsule (20 mg) by mouth daily 90 capsule 4    pantoprazole (PROTONIX) 40 MG EC tablet Take 1 tablet (40 mg) by mouth 2 times daily. 60 tablet 1    sucralfate (CARAFATE) 1 GM/10ML suspension Take 10 mLs (1 g) by mouth 4 times daily. - before meals and at betime 473 mL 1    tadalafil (CIALIS) 5 MG tablet Take 5 mg by mouth TAKE 1-4 PILLS BY MOUTH ONE HOUR PRIOR TO DESIRED EFFECT AS NEEDED. DO NOT EXCEED 4 PILLS WITHIN A 24 HOUR PERIOD (Patient not taking: Reported on 1/27/2025)      tamsulosin (FLOMAX) 0.4 MG capsule TAKE 1 CAPSULE BY MOUTH ONCE DAILY AFTER SUPPER PRIOR  TO  BEDTIME.  WATCH  FOR  DIZZINESS  UPON  STANDING 90 capsule 4    traZODone (DESYREL) 50 MG tablet Take 1 tablet (50 mg) by mouth at bedtime. 30 tablet 0       ALLERGIES: Patient has no known allergies.     GENERAL PHYSICAL EXAM:   Vitals: There were no vitals taken for this visit.  There is no height or weight on file to calculate BMI.    GENERAL: Well groomed, well developed, well nourished male in NAD.  ENT:  ENT exam normal  CV:  Warm  extremities   RESPIRATORY: Normal respiratory effort.   GI:  Soft, NT, ND  MS: Moving all four  NEURO: Alert and oriented x 3.  PSYCH: Normal mood and affect, pleasant and agreeable during interview and exam.    :  Prostate greater than 80 g, smooth benign feeling    PVR: Residual urine by ultrasound was 78 ml.           RADIOLOGY: The following tests were reviewed: None    LABS: The last test results for Mr. Wicho Hawk were reviewed:  Results for orders placed or performed in visit on 02/26/25 (from the past 24 hours)   CBC with Platelets & Differential    Narrative    The following orders were created for panel order CBC with Platelets & Differential.  Procedure                               Abnormality         Status                     ---------                               -----------         ------                     CBC with platelets and d...[[id number]]                                                   Please view results for these tests on the individual orders.       PSA -   Lab Results   Component Value Date    PSA 8.28 02/12/2025    PSA 14.30 05/22/2024    PSA 9.08 03/05/2024    PSA 7.32 11/03/2023    PSA 5.26 10/06/2022    PSA 4.84 09/27/2021     BMP -   Recent Labs   Lab Test 02/19/25  0957 02/12/25  1320 02/04/25  1322 01/30/25  1511 01/23/25  1501 01/16/25  1522 12/17/24  1118 07/23/24  1400 05/22/24  0935   NA  --   --   --   --   --   --  135 131* 133*   POTASSIUM  --   --   --   --   --   --  4.1 3.9 4.4   CHLORIDE  --   --   --   --   --   --  98 95* 96*   CO2  --   --   --   --   --   --  31* 27 26   BUN  --   --  15.3 17.5 12.2   < > 8.4 12.2 18.0   CR 1.35* 1.46* 1.27* 1.49* 1.27*   < > 1.06 1.09 1.30*   GLC  --   --   --   --   --   --  118* 127* 101*   ARIEL  --   --   --   --   --   --  9.1 9.3 9.6    < > = values in this interval not displayed.       CBC -   Recent Labs   Lab Test 02/19/25  0957 02/12/25  1320 02/04/25  1321   WBC 4.2 5.7 7.8   HGB 11.0* 11.3* 10.9*    304  340       ASSESSMENT:   Elevated PSA status post negative MRI fusion biopsy  High-grade PIN  BPH with retention  ED with diminished libido    PLAN:   Discussion with the patient about his PSA and previous PIN.  Given that his PSA is improved we will continue to follow him at 6-month intervals.  Plan for PSA in 6 months.    Regarding his lower outlet bladder symptoms, he will continue the tamsulosin.  We discussed the use of finasteride with its risks as well as surgical cysts intervention such as HoLEP.  He would like to wait.    Regarding his ED and the lower libido we discussed testosterone testing.  I would recommend he try Cialis 20 mg on an empty stomach in the morning.  We discussed IPP and Caverject or Trimix but he is not interested in those.    All questions were addressed.    27 minutes spent on the date of this encounter doing chart review, history and exam, documentation and further activities as noted above.      Alexander Wong MD  [facility]

## 2025-02-28 ENCOUNTER — THERAPY VISIT (OUTPATIENT)
Dept: PHYSICAL THERAPY | Facility: OTHER | Age: 65
End: 2025-02-28
Payer: MEDICARE

## 2025-02-28 DIAGNOSIS — T84.50XA INFECTION OF PROSTHETIC JOINT, INITIAL ENCOUNTER: Primary | ICD-10-CM

## 2025-03-04 ENCOUNTER — THERAPY VISIT (OUTPATIENT)
Dept: PHYSICAL THERAPY | Facility: OTHER | Age: 65
End: 2025-03-04
Payer: COMMERCIAL

## 2025-03-04 DIAGNOSIS — T84.50XA INFECTION OF PROSTHETIC JOINT, INITIAL ENCOUNTER: Primary | ICD-10-CM

## 2025-03-05 ENCOUNTER — LAB (OUTPATIENT)
Dept: LAB | Facility: OTHER | Age: 65
End: 2025-03-05
Attending: UROLOGY
Payer: MEDICARE

## 2025-03-05 DIAGNOSIS — D07.5 PIN III (PROSTATIC INTRAEPITHELIAL NEOPLASIA III): ICD-10-CM

## 2025-03-05 DIAGNOSIS — N52.01 ERECTILE DYSFUNCTION DUE TO ARTERIAL INSUFFICIENCY: ICD-10-CM

## 2025-03-05 LAB
BASOPHILS # BLD AUTO: 0 10E3/UL (ref 0–0.2)
BASOPHILS NFR BLD AUTO: 1 %
CREAT SERPL-MCNC: 1.35 MG/DL (ref 0.67–1.17)
CRP SERPL-MCNC: <3 MG/L
EGFRCR SERPLBLD CKD-EPI 2021: 58 ML/MIN/1.73M2
EOSINOPHIL # BLD AUTO: 0.3 10E3/UL (ref 0–0.7)
EOSINOPHIL NFR BLD AUTO: 7 %
ERYTHROCYTE [DISTWIDTH] IN BLOOD BY AUTOMATED COUNT: 15.5 % (ref 10–15)
HCT VFR BLD AUTO: 26.5 % (ref 40–53)
HGB BLD-MCNC: 9.6 G/DL (ref 13.3–17.7)
IMM GRANULOCYTES # BLD: 0 10E3/UL
IMM GRANULOCYTES NFR BLD: 1 %
LYMPHOCYTES # BLD AUTO: 1.1 10E3/UL (ref 0.8–5.3)
LYMPHOCYTES NFR BLD AUTO: 29 %
MCH RBC QN AUTO: 34.2 PG (ref 26.5–33)
MCHC RBC AUTO-ENTMCNC: 36.2 G/DL (ref 31.5–36.5)
MCV RBC AUTO: 94 FL (ref 78–100)
MONOCYTES # BLD AUTO: 0.4 10E3/UL (ref 0–1.3)
MONOCYTES NFR BLD AUTO: 10 %
NEUTROPHILS # BLD AUTO: 2 10E3/UL (ref 1.6–8.3)
NEUTROPHILS NFR BLD AUTO: 52 %
NRBC # BLD AUTO: 0 10E3/UL
NRBC BLD AUTO-RTO: 0 /100
PLATELET # BLD AUTO: 149 10E3/UL (ref 150–450)
RBC # BLD AUTO: 2.81 10E6/UL (ref 4.4–5.9)
SHBG SERPL-SCNC: 57 NMOL/L (ref 11–80)
WBC # BLD AUTO: 3.9 10E3/UL (ref 4–11)

## 2025-03-05 PROCEDURE — 84270 ASSAY OF SEX HORMONE GLOBUL: CPT | Mod: ZL

## 2025-03-05 PROCEDURE — 82565 ASSAY OF CREATININE: CPT | Mod: ZL

## 2025-03-05 PROCEDURE — 36415 COLL VENOUS BLD VENIPUNCTURE: CPT | Mod: ZL

## 2025-03-05 PROCEDURE — 84403 ASSAY OF TOTAL TESTOSTERONE: CPT | Mod: ZL

## 2025-03-05 PROCEDURE — 85025 COMPLETE CBC W/AUTO DIFF WBC: CPT | Mod: ZL

## 2025-03-05 PROCEDURE — 86140 C-REACTIVE PROTEIN: CPT | Mod: ZL

## 2025-03-06 ENCOUNTER — THERAPY VISIT (OUTPATIENT)
Dept: PHYSICAL THERAPY | Facility: OTHER | Age: 65
End: 2025-03-06
Payer: MEDICARE

## 2025-03-06 DIAGNOSIS — T84.50XA INFECTION OF PROSTHETIC JOINT, INITIAL ENCOUNTER: Primary | ICD-10-CM

## 2025-03-06 LAB
TESTOST FREE SERPL-MCNC: 5.48 NG/DL
TESTOST SERPL-MCNC: 391 NG/DL (ref 240–950)

## 2025-03-06 NOTE — PROGRESS NOTES
PLAN  Continue therapy per current plan of care.    Beginning/End Dates of Progress Note Reporting Period:  1/28/25 to 03/06/2025    Referring Provider:  Provider Not In System     03/06/25 0500   Appointment Info   Signing clinician's name / credentials Rios Hernandez DPT   Total/Authorized Visits 10   Medical Diagnosis Infection of prosthetic joint, initial encounter (T84.50XA)   PT Tx Diagnosis Decreased range of motion decreased strength status post revision total knee   Progress Note/Certification   Start of Care Date 01/28/25   Onset of illness/injury or Date of Surgery 01/08/25   Therapy Frequency 1 to 2 times a week   Predicted Duration 16 weeks   Certification date from 01/28/25   Certification date to 04/22/25   Progress Note Completed Date 03/06/25   GOALS   PT Goals 2;3   PT Goal 1   Goal Identifier Range of motion   Goal Description Patient will have improved range of motion of 0 to 120 degrees to better tolerate all activities of daily living   Goal Progress Range of motion doing well   Target Date 03/11/25   PT Goal 2   Goal Identifier Strength   Goal Description Quad strength will improve to 5 out of 5 to tolerate running to return to recreational activities   Goal Progress Quad strengthening still limiting especially with terminal knee extension quad control   Target Date 05/20/25   PT Goal 3   Goal Identifier Stairs   Goal Description Patient will have reciprocal gait pattern on stairs   Goal Progress Improving stairs but still difficulty send   Target Date 03/25/25   Subjective Report   Subjective Report was fairly sore after last session   Objective Measures   Objective Measures Objective Measure 1   Objective Measure 1   Objective Measure Range of motion   Details NT   PT Modalities   PT Modalities Cryotherapy   Cryotherapy   Cryotherapy Minutes (27409) 10   Treatment Detail nice ice 10 min.   Treatment Interventions (PT)   Interventions Therapeutic Procedure/Exercise;Manual Therapy    Therapeutic Procedure/Exercise   Therapeutic Procedures: strength, endurance, ROM, flexibility minutes (91231) 45   Ther Proc 1 - Details bike 4 min, BOSU wt shifting, ball toss at mini tramp with black tband TKE hold. leg press 100# TKE control, stool scoots with resistance single-leg quads bilateral leg for hamstring, MedX leg press unilateral 100 pounds with control for terminal knee extension and control return, bilateral hamstring curl MedX 100/110 pounds x 15 each, standing on foam pad ball toss at mini tramp with Thera-Band resisted terminal knee extension control, backwards walking on treadmill 4 minutes, bike 5 minutes with random  resistance changes   Skilled Intervention Indicated to improve range of motion and strength   Patient Response/Progress muchi mproved tolerance to activitiites.   Plan   Home program Quad set, heel slides, heel prop, standing heel and toe raises, hip abduction, hip extension, mini squats, Thera-Band resisted terminal knee extension   Plan for next session treadmil, TKE control   Total Session Time   Timed Code Treatment Minutes 45   Total Treatment Time (sum of timed and untimed services) 55

## 2025-03-11 ENCOUNTER — THERAPY VISIT (OUTPATIENT)
Dept: PHYSICAL THERAPY | Facility: OTHER | Age: 65
End: 2025-03-11
Payer: COMMERCIAL

## 2025-03-11 DIAGNOSIS — T84.50XA INFECTION OF PROSTHETIC JOINT, INITIAL ENCOUNTER: Primary | ICD-10-CM

## 2025-03-12 ENCOUNTER — LAB (OUTPATIENT)
Dept: LAB | Facility: OTHER | Age: 65
End: 2025-03-12
Payer: MEDICARE

## 2025-03-12 DIAGNOSIS — D07.5 PIN III (PROSTATIC INTRAEPITHELIAL NEOPLASIA III): ICD-10-CM

## 2025-03-12 LAB
BASOPHILS # BLD AUTO: 0 10E3/UL (ref 0–0.2)
BASOPHILS NFR BLD AUTO: 1 %
CREAT SERPL-MCNC: 1.3 MG/DL (ref 0.67–1.17)
CRP SERPL-MCNC: <3 MG/L
EGFRCR SERPLBLD CKD-EPI 2021: 61 ML/MIN/1.73M2
EOSINOPHIL # BLD AUTO: 0.4 10E3/UL (ref 0–0.7)
EOSINOPHIL NFR BLD AUTO: 6 %
ERYTHROCYTE [DISTWIDTH] IN BLOOD BY AUTOMATED COUNT: 16.2 % (ref 10–15)
HCT VFR BLD AUTO: 30 % (ref 40–53)
HGB BLD-MCNC: 10.3 G/DL (ref 13.3–17.7)
IMM GRANULOCYTES # BLD: 0.1 10E3/UL
IMM GRANULOCYTES NFR BLD: 1 %
LYMPHOCYTES # BLD AUTO: 1.7 10E3/UL (ref 0.8–5.3)
LYMPHOCYTES NFR BLD AUTO: 27 %
MCH RBC QN AUTO: 31.4 PG (ref 26.5–33)
MCHC RBC AUTO-ENTMCNC: 34.3 G/DL (ref 31.5–36.5)
MCV RBC AUTO: 92 FL (ref 78–100)
MONOCYTES # BLD AUTO: 0.6 10E3/UL (ref 0–1.3)
MONOCYTES NFR BLD AUTO: 9 %
NEUTROPHILS # BLD AUTO: 3.5 10E3/UL (ref 1.6–8.3)
NEUTROPHILS NFR BLD AUTO: 57 %
NRBC # BLD AUTO: 0 10E3/UL
NRBC BLD AUTO-RTO: 0 /100
PLATELET # BLD AUTO: 220 10E3/UL (ref 150–450)
RBC # BLD AUTO: 3.28 10E6/UL (ref 4.4–5.9)
WBC # BLD AUTO: 6.2 10E3/UL (ref 4–11)

## 2025-03-12 PROCEDURE — 85025 COMPLETE CBC W/AUTO DIFF WBC: CPT | Mod: ZL

## 2025-03-12 PROCEDURE — 36415 COLL VENOUS BLD VENIPUNCTURE: CPT | Mod: ZL

## 2025-03-12 PROCEDURE — 82565 ASSAY OF CREATININE: CPT | Mod: ZL

## 2025-03-12 PROCEDURE — 86140 C-REACTIVE PROTEIN: CPT | Mod: ZL

## 2025-03-13 ENCOUNTER — THERAPY VISIT (OUTPATIENT)
Dept: PHYSICAL THERAPY | Facility: OTHER | Age: 65
End: 2025-03-13
Payer: COMMERCIAL

## 2025-03-13 DIAGNOSIS — T84.50XA INFECTION OF PROSTHETIC JOINT, INITIAL ENCOUNTER: Primary | ICD-10-CM

## 2025-03-18 ENCOUNTER — THERAPY VISIT (OUTPATIENT)
Dept: PHYSICAL THERAPY | Facility: OTHER | Age: 65
End: 2025-03-18
Payer: MEDICARE

## 2025-03-18 DIAGNOSIS — T84.50XA INFECTION OF PROSTHETIC JOINT, INITIAL ENCOUNTER: Primary | ICD-10-CM

## 2025-03-19 ENCOUNTER — LAB (OUTPATIENT)
Dept: LAB | Facility: OTHER | Age: 65
End: 2025-03-19
Payer: MEDICARE

## 2025-03-19 DIAGNOSIS — D07.5 PIN III (PROSTATIC INTRAEPITHELIAL NEOPLASIA III): ICD-10-CM

## 2025-03-19 LAB
BASOPHILS # BLD AUTO: 0.1 10E3/UL (ref 0–0.2)
BASOPHILS NFR BLD AUTO: 1 %
CREAT SERPL-MCNC: 1.49 MG/DL (ref 0.67–1.17)
CRP SERPL-MCNC: <3 MG/L
EGFRCR SERPLBLD CKD-EPI 2021: 51 ML/MIN/1.73M2
EOSINOPHIL # BLD AUTO: 0.3 10E3/UL (ref 0–0.7)
EOSINOPHIL NFR BLD AUTO: 5 %
ERYTHROCYTE [DISTWIDTH] IN BLOOD BY AUTOMATED COUNT: 16 % (ref 10–15)
HCT VFR BLD AUTO: 33.1 % (ref 40–53)
HGB BLD-MCNC: 11.1 G/DL (ref 13.3–17.7)
IMM GRANULOCYTES # BLD: 0 10E3/UL
IMM GRANULOCYTES NFR BLD: 1 %
LYMPHOCYTES # BLD AUTO: 1.5 10E3/UL (ref 0.8–5.3)
LYMPHOCYTES NFR BLD AUTO: 24 %
MCH RBC QN AUTO: 31 PG (ref 26.5–33)
MCHC RBC AUTO-ENTMCNC: 33.5 G/DL (ref 31.5–36.5)
MCV RBC AUTO: 93 FL (ref 78–100)
MONOCYTES # BLD AUTO: 0.7 10E3/UL (ref 0–1.3)
MONOCYTES NFR BLD AUTO: 11 %
NEUTROPHILS # BLD AUTO: 3.7 10E3/UL (ref 1.6–8.3)
NEUTROPHILS NFR BLD AUTO: 59 %
NRBC # BLD AUTO: 0 10E3/UL
NRBC BLD AUTO-RTO: 0 /100
PLATELET # BLD AUTO: 240 10E3/UL (ref 150–450)
RBC # BLD AUTO: 3.58 10E6/UL (ref 4.4–5.9)
WBC # BLD AUTO: 6.2 10E3/UL (ref 4–11)

## 2025-03-19 PROCEDURE — 85025 COMPLETE CBC W/AUTO DIFF WBC: CPT | Mod: ZL

## 2025-03-19 PROCEDURE — 82565 ASSAY OF CREATININE: CPT | Mod: ZL

## 2025-03-19 PROCEDURE — 36415 COLL VENOUS BLD VENIPUNCTURE: CPT | Mod: ZL

## 2025-03-19 PROCEDURE — 86140 C-REACTIVE PROTEIN: CPT | Mod: ZL

## 2025-03-20 ENCOUNTER — THERAPY VISIT (OUTPATIENT)
Dept: PHYSICAL THERAPY | Facility: OTHER | Age: 65
End: 2025-03-20
Payer: COMMERCIAL

## 2025-03-20 DIAGNOSIS — T84.50XA INFECTION OF PROSTHETIC JOINT, INITIAL ENCOUNTER: Primary | ICD-10-CM

## 2025-03-25 ENCOUNTER — TELEPHONE (OUTPATIENT)
Dept: FAMILY MEDICINE | Facility: OTHER | Age: 65
End: 2025-03-25
Payer: COMMERCIAL

## 2025-03-25 ENCOUNTER — THERAPY VISIT (OUTPATIENT)
Dept: PHYSICAL THERAPY | Facility: OTHER | Age: 65
End: 2025-03-25
Payer: MEDICARE

## 2025-03-25 DIAGNOSIS — K21.9 GASTROESOPHAGEAL REFLUX DISEASE, UNSPECIFIED WHETHER ESOPHAGITIS PRESENT: Primary | ICD-10-CM

## 2025-03-25 DIAGNOSIS — T84.50XA INFECTION OF PROSTHETIC JOINT, INITIAL ENCOUNTER: Primary | ICD-10-CM

## 2025-03-25 RX ORDER — OMEPRAZOLE 20 MG/1
20-40 CAPSULE, DELAYED RELEASE ORAL DAILY PRN
Qty: 90 CAPSULE | Refills: 4 | Status: SHIPPED | OUTPATIENT
Start: 2025-03-25

## 2025-03-25 NOTE — TELEPHONE ENCOUNTER
"Received a fax from [facility] in regards to an Rx for Omeprazole 20 mg caps take one cap by mouth once daily    Note from pharmacy:  \"Patient is asking to be on this instead of the Pantoprazole\"    Lexi Nunez RN on 3/25/2025 at 4:19 PM    "

## 2025-03-26 ENCOUNTER — LAB (OUTPATIENT)
Dept: LAB | Facility: OTHER | Age: 65
End: 2025-03-26
Payer: MEDICARE

## 2025-03-26 DIAGNOSIS — D07.5 PIN III (PROSTATIC INTRAEPITHELIAL NEOPLASIA III): ICD-10-CM

## 2025-03-26 LAB
BASOPHILS # BLD AUTO: 0 10E3/UL (ref 0–0.2)
BASOPHILS NFR BLD AUTO: 1 %
CREAT SERPL-MCNC: 1.32 MG/DL (ref 0.67–1.17)
CRP SERPL-MCNC: <3 MG/L
EGFRCR SERPLBLD CKD-EPI 2021: 59 ML/MIN/1.73M2
EOSINOPHIL # BLD AUTO: 0.3 10E3/UL (ref 0–0.7)
EOSINOPHIL NFR BLD AUTO: 6 %
ERYTHROCYTE [DISTWIDTH] IN BLOOD BY AUTOMATED COUNT: 16.3 % (ref 10–15)
HCT VFR BLD AUTO: 33.8 % (ref 40–53)
HGB BLD-MCNC: 11.6 G/DL (ref 13.3–17.7)
IMM GRANULOCYTES # BLD: 0 10E3/UL
IMM GRANULOCYTES NFR BLD: 0 %
LYMPHOCYTES # BLD AUTO: 1.2 10E3/UL (ref 0.8–5.3)
LYMPHOCYTES NFR BLD AUTO: 23 %
MCH RBC QN AUTO: 32 PG (ref 26.5–33)
MCHC RBC AUTO-ENTMCNC: 34.3 G/DL (ref 31.5–36.5)
MCV RBC AUTO: 93 FL (ref 78–100)
MONOCYTES # BLD AUTO: 0.5 10E3/UL (ref 0–1.3)
MONOCYTES NFR BLD AUTO: 9 %
NEUTROPHILS # BLD AUTO: 3.2 10E3/UL (ref 1.6–8.3)
NEUTROPHILS NFR BLD AUTO: 61 %
NRBC # BLD AUTO: 0 10E3/UL
NRBC BLD AUTO-RTO: 0 /100
PLATELET # BLD AUTO: 186 10E3/UL (ref 150–450)
RBC # BLD AUTO: 3.63 10E6/UL (ref 4.4–5.9)
WBC # BLD AUTO: 5.3 10E3/UL (ref 4–11)

## 2025-03-26 PROCEDURE — 36415 COLL VENOUS BLD VENIPUNCTURE: CPT | Mod: ZL

## 2025-03-26 PROCEDURE — 86140 C-REACTIVE PROTEIN: CPT | Mod: ZL

## 2025-03-26 PROCEDURE — 85004 AUTOMATED DIFF WBC COUNT: CPT | Mod: ZL

## 2025-03-26 PROCEDURE — 82565 ASSAY OF CREATININE: CPT | Mod: ZL

## 2025-03-26 PROCEDURE — 85014 HEMATOCRIT: CPT | Mod: ZL

## 2025-03-31 ENCOUNTER — THERAPY VISIT (OUTPATIENT)
Dept: PHYSICAL THERAPY | Facility: OTHER | Age: 65
End: 2025-03-31
Payer: MEDICARE

## 2025-03-31 DIAGNOSIS — T84.50XA INFECTION OF PROSTHETIC JOINT, INITIAL ENCOUNTER: Primary | ICD-10-CM

## 2025-03-31 PROCEDURE — 97110 THERAPEUTIC EXERCISES: CPT | Mod: GP

## 2025-04-01 ENCOUNTER — ALLIED HEALTH/NURSE VISIT (OUTPATIENT)
Dept: FAMILY MEDICINE | Facility: OTHER | Age: 65
End: 2025-04-01
Payer: MEDICARE

## 2025-04-01 DIAGNOSIS — T84.50XA INFECTION OF PROSTHETIC JOINT, INITIAL ENCOUNTER: Primary | ICD-10-CM

## 2025-04-01 LAB
ATRIAL RATE - MUSE: 84 BPM
DIASTOLIC BLOOD PRESSURE - MUSE: NORMAL MMHG
INTERPRETATION ECG - MUSE: NORMAL
P AXIS - MUSE: 51 DEGREES
PR INTERVAL - MUSE: 158 MS
QRS DURATION - MUSE: 122 MS
QT - MUSE: 390 MS
QTC - MUSE: 460 MS
R AXIS - MUSE: 15 DEGREES
SYSTOLIC BLOOD PRESSURE - MUSE: NORMAL MMHG
T AXIS - MUSE: 17 DEGREES
VENTRICULAR RATE- MUSE: 84 BPM

## 2025-04-01 PROCEDURE — 93005 ELECTROCARDIOGRAM TRACING: CPT

## 2025-04-01 NOTE — PROGRESS NOTES
Pt presented to nurse injection room to get an EKG. EKG ordered by Dr. Marshal Amos due to Infection in total joint replacement. EKG performed and tracing faxed to ordering provider. EKG transmitted and imported to [employer], then tracing and written order sent for scanning into Epic.     Mel Interiano RN ....................  4/1/2025   9:57 AM

## 2025-04-02 ENCOUNTER — LAB (OUTPATIENT)
Dept: LAB | Facility: OTHER | Age: 65
End: 2025-04-02
Payer: MEDICARE

## 2025-04-02 ENCOUNTER — THERAPY VISIT (OUTPATIENT)
Dept: PHYSICAL THERAPY | Facility: OTHER | Age: 65
End: 2025-04-02
Payer: MEDICARE

## 2025-04-02 DIAGNOSIS — D07.5 PIN III (PROSTATIC INTRAEPITHELIAL NEOPLASIA III): ICD-10-CM

## 2025-04-02 DIAGNOSIS — T84.50XA INFECTION OF PROSTHETIC JOINT, INITIAL ENCOUNTER: Primary | ICD-10-CM

## 2025-04-02 LAB
BASOPHILS # BLD AUTO: 0 10E3/UL (ref 0–0.2)
BASOPHILS NFR BLD AUTO: 1 %
CREAT SERPL-MCNC: 1.39 MG/DL (ref 0.67–1.17)
CRP SERPL-MCNC: <3 MG/L
EGFRCR SERPLBLD CKD-EPI 2021: 56 ML/MIN/1.73M2
EOSINOPHIL # BLD AUTO: 0.4 10E3/UL (ref 0–0.7)
EOSINOPHIL NFR BLD AUTO: 7 %
ERYTHROCYTE [DISTWIDTH] IN BLOOD BY AUTOMATED COUNT: 15.8 % (ref 10–15)
HCT VFR BLD AUTO: 33.9 % (ref 40–53)
HGB BLD-MCNC: 11.6 G/DL (ref 13.3–17.7)
IMM GRANULOCYTES # BLD: 0 10E3/UL
IMM GRANULOCYTES NFR BLD: 0 %
LYMPHOCYTES # BLD AUTO: 1.2 10E3/UL (ref 0.8–5.3)
LYMPHOCYTES NFR BLD AUTO: 20 %
MCH RBC QN AUTO: 31.6 PG (ref 26.5–33)
MCHC RBC AUTO-ENTMCNC: 34.2 G/DL (ref 31.5–36.5)
MCV RBC AUTO: 92 FL (ref 78–100)
MONOCYTES # BLD AUTO: 0.6 10E3/UL (ref 0–1.3)
MONOCYTES NFR BLD AUTO: 10 %
NEUTROPHILS # BLD AUTO: 3.7 10E3/UL (ref 1.6–8.3)
NEUTROPHILS NFR BLD AUTO: 62 %
NRBC # BLD AUTO: 0 10E3/UL
NRBC BLD AUTO-RTO: 0 /100
PLATELET # BLD AUTO: 183 10E3/UL (ref 150–450)
RBC # BLD AUTO: 3.67 10E6/UL (ref 4.4–5.9)
WBC # BLD AUTO: 6.1 10E3/UL (ref 4–11)

## 2025-04-02 PROCEDURE — 36415 COLL VENOUS BLD VENIPUNCTURE: CPT | Mod: ZL

## 2025-04-02 PROCEDURE — 82565 ASSAY OF CREATININE: CPT | Mod: ZL

## 2025-04-02 PROCEDURE — 86140 C-REACTIVE PROTEIN: CPT | Mod: ZL

## 2025-04-02 PROCEDURE — 85025 COMPLETE CBC W/AUTO DIFF WBC: CPT | Mod: ZL

## 2025-04-08 ENCOUNTER — THERAPY VISIT (OUTPATIENT)
Dept: PHYSICAL THERAPY | Facility: OTHER | Age: 65
End: 2025-04-08
Payer: COMMERCIAL

## 2025-04-08 DIAGNOSIS — T84.50XA INFECTION OF PROSTHETIC JOINT, INITIAL ENCOUNTER: Primary | ICD-10-CM

## 2025-04-10 ENCOUNTER — THERAPY VISIT (OUTPATIENT)
Dept: PHYSICAL THERAPY | Facility: OTHER | Age: 65
End: 2025-04-10
Payer: MEDICARE

## 2025-04-10 DIAGNOSIS — T84.50XA INFECTION OF PROSTHETIC JOINT, INITIAL ENCOUNTER: Primary | ICD-10-CM

## 2025-04-10 NOTE — PROGRESS NOTES
PLAN  Continue therapy per current plan of care.    Beginning/End Dates of Progress Note Reporting Period:  3/6/25 to 04/10/2025    Referring Provider:  Екатерина Gibson     04/10/25 0500   Appointment Info   Signing clinician's name / credentials Rios Hernandez DPT   Total/Authorized Visits 20   Medical Diagnosis Infection of prosthetic joint, initial encounter (T84.50XA)   PT Tx Diagnosis Decreased range of motion decreased strength status post revision total knee   Progress Note/Certification   Start of Care Date 01/28/25   Onset of illness/injury or Date of Surgery 01/08/25   Therapy Frequency 1 to 2 times a week   Predicted Duration 16 weeks   Certification date from 01/28/25   Certification date to 04/22/25   Progress Note Completed Date 04/10/25   GOALS   PT Goals 2;3   PT Goal 1   Goal Identifier Range of motion   Goal Description Patient will have improved range of motion of 0 to 120 degrees to better tolerate all activities of daily living   Goal Progress still lacking extenstion/lock out contorl   Target Date 03/11/25   PT Goal 2   Goal Identifier Strength   Goal Description Quad strength will improve to 5 out of 5 to tolerate running to return to recreational activities   Goal Progress terminal knee ext control   Target Date 05/20/25   PT Goal 3   Goal Identifier Stairs   Goal Description Patient will have reciprocal gait pattern on stairs   Goal Progress better with stairs and ladder.   Target Date 03/25/25   Subjective Report   Subjective Report right knee is more sore today.   Objective Measures   Objective Measures Objective Measure 1   Objective Measure 1   Objective Measure Range of motion   Details NT   PT Modalities   PT Modalities Cryotherapy   Cryotherapy   Cryotherapy Minutes (03592) 10   Treatment Detail nice ice 10 min.   Treatment Interventions (PT)   Interventions Therapeutic Procedure/Exercise;Manual Therapy   Therapeutic Procedure/Exercise   Therapeutic Procedures: strength,  endurance, ROM, flexibility minutes (98871) 50   Ther Proc 1 - Details bike 5 min, TRX hops, medX ham 70/60# unilat, 1/2 lunge med ball toss 2x10, medX lumbar ext 100/120# x15 each, TRX row single leg stance, 2 leg hops for speed. bike 10 min random control .   Ther Proc 2 forward step downs-working on eccentric control on 6in and 4in, lateral step down focus on eccentric control on 6in   Skilled Intervention Indicated to improve range of motion and strength   Patient Response/Progress right knee pain.   Plan   Home program Quad set, heel slides, heel prop, standing heel and toe raises, hip abduction, hip extension, mini squats, Thera-Band resisted terminal knee extension   Plan for next session protect right knee, consider injection.   Comments   Comments not ready to run, needs more control/power.   Total Session Time   Timed Code Treatment Minutes 50   Total Treatment Time (sum of timed and untimed services) 60

## 2025-04-15 ENCOUNTER — THERAPY VISIT (OUTPATIENT)
Dept: PHYSICAL THERAPY | Facility: OTHER | Age: 65
End: 2025-04-15
Payer: MEDICARE

## 2025-04-15 DIAGNOSIS — T84.50XA INFECTION OF PROSTHETIC JOINT, INITIAL ENCOUNTER: Primary | ICD-10-CM

## 2025-04-17 ENCOUNTER — THERAPY VISIT (OUTPATIENT)
Dept: PHYSICAL THERAPY | Facility: OTHER | Age: 65
End: 2025-04-17
Payer: COMMERCIAL

## 2025-04-17 ENCOUNTER — LAB (OUTPATIENT)
Dept: LAB | Facility: OTHER | Age: 65
End: 2025-04-17
Payer: MEDICARE

## 2025-04-17 DIAGNOSIS — T84.50XA INFECTION OF PROSTHETIC JOINT, INITIAL ENCOUNTER: Primary | ICD-10-CM

## 2025-04-17 DIAGNOSIS — D07.5 PIN III (PROSTATIC INTRAEPITHELIAL NEOPLASIA III): ICD-10-CM

## 2025-04-17 LAB
BASOPHILS # BLD AUTO: 0 10E3/UL (ref 0–0.2)
BASOPHILS NFR BLD AUTO: 0 %
CREAT SERPL-MCNC: 1.21 MG/DL (ref 0.67–1.17)
CRP SERPL-MCNC: <3 MG/L
EGFRCR SERPLBLD CKD-EPI 2021: 66 ML/MIN/1.73M2
EOSINOPHIL # BLD AUTO: 0.2 10E3/UL (ref 0–0.7)
EOSINOPHIL NFR BLD AUTO: 4 %
ERYTHROCYTE [DISTWIDTH] IN BLOOD BY AUTOMATED COUNT: 15.3 % (ref 10–15)
HCT VFR BLD AUTO: 35.8 % (ref 40–53)
HGB BLD-MCNC: 12.2 G/DL (ref 13.3–17.7)
IMM GRANULOCYTES # BLD: 0 10E3/UL
IMM GRANULOCYTES NFR BLD: 0 %
LYMPHOCYTES # BLD AUTO: 1.5 10E3/UL (ref 0.8–5.3)
LYMPHOCYTES NFR BLD AUTO: 21 %
MCH RBC QN AUTO: 31 PG (ref 26.5–33)
MCHC RBC AUTO-ENTMCNC: 34.1 G/DL (ref 31.5–36.5)
MCV RBC AUTO: 91 FL (ref 78–100)
MONOCYTES # BLD AUTO: 0.6 10E3/UL (ref 0–1.3)
MONOCYTES NFR BLD AUTO: 9 %
NEUTROPHILS # BLD AUTO: 4.4 10E3/UL (ref 1.6–8.3)
NEUTROPHILS NFR BLD AUTO: 65 %
NRBC # BLD AUTO: 0 10E3/UL
NRBC BLD AUTO-RTO: 0 /100
PLATELET # BLD AUTO: 241 10E3/UL (ref 150–450)
RBC # BLD AUTO: 3.93 10E6/UL (ref 4.4–5.9)
WBC # BLD AUTO: 6.8 10E3/UL (ref 4–11)

## 2025-04-17 PROCEDURE — 82565 ASSAY OF CREATININE: CPT | Mod: ZL

## 2025-04-17 PROCEDURE — 36415 COLL VENOUS BLD VENIPUNCTURE: CPT | Mod: ZL

## 2025-04-17 PROCEDURE — 85004 AUTOMATED DIFF WBC COUNT: CPT | Mod: ZL

## 2025-04-17 PROCEDURE — 86140 C-REACTIVE PROTEIN: CPT | Mod: ZL

## 2025-04-21 ENCOUNTER — THERAPY VISIT (OUTPATIENT)
Dept: PHYSICAL THERAPY | Facility: OTHER | Age: 65
End: 2025-04-21
Payer: MEDICARE

## 2025-04-21 DIAGNOSIS — T84.50XA INFECTION OF PROSTHETIC JOINT, INITIAL ENCOUNTER: Primary | ICD-10-CM

## 2025-04-24 ENCOUNTER — THERAPY VISIT (OUTPATIENT)
Dept: PHYSICAL THERAPY | Facility: OTHER | Age: 65
End: 2025-04-24
Payer: COMMERCIAL

## 2025-04-24 DIAGNOSIS — T84.50XA INFECTION OF PROSTHETIC JOINT, INITIAL ENCOUNTER: Primary | ICD-10-CM

## 2025-04-29 ENCOUNTER — LAB (OUTPATIENT)
Dept: LAB | Facility: OTHER | Age: 65
End: 2025-04-29
Payer: MEDICARE

## 2025-04-29 ENCOUNTER — THERAPY VISIT (OUTPATIENT)
Dept: PHYSICAL THERAPY | Facility: OTHER | Age: 65
End: 2025-04-29
Payer: MEDICARE

## 2025-04-29 DIAGNOSIS — D07.5 PIN III (PROSTATIC INTRAEPITHELIAL NEOPLASIA III): ICD-10-CM

## 2025-04-29 DIAGNOSIS — T84.50XA INFECTION OF PROSTHETIC JOINT, INITIAL ENCOUNTER: Primary | ICD-10-CM

## 2025-04-29 LAB
BASOPHILS # BLD AUTO: 0.1 10E3/UL (ref 0–0.2)
BASOPHILS NFR BLD AUTO: 1 %
CREAT SERPL-MCNC: 1.21 MG/DL (ref 0.67–1.17)
CRP SERPL-MCNC: <3 MG/L
EGFRCR SERPLBLD CKD-EPI 2021: 66 ML/MIN/1.73M2
EOSINOPHIL # BLD AUTO: 0.2 10E3/UL (ref 0–0.7)
EOSINOPHIL NFR BLD AUTO: 2 %
ERYTHROCYTE [DISTWIDTH] IN BLOOD BY AUTOMATED COUNT: 14.6 % (ref 10–15)
HCT VFR BLD AUTO: 38.9 % (ref 40–53)
HGB BLD-MCNC: 13.2 G/DL (ref 13.3–17.7)
IMM GRANULOCYTES # BLD: 0 10E3/UL
IMM GRANULOCYTES NFR BLD: 0 %
LYMPHOCYTES # BLD AUTO: 1.8 10E3/UL (ref 0.8–5.3)
LYMPHOCYTES NFR BLD AUTO: 19 %
MCH RBC QN AUTO: 31.1 PG (ref 26.5–33)
MCHC RBC AUTO-ENTMCNC: 33.9 G/DL (ref 31.5–36.5)
MCV RBC AUTO: 92 FL (ref 78–100)
MONOCYTES # BLD AUTO: 0.7 10E3/UL (ref 0–1.3)
MONOCYTES NFR BLD AUTO: 7 %
NEUTROPHILS # BLD AUTO: 6.8 10E3/UL (ref 1.6–8.3)
NEUTROPHILS NFR BLD AUTO: 71 %
NRBC # BLD AUTO: 0 10E3/UL
NRBC BLD AUTO-RTO: 0 /100
PLATELET # BLD AUTO: 280 10E3/UL (ref 150–450)
RBC # BLD AUTO: 4.24 10E6/UL (ref 4.4–5.9)
WBC # BLD AUTO: 9.6 10E3/UL (ref 4–11)

## 2025-04-29 PROCEDURE — 85041 AUTOMATED RBC COUNT: CPT | Mod: ZL

## 2025-04-29 PROCEDURE — 86140 C-REACTIVE PROTEIN: CPT | Mod: ZL

## 2025-04-29 PROCEDURE — 85048 AUTOMATED LEUKOCYTE COUNT: CPT | Mod: ZL

## 2025-04-29 PROCEDURE — 82565 ASSAY OF CREATININE: CPT | Mod: ZL

## 2025-04-29 PROCEDURE — 36415 COLL VENOUS BLD VENIPUNCTURE: CPT | Mod: ZL

## 2025-04-29 PROCEDURE — 85004 AUTOMATED DIFF WBC COUNT: CPT | Mod: ZL

## 2025-05-07 ENCOUNTER — THERAPY VISIT (OUTPATIENT)
Dept: PHYSICAL THERAPY | Facility: OTHER | Age: 65
End: 2025-05-07
Attending: PHYSICIAN ASSISTANT
Payer: COMMERCIAL

## 2025-05-07 DIAGNOSIS — T84.50XA INFECTION OF PROSTHETIC JOINT, INITIAL ENCOUNTER: Primary | ICD-10-CM

## 2025-05-14 ENCOUNTER — THERAPY VISIT (OUTPATIENT)
Dept: PHYSICAL THERAPY | Facility: OTHER | Age: 65
End: 2025-05-14
Attending: PHYSICIAN ASSISTANT
Payer: MEDICARE

## 2025-05-14 ENCOUNTER — LAB (OUTPATIENT)
Dept: LAB | Facility: OTHER | Age: 65
End: 2025-05-14
Payer: MEDICARE

## 2025-05-14 DIAGNOSIS — T84.50XA INFECTION OF PROSTHETIC JOINT, INITIAL ENCOUNTER: Primary | ICD-10-CM

## 2025-05-14 DIAGNOSIS — D07.5 PIN III (PROSTATIC INTRAEPITHELIAL NEOPLASIA III): ICD-10-CM

## 2025-05-14 DIAGNOSIS — T84.50XA INFECTION OF PROSTHETIC JOINT, INITIAL ENCOUNTER: ICD-10-CM

## 2025-05-14 LAB
BASOPHILS # BLD AUTO: 0 10E3/UL (ref 0–0.2)
BASOPHILS NFR BLD AUTO: 1 %
CREAT SERPL-MCNC: 1.43 MG/DL (ref 0.67–1.17)
CRP SERPL-MCNC: <3 MG/L
EGFRCR SERPLBLD CKD-EPI 2021: 54 ML/MIN/1.73M2
EOSINOPHIL # BLD AUTO: 0.2 10E3/UL (ref 0–0.7)
EOSINOPHIL NFR BLD AUTO: 2 %
ERYTHROCYTE [DISTWIDTH] IN BLOOD BY AUTOMATED COUNT: 14.7 % (ref 10–15)
HCT VFR BLD AUTO: 38.3 % (ref 40–53)
HGB BLD-MCNC: 13.1 G/DL (ref 13.3–17.7)
IMM GRANULOCYTES # BLD: 0 10E3/UL
IMM GRANULOCYTES NFR BLD: 0 %
LYMPHOCYTES # BLD AUTO: 1.3 10E3/UL (ref 0.8–5.3)
LYMPHOCYTES NFR BLD AUTO: 19 %
MCH RBC QN AUTO: 30.8 PG (ref 26.5–33)
MCHC RBC AUTO-ENTMCNC: 34.2 G/DL (ref 31.5–36.5)
MCV RBC AUTO: 90 FL (ref 78–100)
MONOCYTES # BLD AUTO: 0.6 10E3/UL (ref 0–1.3)
MONOCYTES NFR BLD AUTO: 8 %
NEUTROPHILS # BLD AUTO: 4.6 10E3/UL (ref 1.6–8.3)
NEUTROPHILS NFR BLD AUTO: 69 %
NRBC # BLD AUTO: 0 10E3/UL
NRBC BLD AUTO-RTO: 0 /100
PLATELET # BLD AUTO: 238 10E3/UL (ref 150–450)
RBC # BLD AUTO: 4.25 10E6/UL (ref 4.4–5.9)
WBC # BLD AUTO: 6.7 10E3/UL (ref 4–11)

## 2025-05-14 PROCEDURE — 82565 ASSAY OF CREATININE: CPT | Mod: ZL

## 2025-05-14 PROCEDURE — 85004 AUTOMATED DIFF WBC COUNT: CPT | Mod: ZL

## 2025-05-14 PROCEDURE — 36415 COLL VENOUS BLD VENIPUNCTURE: CPT | Mod: ZL

## 2025-05-14 PROCEDURE — 86140 C-REACTIVE PROTEIN: CPT | Mod: ZL

## 2025-05-28 ENCOUNTER — LAB (OUTPATIENT)
Dept: LAB | Facility: OTHER | Age: 65
End: 2025-05-28
Payer: MEDICARE

## 2025-05-28 DIAGNOSIS — D07.5 PIN III (PROSTATIC INTRAEPITHELIAL NEOPLASIA III): ICD-10-CM

## 2025-05-28 LAB
BASOPHILS # BLD AUTO: 0 10E3/UL (ref 0–0.2)
BASOPHILS NFR BLD AUTO: 1 %
CREAT SERPL-MCNC: 1.45 MG/DL (ref 0.67–1.17)
CRP SERPL-MCNC: <3 MG/L
EGFRCR SERPLBLD CKD-EPI 2021: 53 ML/MIN/1.73M2
EOSINOPHIL # BLD AUTO: 0.1 10E3/UL (ref 0–0.7)
EOSINOPHIL NFR BLD AUTO: 2 %
ERYTHROCYTE [DISTWIDTH] IN BLOOD BY AUTOMATED COUNT: 14.6 % (ref 10–15)
HCT VFR BLD AUTO: 36.9 % (ref 40–53)
HGB BLD-MCNC: 12.6 G/DL (ref 13.3–17.7)
IMM GRANULOCYTES # BLD: 0 10E3/UL
IMM GRANULOCYTES NFR BLD: 0 %
LYMPHOCYTES # BLD AUTO: 1.4 10E3/UL (ref 0.8–5.3)
LYMPHOCYTES NFR BLD AUTO: 21 %
MCH RBC QN AUTO: 30.9 PG (ref 26.5–33)
MCHC RBC AUTO-ENTMCNC: 34.1 G/DL (ref 31.5–36.5)
MCV RBC AUTO: 90 FL (ref 78–100)
MONOCYTES # BLD AUTO: 0.6 10E3/UL (ref 0–1.3)
MONOCYTES NFR BLD AUTO: 9 %
NEUTROPHILS # BLD AUTO: 4.5 10E3/UL (ref 1.6–8.3)
NEUTROPHILS NFR BLD AUTO: 67 %
NRBC # BLD AUTO: 0 10E3/UL
NRBC BLD AUTO-RTO: 0 /100
PLATELET # BLD AUTO: 252 10E3/UL (ref 150–450)
RBC # BLD AUTO: 4.08 10E6/UL (ref 4.4–5.9)
WBC # BLD AUTO: 6.7 10E3/UL (ref 4–11)

## 2025-05-28 PROCEDURE — 86140 C-REACTIVE PROTEIN: CPT | Mod: ZL

## 2025-05-28 PROCEDURE — 36415 COLL VENOUS BLD VENIPUNCTURE: CPT | Mod: ZL

## 2025-05-28 PROCEDURE — 82565 ASSAY OF CREATININE: CPT | Mod: ZL

## 2025-05-28 PROCEDURE — 85041 AUTOMATED RBC COUNT: CPT | Mod: ZL

## 2025-06-06 DIAGNOSIS — M06.09 RHEUMATOID ARTHRITIS OF MULTIPLE SITES WITHOUT RHEUMATOID FACTOR (H): ICD-10-CM

## 2025-06-06 RX ORDER — FOLIC ACID 1 MG/1
1000 TABLET ORAL DAILY
Qty: 90 TABLET | Refills: 3 | Status: CANCELLED | OUTPATIENT
Start: 2025-06-06

## 2025-06-09 ENCOUNTER — OFFICE VISIT (OUTPATIENT)
Dept: FAMILY MEDICINE | Facility: OTHER | Age: 65
End: 2025-06-09
Attending: FAMILY MEDICINE
Payer: COMMERCIAL

## 2025-06-09 VITALS
SYSTOLIC BLOOD PRESSURE: 112 MMHG | HEIGHT: 72 IN | DIASTOLIC BLOOD PRESSURE: 74 MMHG | WEIGHT: 205.4 LBS | BODY MASS INDEX: 27.82 KG/M2 | HEART RATE: 78 BPM | TEMPERATURE: 97.1 F | RESPIRATION RATE: 16 BRPM | OXYGEN SATURATION: 99 %

## 2025-06-09 DIAGNOSIS — M06.09 RHEUMATOID ARTHRITIS OF MULTIPLE SITES WITHOUT RHEUMATOID FACTOR (H): ICD-10-CM

## 2025-06-09 DIAGNOSIS — Z12.11 COLON CANCER SCREENING: Primary | ICD-10-CM

## 2025-06-09 DIAGNOSIS — K21.9 GASTROESOPHAGEAL REFLUX DISEASE WITHOUT ESOPHAGITIS: ICD-10-CM

## 2025-06-09 DIAGNOSIS — I10 ESSENTIAL HYPERTENSION: ICD-10-CM

## 2025-06-09 DIAGNOSIS — N18.31 STAGE 3A CHRONIC KIDNEY DISEASE (H): ICD-10-CM

## 2025-06-09 PROCEDURE — 1126F AMNT PAIN NOTED NONE PRSNT: CPT | Performed by: FAMILY MEDICINE

## 2025-06-09 PROCEDURE — 3078F DIAST BP <80 MM HG: CPT | Performed by: FAMILY MEDICINE

## 2025-06-09 PROCEDURE — 99214 OFFICE O/P EST MOD 30 MIN: CPT | Performed by: FAMILY MEDICINE

## 2025-06-09 PROCEDURE — G0463 HOSPITAL OUTPT CLINIC VISIT: HCPCS

## 2025-06-09 PROCEDURE — 3074F SYST BP LT 130 MM HG: CPT | Performed by: FAMILY MEDICINE

## 2025-06-09 RX ORDER — FOLIC ACID 1 MG/1
1000 TABLET ORAL DAILY
Qty: 90 TABLET | Refills: 3 | Status: CANCELLED | OUTPATIENT
Start: 2025-06-09

## 2025-06-09 RX ORDER — OMEPRAZOLE 20 MG/1
20 CAPSULE, DELAYED RELEASE ORAL DAILY
Qty: 90 CAPSULE | Refills: 4 | Status: CANCELLED | OUTPATIENT
Start: 2025-06-09

## 2025-06-09 RX ORDER — LISINOPRIL AND HYDROCHLOROTHIAZIDE 20; 25 MG/1; MG/1
1 TABLET ORAL DAILY
Qty: 90 TABLET | Refills: 3 | Status: SHIPPED | OUTPATIENT
Start: 2025-06-09

## 2025-06-09 RX ORDER — MELOXICAM 7.5 MG/1
7.5 TABLET ORAL DAILY
Qty: 90 TABLET | Refills: 4 | Status: SHIPPED | OUTPATIENT
Start: 2025-06-09

## 2025-06-09 ASSESSMENT — PAIN SCALES - GENERAL: PAINLEVEL_OUTOF10: NO PAIN (0)

## 2025-06-09 NOTE — PROGRESS NOTES
Assessment & Plan     Gastroesophageal reflux disease without esophagitis  Controlled on omeprazole on a regular daily basis.    Essential hypertension  Controlled on medications.  Refills given.  He has had multiple creatinines with the recent blood work after the knee surgery and infection so not due for that now.  Go ahead and add BMP to his blood work in August.  Follow-up in 1 year for medication recheck, sooner if any problems.  - lisinopril-hydrochlorothiazide (ZESTORETIC) 20-25 MG tablet; Take 1 tablet by mouth daily.  - Basic metabolic panel; Future    Rheumatoid arthritis of multiple sites without rheumatoid factor (H)  Referral to rheumatology.  Refilled meloxicam.  Since he is off the methotrexate, no need for folic acid at this point.  - meloxicam (MOBIC) 7.5 MG tablet; Take 1 tablet (7.5 mg) by mouth daily.  - Adult Rheumatology  Referral; Future    Colon cancer screening  He is due for colonoscopy so that is ordered.  - Colonoscopy Screening  Referral; Future    Stage 3a chronic kidney disease (H)  Stable creatinines in the 1.2-1.4 range.          BMI  Estimated body mass index is 27.86 kg/m  as calculated from the following:    Height as of this encounter: 1.829 m (6').    Weight as of this encounter: 93.2 kg (205 lb 6.4 oz).             Celina Sands is a 64 year old, presenting for the following health issues:  Recheck Medication (Renewal ) and Establish Care        6/9/2025     9:59 AM   Additional Questions   Roomed by Tata de luna     History of Present Illness       Hypertension: He presents for follow up of hypertension.  He does not check blood pressure  regularly outside of the clinic. Outpatient blood pressures have not been over 140/90. He does not follow a low salt diet.     He eats 0-1 servings of fruits and vegetables daily.He consumes 1 sweetened beverage(s) daily.He exercises with enough effort to increase his heart rate 20 to 29 minutes per day.  He exercises  with enough effort to increase his heart rate 3 or less days per week.   He is taking medications regularly.    64-year-old male here for follow-up of medications and establish care.  He has been a previous patient of Dr. Gomes and needs new PCP.  He does have hypertension, which has been controlled on lisinopril/hydrochlorothiazide for a long time.  He has had some elevated creatinines in the past around 1.0 - 1.3, but generally over the last year ever since his knee surgeries he has been in the 1.3-1.4 area.  Discussed that this is technically stage IIIa chronic kidney disease.  That has been stable over the multiple creatinine tests that he has had over the last few months.  He has had 3 knee surgeries since last June with total knee arthroplasty June 2024 followed by quadriceps tendon rupture and repair along with total knee arthroplasty revision in February.  He just completed physical therapy and things have gone well with that.    He does have underlying rheumatoid arthritis with previous rheumatology evaluations in [place].  He has not seen them since about 2018.  He has been on leflunomide in the past and most recently methotrexate in combination with folic acid and meloxicam.  He has been off the methotrexate since the February knee surgery and feels like that really was not doing anything more for the arthritis pain than the meloxicam by itself.  He therefore has not felt like he wanted to restart that.  He does need a refill of the meloxicam however.  He also would like referral to local rheumatology.    He also takes omeprazole on a regular basis for GERD.  That has been well-controlled.  He also would like to get colonoscopy going since he is a bit late on that.  That was due in August 2024, but was put on hold with some of the knee issues.  He also did have a lesion on the right temple that he wanted looked at.  It does not really bother him, but his wife wanted it to be checked.  No other complaints  today.    I have reviewed the patient's medical history in detail and updated the computerized patient record.          The 10-year ASCVD risk score (Addis DK, et al., 2019) is: 10.5%    Values used to calculate the score:      Age: 64 years      Sex: Male      Is Non- : No      Diabetic: No      Tobacco smoker: No      Systolic Blood Pressure: 112 mmHg      Is BP treated: Yes      HDL Cholesterol: 51 mg/dL      Total Cholesterol: 152 mg/dL                  Objective    /74   Pulse 78   Temp 97.1  F (36.2  C) (Tympanic)   Resp 16   Ht 1.829 m (6')   Wt 93.2 kg (205 lb 6.4 oz)   SpO2 99%   BMI 27.86 kg/m    Body mass index is 27.86 kg/m .  Physical Exam   GENERAL: alert and no distress  RESP: lungs clear to auscultation - no rales, rhonchi or wheezes  CV: regular rate and rhythm, normal S1 S2, no S3 or S4, no murmur, click or rub, no peripheral edema   SKIN: no suspicious lesions or rashes; benign-appearing seborrheic keratosis right temple area with reassurance given.          Margin code:  Total face to face time alng with records review and documentation 32 minutes.  Signed Electronically by: Mathew Velásquez MD

## 2025-06-09 NOTE — NURSING NOTE
Patient is here to follow up on medications to have renewed for refills.  No concerns at this time.       Medication Reconciliation: complete    Tata Ortega LPN 6/9/2025 10:10 AM       Advance care directive on file? no  Advance care directive provided to patient? no       Tata Ortega LPN

## 2025-06-10 DIAGNOSIS — Z12.11 ENCOUNTER FOR SCREENING COLONOSCOPY: Primary | ICD-10-CM

## 2025-06-10 NOTE — TELEPHONE ENCOUNTER
Screening Questions for the Scheduling of Screening Colonoscopies   (If Colonoscopy is diagnostic, Provider should review the chart before scheduling.)  Are you younger than 45 or older than 80?  NO  Do you take aspirin or fish oil?  ASPIRIN (if yes, tell patient to stop 1 week prior to Colonoscopy)  Do you take warfarin (Coumadin), clopidogrel (Plavix), apixaban (Eliquis), dabigatram (Pradaxa), rivaroxaban (Xarelto) or any blood thinner? NO  Do you take semaglutide (Ozempic or Wegovy), tirzepatide (Mounjaro or Zepbound), liraglutide (Victoza), or dulaglutide (Trulicity)? NO  Do you use oxygen or a CPAP at home?  NO  Do you have kidney disease? NO  Are you on dialysis? NO  Have you had a stroke or heart attack in the last year? NO  Have you had a stent in your heart or any blood vessel in the last year? NO  Have you had a transplant of any organ? NO  Have you had a colonoscopy or upper endoscopy (EGD) before? YES         When?  2019  Date of scheduled Colonoscopy. 07/28/2025  Provider LANRE  Pharmacy [facility] - Patient is requesting Cherry flavor  Order Priority ROUTINE

## 2025-06-11 RX ORDER — POLYETHYLENE GLYCOL 3350, SODIUM CHLORIDE, SODIUM BICARBONATE, POTASSIUM CHLORIDE 420; 11.2; 5.72; 1.48 G/4L; G/4L; G/4L; G/4L
4000 POWDER, FOR SOLUTION ORAL ONCE
Qty: 4000 ML | Refills: 0 | Status: SHIPPED | OUTPATIENT
Start: 2025-07-21 | End: 2025-07-21

## 2025-06-11 RX ORDER — BISACODYL 5 MG/1
TABLET, DELAYED RELEASE ORAL
Qty: 2 TABLET | Refills: 0 | Status: SHIPPED | OUTPATIENT
Start: 2025-07-21

## 2025-06-18 RX ORDER — FOLIC ACID 1 MG/1
1 TABLET ORAL DAILY
Qty: 90 TABLET | Refills: 3 | Status: SHIPPED | OUTPATIENT
Start: 2025-06-18

## 2025-06-18 NOTE — TELEPHONE ENCOUNTER
[id number] sent Rx request for the following:      Requested Prescriptions   Pending Prescriptions Disp Refills    folic acid (FOLVITE) 1 MG tablet       Sig: Take 1 tablet (1 mg) by mouth daily.       Vitamin Supplements Protocol Failed - 6/18/2025  8:49 AM        Failed - Medication is active on med list and the sig matches. RN to manually verify dose and sig if red X/fail.     If the protocol passes (green check), you do not need to verify med dose and sig.    A prescription matches if they are the same clinical intention.    For Example: once daily and every morning are the same.    The protocol can not identify upper and lower case letters as matching and will fail.     For Example: Take 1 tablet (50 mg) by mouth daily     TAKE 1 TABLET (50 MG) BY MOUTH DAILY    For all fails (red x), verify dose and sig.    If the refill does match what is on file, the RN can still proceed to approve the refill request.       If they do not match, route to the appropriate provider.     Last Office Visit:              6/9/25   Future Office visit:           None    Unable to complete prescription refill per RN Medication Refill Policy. Tamia Lucas, Refill RN .............. 6/18/2025  8:50 AM

## 2025-07-23 PROBLEM — Z86.0101 H/O ADENOMATOUS POLYP OF COLON: Status: ACTIVE | Noted: 2025-07-23

## 2025-07-28 ENCOUNTER — ANESTHESIA (OUTPATIENT)
Dept: SURGERY | Facility: OTHER | Age: 65
End: 2025-07-28
Payer: MEDICARE

## 2025-07-28 ENCOUNTER — ANESTHESIA EVENT (OUTPATIENT)
Dept: SURGERY | Facility: OTHER | Age: 65
End: 2025-07-28
Payer: MEDICARE

## 2025-07-28 ENCOUNTER — HOSPITAL ENCOUNTER (OUTPATIENT)
Facility: OTHER | Age: 65
Discharge: HOME | End: 2025-07-28
Attending: SURGERY | Admitting: SURGERY
Payer: MEDICARE

## 2025-07-28 VITALS
TEMPERATURE: 97.6 F | BODY MASS INDEX: 27.77 KG/M2 | WEIGHT: 205 LBS | DIASTOLIC BLOOD PRESSURE: 79 MMHG | SYSTOLIC BLOOD PRESSURE: 104 MMHG | HEIGHT: 72 IN | RESPIRATION RATE: 16 BRPM | HEART RATE: 72 BPM | OXYGEN SATURATION: 97 %

## 2025-07-28 PROBLEM — K63.5 COLON POLYP: Status: ACTIVE | Noted: 2025-07-28

## 2025-07-28 PROBLEM — K57.30 SIGMOID DIVERTICULOSIS: Status: ACTIVE | Noted: 2025-07-28

## 2025-07-28 PROCEDURE — 258N000003 HC RX IP 258 OP 636: Performed by: SURGERY

## 2025-07-28 PROCEDURE — 45380 COLONOSCOPY AND BIOPSY: CPT | Performed by: NURSE ANESTHETIST, CERTIFIED REGISTERED

## 2025-07-28 PROCEDURE — 250N000011 HC RX IP 250 OP 636: Performed by: NURSE ANESTHETIST, CERTIFIED REGISTERED

## 2025-07-28 PROCEDURE — 45380 COLONOSCOPY AND BIOPSY: CPT | Mod: PT | Performed by: SURGERY

## 2025-07-28 PROCEDURE — 45380 COLONOSCOPY AND BIOPSY: CPT | Performed by: SURGERY

## 2025-07-28 PROCEDURE — 88305 TISSUE EXAM BY PATHOLOGIST: CPT

## 2025-07-28 PROCEDURE — 250N000009 HC RX 250: Performed by: NURSE ANESTHETIST, CERTIFIED REGISTERED

## 2025-07-28 RX ORDER — NALOXONE HYDROCHLORIDE 0.4 MG/ML
0.2 INJECTION, SOLUTION INTRAMUSCULAR; INTRAVENOUS; SUBCUTANEOUS
Status: DISCONTINUED | OUTPATIENT
Start: 2025-07-28 | End: 2025-07-28 | Stop reason: HOSPADM

## 2025-07-28 RX ORDER — SODIUM CHLORIDE, SODIUM LACTATE, POTASSIUM CHLORIDE, CALCIUM CHLORIDE 600; 310; 30; 20 MG/100ML; MG/100ML; MG/100ML; MG/100ML
INJECTION, SOLUTION INTRAVENOUS CONTINUOUS
Status: DISCONTINUED | OUTPATIENT
Start: 2025-07-28 | End: 2025-07-28 | Stop reason: HOSPADM

## 2025-07-28 RX ORDER — LIDOCAINE 40 MG/G
CREAM TOPICAL
Status: DISCONTINUED | OUTPATIENT
Start: 2025-07-28 | End: 2025-07-28 | Stop reason: HOSPADM

## 2025-07-28 RX ORDER — ONDANSETRON 2 MG/ML
4 INJECTION INTRAMUSCULAR; INTRAVENOUS
Status: DISCONTINUED | OUTPATIENT
Start: 2025-07-28 | End: 2025-07-28 | Stop reason: HOSPADM

## 2025-07-28 RX ORDER — NALOXONE HYDROCHLORIDE 0.4 MG/ML
0.4 INJECTION, SOLUTION INTRAMUSCULAR; INTRAVENOUS; SUBCUTANEOUS
Status: DISCONTINUED | OUTPATIENT
Start: 2025-07-28 | End: 2025-07-28 | Stop reason: HOSPADM

## 2025-07-28 RX ORDER — LIDOCAINE HYDROCHLORIDE 20 MG/ML
INJECTION, SOLUTION INFILTRATION; PERINEURAL PRN
Status: DISCONTINUED | OUTPATIENT
Start: 2025-07-28 | End: 2025-07-28

## 2025-07-28 RX ORDER — FLUMAZENIL 0.1 MG/ML
0.2 INJECTION, SOLUTION INTRAVENOUS
Status: DISCONTINUED | OUTPATIENT
Start: 2025-07-28 | End: 2025-07-28 | Stop reason: HOSPADM

## 2025-07-28 RX ORDER — PROPOFOL 10 MG/ML
INJECTION, EMULSION INTRAVENOUS CONTINUOUS PRN
Status: DISCONTINUED | OUTPATIENT
Start: 2025-07-28 | End: 2025-07-28

## 2025-07-28 RX ORDER — PROPOFOL 10 MG/ML
INJECTION, EMULSION INTRAVENOUS PRN
Status: DISCONTINUED | OUTPATIENT
Start: 2025-07-28 | End: 2025-07-28

## 2025-07-28 RX ADMIN — PROPOFOL 150 MCG/KG/MIN: 10 INJECTION, EMULSION INTRAVENOUS at 09:16

## 2025-07-28 RX ADMIN — LIDOCAINE HYDROCHLORIDE 40 MG: 20 INJECTION, SOLUTION INFILTRATION; PERINEURAL at 09:16

## 2025-07-28 RX ADMIN — PROPOFOL 100 MG: 10 INJECTION, EMULSION INTRAVENOUS at 09:16

## 2025-07-28 RX ADMIN — SODIUM CHLORIDE, SODIUM LACTATE, POTASSIUM CHLORIDE, AND CALCIUM CHLORIDE: .6; .31; .03; .02 INJECTION, SOLUTION INTRAVENOUS at 08:33

## 2025-07-28 ASSESSMENT — ACTIVITIES OF DAILY LIVING (ADL)
ADLS_ACUITY_SCORE: 41

## 2025-07-28 NOTE — ANESTHESIA POSTPROCEDURE EVALUATION
Patient: Wicho Hawk    Procedure: Procedure(s):  COLONOSCOPY, WITH POLYPECTOMY       Anesthesia Type:  MAC    Note:  Disposition: Outpatient   Postop Pain Control: Uneventful            Sign Out: Well controlled pain   PONV: No   Neuro/Psych: Uneventful            Sign Out: Acceptable/Baseline neuro status   Airway/Respiratory: Uneventful            Sign Out: Acceptable/Baseline resp. status   CV/Hemodynamics: Uneventful            Sign Out: Acceptable CV status; No obvious hypovolemia; No obvious fluid overload   Other NRE: NONE   DID A NON-ROUTINE EVENT OCCUR? No           Last vitals:  Vitals Value Taken Time   /79 07/28/25 10:18   Temp     Pulse 72 07/28/25 10:17   Resp     SpO2 97 % 07/28/25 10:17       Electronically Signed By: NIELS Casey CRNA  July 28, 2025  12:22 PM

## 2025-07-28 NOTE — OR NURSING
Patient and responsible adult given discharge instructions with no questions regarding instructions. Mukund score 20. Pain level 0/10.  Discharged from unit via walking . Patient discharged to home .

## 2025-07-28 NOTE — OP NOTE
PROCEDURE NOTE    DATE OF SERVICE: 7/28/2025    SURGEON: Syed Trent MD    PRE-OP DIAGNOSIS:    Screening  History of Polyps    POST-OP DIAGNOSIS:  Same  Sigmoid Diverticulosis  Polyp at mid TC    PROCEDURE:     Colonoscopy with cold biopsy      ANESTHESIA:  JACKSON Cardona CRNA    INDICATION FOR THE PROCEDURE: The patient is a 64 year old male with h/o polyps . The patient has no other complaints  . After explaining the risks to include bleeding, perforation, potential inability to reach the cecum, the patient wished to proceed.    PROCEDURE:After adequate sedation, the patient was in the left lateral decubitus position.  Rectal exam was performed.  There was normal tone and no palpable masses .  The colonoscope was introduced into the rectum and advanced to the cecum with Mild difficulty.  The patient's prep was good.  The terminal cecum was reached.  The cecum, ascending, transverse, descending and sigmoid colon was with sigmoid diverticulosis and with a small 0.3 cm polyp at mid TC that was removed with Jumbo forceps .  The scope was retroflexed in the rectum.  The rectum was unremarkable  .  The scope was straightened and removed.  The patient tolerated the procedure well.     ESTIMATED BLOOD LOSS: none    COMPLICATIONS:  None    TISSUE REMOVED:  Yes    RECOMMEND:      Follow-up pending pathology  Fiber  Given literature on diverticulosis    Syed Trent MD FACS

## 2025-07-28 NOTE — H&P
History and Physical    CHIEF COMPLAINT / REASON FOR PROCEDURE:  h/o polyps    PERTINENT HISTORY   Patient is a 64 year old male who presents today for screening colonoscopy for h/o polyps.   Last colonoscopy 2019.  Patient has no complaints.    Past Medical History:   Diagnosis Date    Equivocal stress test     Cardiolyte    Essential (primary) hypertension     No Comments Provided    Gastro-esophageal reflux disease without esophagitis     No Comments Provided    Hyperlipidemia     No Comments Provided    Male erectile dysfunction     7/2/2014    Rheumatoid arthritis (H)     No Comments Provided    Stage 3a chronic kidney disease (H) 06/09/2025    Tubular adenoma      Past Surgical History:   Procedure Laterality Date    ARTHROSCOPY KNEE Left     No Comments Provided    ARTHROSCOPY SHOULDER Left     08/2011    ARTHROTOMY WRIST Left     Wrist surgery with fusion.    COLONOSCOPY  05/01/2009    Normal screening colonoscopy, follow up 10 years    COLONOSCOPY N/A 07/26/2019    tubular adenoma, 5 year follow up    ELBOW SURGERY Right     Right elbow surgery for bone spurs/chips    ESOPHAGOSCOPY, GASTROSCOPY, DUODENOSCOPY (EGD), COMBINED      12/2015,[place]    VASECTOMY      No Comments Provided       Bleeding tendencies:  No    ALLERGIES/SENSITIVITIES: No Known Allergies     CURRENT MEDICATIONS:    Prior to Admission medications   Medication Sig Start Date End Date Taking? Authorizing Provider   aspirin (ASA) 81 MG tablet Take 1 tablet (81 mg) by mouth daily 6/4/19  Yes Leonardo Gomes MD   bisacodyl (DULCOLAX) 5 MG EC tablet Take as directed by colonoscopy prep instructions 7/21/25  Yes Syed Trent MD   lisinopril-hydrochlorothiazide (ZESTORETIC) 20-25 MG tablet Take 1 tablet by mouth daily. 6/9/25  Yes Mathew Velásquez MD   meloxicam (MOBIC) 7.5 MG tablet Take 1 tablet (7.5 mg) by mouth daily. 6/9/25  Yes Mathew Velásquez MD   omeprazole (PRILOSEC) 20 MG DR capsule Take 1-2 capsules (20-40 mg) by mouth daily as needed  (GERD). 3/25/25  Yes Kerri Montes PA-C   tamsulosin (FLOMAX) 0.4 MG capsule TAKE 1 CAPSULE BY MOUTH ONCE DAILY AFTER SUPPER PRIOR  TO  BEDTIME.  WATCH  FOR  DIZZINESS  UPON  STANDING 1/10/25  Yes Alexander Wong MD   folic acid (FOLVITE) 1 MG tablet Take 1 tablet (1 mg) by mouth daily. 6/18/25   Mathew Velásquez MD       Physical Exam:  /77   Pulse 65   Temp 97.6  F (36.4  C) (Tympanic)   Resp 16   Ht 1.829 m (6')   Wt 93 kg (205 lb)   SpO2 98%   BMI 27.80 kg/m    EXAM:  Chest/Respiratory Exam: Normal - Clear to auscultation without rales, rhonchi, or wheezing.  Cardiovascular Exam: regular rate and rhythm        PLAN: COLONOSCOPY .  Patient understands risks of bleeding, perforation, potential inability to reach cecum, aspiration and wishes to proceed.

## 2025-07-28 NOTE — ANESTHESIA CARE TRANSFER NOTE
Patient: Wicho Hawk    Procedure: Procedure(s):  COLONOSCOPY, WITH POLYPECTOMY       Diagnosis: Colon cancer screening [Z12.11]  Diagnosis Additional Information: No value filed.    Anesthesia Type:   MAC     Note:    Oropharynx: oropharynx clear of all foreign objects  Level of Consciousness: awake  Oxygen Supplementation: room air    Independent Airway: airway patency satisfactory and stable  Dentition: dentition unchanged  Vital Signs Stable: post-procedure vital signs reviewed and stable  Report to RN Given: handoff report given  Patient transferred to: Phase II    Handoff Report: Identifed the Patient, Identified the Reponsible Provider, Reviewed the pertinent medical history, Discussed the surgical course, Reviewed Intra-OP anesthesia mangement and issues during anesthesia, Set expectations for post-procedure period and Allowed opportunity for questions and acknowledgement of understanding      Vitals:  Vitals Value Taken Time   BP     Temp     Pulse     Resp     SpO2         Electronically Signed By: NIELS RENNER CRNA  July 28, 2025  9:46 AM

## 2025-07-28 NOTE — ANESTHESIA PREPROCEDURE EVALUATION
Anesthesia Pre-Procedure Evaluation    Patient: Wicho Hawk   MRN: [id number] : 1960          Procedure : Procedure(s):  Colonoscopy         Past Medical History:   Diagnosis Date    Equivocal stress test     Cardiolyte    Essential (primary) hypertension     No Comments Provided    Gastro-esophageal reflux disease without esophagitis     No Comments Provided    Hyperlipidemia     No Comments Provided    Male erectile dysfunction     2014    Rheumatoid arthritis (H)     No Comments Provided    Stage 3a chronic kidney disease (H) 2025    Tubular adenoma       Past Surgical History:   Procedure Laterality Date    ARTHROSCOPY KNEE Left     No Comments Provided    ARTHROSCOPY SHOULDER Left     2011    ARTHROTOMY WRIST Left     Wrist surgery with fusion.    COLONOSCOPY  2009    Normal screening colonoscopy, follow up 10 years    COLONOSCOPY N/A 2019    tubular adenoma, 5 year follow up    ELBOW SURGERY Right     Right elbow surgery for bone spurs/chips    ESOPHAGOSCOPY, GASTROSCOPY, DUODENOSCOPY (EGD), COMBINED      2015,[place]    VASECTOMY      No Comments Provided      No Known Allergies   Social History     Tobacco Use    Smoking status: Never     Passive exposure: Never    Smokeless tobacco: Never   Substance Use Topics    Alcohol use: Yes     Alcohol/week: 5.0 standard drinks of alcohol     Comment: 1-2 times a week      Wt Readings from Last 1 Encounters:   25 93 kg (205 lb)        Anesthesia Evaluation   Pt has had prior anesthetic.     No history of anesthetic complications       ROS/MED HX  ENT/Pulmonary:       Neurologic:       Cardiovascular:     (+) Dyslipidemia hypertension- -   -  - -                                      METS/Exercise Tolerance: >4 METS    Hematologic:       Musculoskeletal:   (+)  arthritis,             GI/Hepatic:     (+) GERD, Asymptomatic on medication,                  Renal/Genitourinary:     (+) renal disease,             Endo:      "  Psychiatric/Substance Use:       Infectious Disease:       Malignancy:       Other:              Physical Exam  Airway  Mallampati: II  TM distance: >3 FB  Neck ROM: full  Mouth opening: >= 4 cm    Cardiovascular - normal exam  Rhythm: regular  Rate: normal rate     Dental   (+) Minor Abnormalities - some fillings, tiny chips      Pulmonary - normal examBreath sounds clear to auscultation        Neurological - normal exam  He appears awake, alert and oriented x3.    Other Findings       OUTSIDE LABS:  CBC:   Lab Results   Component Value Date    WBC 6.7 05/28/2025    WBC 6.7 05/14/2025    HGB 12.6 (L) 05/28/2025    HGB 13.1 (L) 05/14/2025    HCT 36.9 (L) 05/28/2025    HCT 38.3 (L) 05/14/2025     05/28/2025     05/14/2025     BMP:   Lab Results   Component Value Date     12/17/2024     (L) 07/23/2024    POTASSIUM 4.1 12/17/2024    POTASSIUM 3.9 07/23/2024    CHLORIDE 98 12/17/2024    CHLORIDE 95 (L) 07/23/2024    CO2 31 (H) 12/17/2024    CO2 27 07/23/2024    BUN 15.3 02/04/2025    BUN 17.5 01/30/2025    CR 1.45 (H) 05/28/2025    CR 1.43 (H) 05/14/2025     (H) 12/17/2024     (H) 07/23/2024     COAGS: No results found for: \"PTT\", \"INR\", \"FIBR\"  POC: No results found for: \"BGM\", \"HCG\", \"HCGS\"  HEPATIC:   Lab Results   Component Value Date    ALBUMIN 4.4 11/03/2023    PROTTOTAL 7.2 11/03/2023    ALT 17 02/04/2025    AST 23 02/04/2025    ALKPHOS 84 02/04/2025    BILITOTAL 0.6 02/04/2025     OTHER:   Lab Results   Component Value Date    A1C 5.4 09/27/2021    ARIEL 9.1 12/17/2024    SED Canceled, Test credited 09/05/2019       Anesthesia Plan    ASA Status:  2      NPO Status: NPO Appropriate   Anesthesia Type: MAC.  Airway: natural airway.  Induction: intravenous.  Maintenance: Balanced.   Techniques and Equipment:       - Monitoring Plan: standard ASA monitoring     Consents    Anesthesia Plan(s) and associated risks, benefits, and realistic alternatives discussed. Questions " answered and patient/representative(s) expressed understanding.     - Discussed:     - Discussed with:  Patient, family        - Pt is DNR/DNI Status: no DNR          Postoperative Care         Comments:    Other Comments: Risks, benefits and alternatives discussed and would like to proceed. General anesthesia ok if indicated.                    NIELS Luna CRNA    I have reviewed the pertinent notes and labs in the chart from the past 30 days and (re)examined the patient.  Any updates or changes from those notes are reflected in this note.    Clinically Significant Risk Factors Present on Admission                 # Drug Induced Platelet Defect: home medication list includes an antiplatelet medication   # Hypertension: Noted on problem list           # Overweight: Estimated body mass index is 27.8 kg/m  as calculated from the following:    Height as of this encounter: 1.829 m (6').    Weight as of this encounter: 93 kg (205 lb).

## 2025-07-28 NOTE — DISCHARGE INSTRUCTIONS
[facility]  Adult Discharge Orders & Instructions      For 24 hours after surgery:  Get plenty of rest.  A responsible adult must stay with you for at least 24 hours after you leave the hospital.   You may feel lightheaded.  IF so, sit for a few minutes before standing.  Have someone help you get up.   You may have a slight fever. Call the doctor if your fever is over 101 F (38.3 C) (taken under the tongue) or lasts longer than 24 hours.  You may have a dry mouth, a sore throat, muscle aches or trouble sleeping.  These should go away after 24 hours.  Do not make important or legal decisions.  6.   Do not drive or use heavy equipment.  If you have weakness or tingling, don't drive or use heavy equipment until this feeling goes away.                                                                                                                                                                         To contact a doctor, call    [phone]______________     INSTRUCTIONS AFTER COLONOSCOPY    WHEN YOU ARE BACK HOME:  Plan to rest for an hour or two after you get home.  You may have some cramping or pressure until you pass gas.  You may resume your regular medications.  Eat a small, light meal at first, and then gradually return to normal meal sizes.  You will be contacted the next day to see how you are doing.  If you had a polyp removed: 1 polyp   Slight bleeding may occur.  You may have a slight blood stain on the toilet paper after a bowel movement.  To lessen the chance of bleeding, avoid heavy exercise for ONE WEEK.  This includes heavy lifting, vigorous sport activities, and heavy physical labor.      If there is a polyp or biopsy, you will be contacted with results within 7-10 days.     WHAT TO WATCH FOR:  Problems rarely occur after the exam; however, it is important for you to watch for early signs of possible problems.  If you have   Unusual pain in your abdomen  Nausea and vomiting that  persists  Excessive bleeding  Black or bloody bowel movements  Fever or temperature above 100.6 F  Please call your doctor or go to the nearest hospital emergency room.

## 2025-07-30 LAB
PATH REPORT.COMMENTS IMP SPEC: NORMAL
PATH REPORT.FINAL DX SPEC: NORMAL
PATH REPORT.RELEVANT HX SPEC: NORMAL
PHOTO IMAGE: NORMAL

## 2025-07-31 PROBLEM — K63.5 COLON POLYP: Status: RESOLVED | Noted: 2025-07-28 | Resolved: 2025-07-31

## 2025-08-05 DIAGNOSIS — R97.20 ELEVATED PROSTATE SPECIFIC ANTIGEN (PSA): Primary | ICD-10-CM

## 2025-08-18 ENCOUNTER — LAB (OUTPATIENT)
Dept: LAB | Facility: OTHER | Age: 65
End: 2025-08-18
Attending: UROLOGY
Payer: MEDICARE

## 2025-08-18 DIAGNOSIS — D07.5 PIN III (PROSTATIC INTRAEPITHELIAL NEOPLASIA III): ICD-10-CM

## 2025-08-18 DIAGNOSIS — I10 ESSENTIAL HYPERTENSION: ICD-10-CM

## 2025-08-18 DIAGNOSIS — R97.20 ELEVATED PROSTATE SPECIFIC ANTIGEN (PSA): ICD-10-CM

## 2025-08-18 LAB
ALBUMIN UR-MCNC: NEGATIVE MG/DL
ANION GAP SERPL CALCULATED.3IONS-SCNC: 11 MMOL/L (ref 7–15)
APPEARANCE UR: CLEAR
BASOPHILS # BLD AUTO: 0.04 10E3/UL (ref 0–0.2)
BASOPHILS NFR BLD AUTO: 0.6 %
BILIRUB UR QL STRIP: NEGATIVE
BUN SERPL-MCNC: 19.1 MG/DL (ref 8–23)
CALCIUM SERPL-MCNC: 9.6 MG/DL (ref 8.8–10.4)
CHLORIDE SERPL-SCNC: 98 MMOL/L (ref 98–107)
COLOR UR AUTO: NORMAL
CREAT SERPL-MCNC: 1.48 MG/DL (ref 0.67–1.17)
CREAT SERPL-MCNC: 1.48 MG/DL (ref 0.67–1.17)
CRP SERPL-MCNC: <3 MG/L
EGFRCR SERPLBLD CKD-EPI 2021: 52 ML/MIN/1.73M2
EGFRCR SERPLBLD CKD-EPI 2021: 52 ML/MIN/1.73M2
EOSINOPHIL # BLD AUTO: 0.12 10E3/UL (ref 0–0.7)
EOSINOPHIL NFR BLD AUTO: 1.8 %
ERYTHROCYTE [DISTWIDTH] IN BLOOD BY AUTOMATED COUNT: 14.7 % (ref 10–15)
GLUCOSE SERPL-MCNC: 111 MG/DL (ref 70–99)
GLUCOSE UR STRIP-MCNC: NEGATIVE MG/DL
HCO3 SERPL-SCNC: 25 MMOL/L (ref 22–29)
HCT VFR BLD AUTO: 40.2 % (ref 40–53)
HGB BLD-MCNC: 13.8 G/DL (ref 13.3–17.7)
HGB UR QL STRIP: NEGATIVE
IMM GRANULOCYTES # BLD: 0.06 10E3/UL
IMM GRANULOCYTES NFR BLD: 0.9 %
KETONES UR STRIP-MCNC: NEGATIVE MG/DL
LEUKOCYTE ESTERASE UR QL STRIP: NEGATIVE
LYMPHOCYTES # BLD AUTO: 1.32 10E3/UL (ref 0.8–5.3)
LYMPHOCYTES NFR BLD AUTO: 19.8 %
MCH RBC QN AUTO: 31.7 PG (ref 26.5–33)
MCHC RBC AUTO-ENTMCNC: 34.3 G/DL (ref 31.5–36.5)
MCV RBC AUTO: 92.2 FL (ref 78–100)
MONOCYTES # BLD AUTO: 0.53 10E3/UL (ref 0–1.3)
MONOCYTES NFR BLD AUTO: 8 %
NEUTROPHILS # BLD AUTO: 4.58 10E3/UL (ref 1.6–8.3)
NEUTROPHILS NFR BLD AUTO: 68.9 %
NITRATE UR QL: NEGATIVE
NRBC # BLD AUTO: <0.03 10E3/UL
NRBC BLD AUTO-RTO: 0 /100
PH UR STRIP: 6.5 [PH] (ref 5–9)
PLATELET # BLD AUTO: 245 10E3/UL (ref 150–450)
POTASSIUM SERPL-SCNC: 4.8 MMOL/L (ref 3.4–5.3)
PSA SERPL DL<=0.01 NG/ML-MCNC: 12.67 NG/ML (ref 0–4.5)
RBC # BLD AUTO: 4.36 10E6/UL (ref 4.4–5.9)
SODIUM SERPL-SCNC: 134 MMOL/L (ref 135–145)
SP GR UR STRIP: 1.02 (ref 1–1.03)
UROBILINOGEN UR STRIP-MCNC: NORMAL MG/DL
WBC # BLD AUTO: 6.65 10E3/UL (ref 4–11)

## 2025-08-18 PROCEDURE — 36415 COLL VENOUS BLD VENIPUNCTURE: CPT | Mod: ZL

## 2025-08-18 PROCEDURE — 86140 C-REACTIVE PROTEIN: CPT | Mod: ZL

## 2025-08-18 PROCEDURE — 81003 URINALYSIS AUTO W/O SCOPE: CPT | Mod: ZL

## 2025-08-18 PROCEDURE — 80048 BASIC METABOLIC PNL TOTAL CA: CPT | Mod: ZL

## 2025-08-18 PROCEDURE — 84153 ASSAY OF PSA TOTAL: CPT | Mod: ZL

## 2025-08-18 PROCEDURE — 85025 COMPLETE CBC W/AUTO DIFF WBC: CPT | Mod: ZL

## 2025-08-19 ENCOUNTER — PATIENT OUTREACH (OUTPATIENT)
Dept: CARE COORDINATION | Facility: CLINIC | Age: 65
End: 2025-08-19
Payer: COMMERCIAL

## 2025-08-20 ENCOUNTER — VIRTUAL VISIT (OUTPATIENT)
Dept: UROLOGY | Facility: OTHER | Age: 65
End: 2025-08-20
Attending: UROLOGY
Payer: MEDICARE

## 2025-08-20 VITALS
BODY MASS INDEX: 28.13 KG/M2 | SYSTOLIC BLOOD PRESSURE: 110 MMHG | OXYGEN SATURATION: 95 % | RESPIRATION RATE: 16 BRPM | DIASTOLIC BLOOD PRESSURE: 60 MMHG | HEART RATE: 85 BPM | WEIGHT: 207.4 LBS

## 2025-08-20 DIAGNOSIS — R97.20 ELEVATED PROSTATE SPECIFIC ANTIGEN (PSA): Primary | ICD-10-CM

## 2025-08-20 DIAGNOSIS — N40.1 BPH WITH OBSTRUCTION/LOWER URINARY TRACT SYMPTOMS: ICD-10-CM

## 2025-08-20 DIAGNOSIS — N13.8 BPH WITH OBSTRUCTION/LOWER URINARY TRACT SYMPTOMS: ICD-10-CM

## 2025-08-20 PROCEDURE — 51798 US URINE CAPACITY MEASURE: CPT | Performed by: UROLOGY

## 2025-08-20 PROCEDURE — G0463 HOSPITAL OUTPT CLINIC VISIT: HCPCS | Mod: 25

## 2025-08-20 ASSESSMENT — PAIN SCALES - GENERAL: PAINLEVEL_OUTOF10: NO PAIN (0)

## 2025-08-25 ENCOUNTER — HOSPITAL ENCOUNTER (OUTPATIENT)
Dept: MRI IMAGING | Facility: OTHER | Age: 65
Discharge: HOME | End: 2025-08-25
Attending: UROLOGY | Admitting: UROLOGY
Payer: MEDICARE

## 2025-08-25 DIAGNOSIS — R97.20 ELEVATED PROSTATE SPECIFIC ANTIGEN (PSA): ICD-10-CM

## 2025-08-25 PROCEDURE — 76377 3D RENDER W/INTRP POSTPROCES: CPT | Mod: 26 | Performed by: STUDENT IN AN ORGANIZED HEALTH CARE EDUCATION/TRAINING PROGRAM

## 2025-08-25 PROCEDURE — 255N000002 HC RX 255 OP 636: Performed by: UROLOGY

## 2025-08-25 PROCEDURE — A9575 INJ GADOTERATE MEGLUMI 0.1ML: HCPCS | Performed by: UROLOGY

## 2025-08-25 PROCEDURE — 76377 3D RENDER W/INTRP POSTPROCES: CPT

## 2025-08-25 PROCEDURE — 72197 MRI PELVIS W/O & W/DYE: CPT | Mod: 26 | Performed by: STUDENT IN AN ORGANIZED HEALTH CARE EDUCATION/TRAINING PROGRAM

## 2025-08-25 RX ADMIN — GADOTERATE MEGLUMINE 20 ML: 376.9 INJECTION INTRAVENOUS at 14:58

## 2025-08-26 DIAGNOSIS — R97.20 ELEVATED PROSTATE SPECIFIC ANTIGEN (PSA): Primary | ICD-10-CM

## (undated) DEVICE — TUBING SUCTION 10'X3/16" N510

## (undated) DEVICE — SOL WATER 1500ML

## (undated) DEVICE — SUCTION MANIFOLD NEPTUNE 2 SYS 4 PORT 0702-020-000

## (undated) DEVICE — ENDO BRUSH CHANNEL MASTER CLEANING 2-4.2MM BW-412T

## (undated) DEVICE — ENDO KIT COMPLIANCE DYKENDOCMPLY

## (undated) DEVICE — Device

## (undated) RX ORDER — PROPOFOL 10 MG/ML
INJECTION, EMULSION INTRAVENOUS
Status: DISPENSED
Start: 2019-07-26

## (undated) RX ORDER — PROPOFOL 10 MG/ML
INJECTION, EMULSION INTRAVENOUS
Status: DISPENSED
Start: 2025-07-28

## (undated) RX ORDER — LIDOCAINE HYDROCHLORIDE 20 MG/ML
INJECTION, SOLUTION EPIDURAL; INFILTRATION; INTRACAUDAL; PERINEURAL
Status: DISPENSED
Start: 2019-07-26